# Patient Record
Sex: MALE | Race: WHITE | NOT HISPANIC OR LATINO | Employment: OTHER | URBAN - METROPOLITAN AREA
[De-identification: names, ages, dates, MRNs, and addresses within clinical notes are randomized per-mention and may not be internally consistent; named-entity substitution may affect disease eponyms.]

---

## 2024-03-07 ENCOUNTER — APPOINTMENT (OUTPATIENT)
Dept: CARDIOLOGY | Facility: MEDICAL CENTER | Age: 64
DRG: 232 | End: 2024-03-07
Attending: INTERNAL MEDICINE
Payer: COMMERCIAL

## 2024-03-07 ENCOUNTER — HOSPITAL ENCOUNTER (INPATIENT)
Facility: MEDICAL CENTER | Age: 64
LOS: 9 days | DRG: 232 | End: 2024-03-16
Attending: EMERGENCY MEDICINE | Admitting: INTERNAL MEDICINE
Payer: COMMERCIAL

## 2024-03-07 ENCOUNTER — APPOINTMENT (OUTPATIENT)
Dept: RADIOLOGY | Facility: MEDICAL CENTER | Age: 64
DRG: 232 | End: 2024-03-07
Attending: EMERGENCY MEDICINE
Payer: COMMERCIAL

## 2024-03-07 DIAGNOSIS — R79.89 ELEVATED TROPONIN: ICD-10-CM

## 2024-03-07 DIAGNOSIS — Z95.1 S/P CABG X 4: ICD-10-CM

## 2024-03-07 DIAGNOSIS — Z95.1 S/P CABG X 3: ICD-10-CM

## 2024-03-07 DIAGNOSIS — R07.9 ACUTE CHEST PAIN: ICD-10-CM

## 2024-03-07 PROBLEM — I24.9 ACS (ACUTE CORONARY SYNDROME) (HCC): Status: ACTIVE | Noted: 2024-03-07

## 2024-03-07 PROBLEM — I10 PRIMARY HYPERTENSION: Chronic | Status: ACTIVE | Noted: 2024-03-07

## 2024-03-07 PROBLEM — Z90.81 H/O SPLENECTOMY: Chronic | Status: ACTIVE | Noted: 2024-03-07

## 2024-03-07 PROBLEM — E78.5 HYPERLIPIDEMIA: Chronic | Status: ACTIVE | Noted: 2024-03-07

## 2024-03-07 PROBLEM — E03.9 HYPOTHYROID: Chronic | Status: ACTIVE | Noted: 2024-03-07

## 2024-03-07 PROBLEM — E11.9 TYPE 2 DIABETES MELLITUS, WITHOUT LONG-TERM CURRENT USE OF INSULIN (HCC): Status: ACTIVE | Noted: 2024-03-07

## 2024-03-07 PROBLEM — Z85.71 HISTORY OF HODGKIN'S LYMPHOMA: Chronic | Status: ACTIVE | Noted: 2024-03-07

## 2024-03-07 LAB
ALBUMIN SERPL BCP-MCNC: 4.2 G/DL (ref 3.2–4.9)
ALBUMIN/GLOB SERPL: 1.6 G/DL
ALP SERPL-CCNC: 80 U/L (ref 30–99)
ALT SERPL-CCNC: 34 U/L (ref 2–50)
ANION GAP SERPL CALC-SCNC: 17 MMOL/L (ref 7–16)
APTT PPP: 65.6 SEC (ref 24.7–36)
AST SERPL-CCNC: 40 U/L (ref 12–45)
BASOPHILS # BLD AUTO: 1 % (ref 0–1.8)
BASOPHILS # BLD: 0.1 K/UL (ref 0–0.12)
BILIRUB SERPL-MCNC: 0.4 MG/DL (ref 0.1–1.5)
BUN SERPL-MCNC: 9 MG/DL (ref 8–22)
CALCIUM ALBUM COR SERPL-MCNC: 8.6 MG/DL (ref 8.5–10.5)
CALCIUM SERPL-MCNC: 8.8 MG/DL (ref 8.5–10.5)
CHLORIDE SERPL-SCNC: 101 MMOL/L (ref 96–112)
CO2 SERPL-SCNC: 18 MMOL/L (ref 20–33)
CREAT SERPL-MCNC: 0.69 MG/DL (ref 0.5–1.4)
D DIMER PPP IA.FEU-MCNC: <0.27 UG/ML (FEU) (ref 0–0.5)
EKG IMPRESSION: NORMAL
EOSINOPHIL # BLD AUTO: 0.05 K/UL (ref 0–0.51)
EOSINOPHIL NFR BLD: 0.5 % (ref 0–6.9)
ERYTHROCYTE [DISTWIDTH] IN BLOOD BY AUTOMATED COUNT: 50.5 FL (ref 35.9–50)
EST. AVERAGE GLUCOSE BLD GHB EST-MCNC: 128 MG/DL
GFR SERPLBLD CREATININE-BSD FMLA CKD-EPI: 104 ML/MIN/1.73 M 2
GLOBULIN SER CALC-MCNC: 2.7 G/DL (ref 1.9–3.5)
GLUCOSE BLD STRIP.AUTO-MCNC: 101 MG/DL (ref 65–99)
GLUCOSE BLD STRIP.AUTO-MCNC: 145 MG/DL (ref 65–99)
GLUCOSE SERPL-MCNC: 118 MG/DL (ref 65–99)
HBA1C MFR BLD: 6.1 % (ref 4–5.6)
HCT VFR BLD AUTO: 46.5 % (ref 42–52)
HGB BLD-MCNC: 15.8 G/DL (ref 14–18)
IMM GRANULOCYTES # BLD AUTO: 0.03 K/UL (ref 0–0.11)
IMM GRANULOCYTES NFR BLD AUTO: 0.3 % (ref 0–0.9)
INR PPP: 0.97 (ref 0.87–1.13)
LV EJECT FRACT  99904: 70
LV EJECT FRACT MOD 2C 99903: 75.15
LV EJECT FRACT MOD 4C 99902: 69.46
LV EJECT FRACT MOD BP 99901: 73.04
LYMPHOCYTES # BLD AUTO: 3.09 K/UL (ref 1–4.8)
LYMPHOCYTES NFR BLD: 29.9 % (ref 22–41)
MCH RBC QN AUTO: 34.5 PG (ref 27–33)
MCHC RBC AUTO-ENTMCNC: 34 G/DL (ref 32.3–36.5)
MCV RBC AUTO: 101.5 FL (ref 81.4–97.8)
MONOCYTES # BLD AUTO: 0.49 K/UL (ref 0–0.85)
MONOCYTES NFR BLD AUTO: 4.7 % (ref 0–13.4)
NEUTROPHILS # BLD AUTO: 6.56 K/UL (ref 1.82–7.42)
NEUTROPHILS NFR BLD: 63.6 % (ref 44–72)
NRBC # BLD AUTO: 0 K/UL
NRBC BLD-RTO: 0 /100 WBC (ref 0–0.2)
NT-PROBNP SERPL IA-MCNC: 185 PG/ML (ref 0–125)
PLATELET # BLD AUTO: 311 K/UL (ref 164–446)
PMV BLD AUTO: 10.2 FL (ref 9–12.9)
POTASSIUM SERPL-SCNC: 4.1 MMOL/L (ref 3.6–5.5)
PROT SERPL-MCNC: 6.9 G/DL (ref 6–8.2)
PROTHROMBIN TIME: 13 SEC (ref 12–14.6)
RBC # BLD AUTO: 4.58 M/UL (ref 4.7–6.1)
SODIUM SERPL-SCNC: 136 MMOL/L (ref 135–145)
TROPONIN T SERPL-MCNC: 329 NG/L (ref 6–19)
TROPONIN T SERPL-MCNC: 577 NG/L (ref 6–19)
TROPONIN T SERPL-MCNC: 86 NG/L (ref 6–19)
TSH SERPL DL<=0.005 MIU/L-ACNC: 0.29 UIU/ML (ref 0.38–5.33)
UFH PPP CHRO-ACNC: 0.16 IU/ML
UFH PPP CHRO-ACNC: 0.33 IU/ML
WBC # BLD AUTO: 10.3 K/UL (ref 4.8–10.8)

## 2024-03-07 PROCEDURE — 99223 1ST HOSP IP/OBS HIGH 75: CPT | Performed by: INTERNAL MEDICINE

## 2024-03-07 PROCEDURE — 85610 PROTHROMBIN TIME: CPT

## 2024-03-07 PROCEDURE — 36415 COLL VENOUS BLD VENIPUNCTURE: CPT

## 2024-03-07 PROCEDURE — 93306 TTE W/DOPPLER COMPLETE: CPT | Mod: 26 | Performed by: INTERNAL MEDICINE

## 2024-03-07 PROCEDURE — 84484 ASSAY OF TROPONIN QUANT: CPT

## 2024-03-07 PROCEDURE — 85025 COMPLETE CBC W/AUTO DIFF WBC: CPT

## 2024-03-07 PROCEDURE — 82962 GLUCOSE BLOOD TEST: CPT

## 2024-03-07 PROCEDURE — 85520 HEPARIN ASSAY: CPT

## 2024-03-07 PROCEDURE — A9270 NON-COVERED ITEM OR SERVICE: HCPCS | Performed by: INTERNAL MEDICINE

## 2024-03-07 PROCEDURE — 80053 COMPREHEN METABOLIC PANEL: CPT

## 2024-03-07 PROCEDURE — 99285 EMERGENCY DEPT VISIT HI MDM: CPT

## 2024-03-07 PROCEDURE — 700117 HCHG RX CONTRAST REV CODE 255: Performed by: INTERNAL MEDICINE

## 2024-03-07 PROCEDURE — 85379 FIBRIN DEGRADATION QUANT: CPT

## 2024-03-07 PROCEDURE — 770020 HCHG ROOM/CARE - TELE (206)

## 2024-03-07 PROCEDURE — 96365 THER/PROPH/DIAG IV INF INIT: CPT

## 2024-03-07 PROCEDURE — 83036 HEMOGLOBIN GLYCOSYLATED A1C: CPT

## 2024-03-07 PROCEDURE — 83880 ASSAY OF NATRIURETIC PEPTIDE: CPT

## 2024-03-07 PROCEDURE — 93306 TTE W/DOPPLER COMPLETE: CPT

## 2024-03-07 PROCEDURE — 96366 THER/PROPH/DIAG IV INF ADDON: CPT

## 2024-03-07 PROCEDURE — 93005 ELECTROCARDIOGRAM TRACING: CPT | Performed by: INTERNAL MEDICINE

## 2024-03-07 PROCEDURE — 85730 THROMBOPLASTIN TIME PARTIAL: CPT

## 2024-03-07 PROCEDURE — 93005 ELECTROCARDIOGRAM TRACING: CPT | Performed by: EMERGENCY MEDICINE

## 2024-03-07 PROCEDURE — 700102 HCHG RX REV CODE 250 W/ 637 OVERRIDE(OP): Performed by: INTERNAL MEDICINE

## 2024-03-07 PROCEDURE — 700111 HCHG RX REV CODE 636 W/ 250 OVERRIDE (IP): Performed by: EMERGENCY MEDICINE

## 2024-03-07 PROCEDURE — 71045 X-RAY EXAM CHEST 1 VIEW: CPT

## 2024-03-07 PROCEDURE — 84443 ASSAY THYROID STIM HORMONE: CPT

## 2024-03-07 RX ORDER — MULTIVITAMIN WITH IRON
250 TABLET ORAL EVERY MORNING
COMMUNITY

## 2024-03-07 RX ORDER — LEVOTHYROXINE SODIUM 0.12 MG/1
125 TABLET ORAL EVERY MORNING
COMMUNITY

## 2024-03-07 RX ORDER — PROMETHAZINE HYDROCHLORIDE 25 MG/1
12.5-25 SUPPOSITORY RECTAL EVERY 4 HOURS PRN
Status: DISCONTINUED | OUTPATIENT
Start: 2024-03-07 | End: 2024-03-11

## 2024-03-07 RX ORDER — AMOXICILLIN 250 MG
2 CAPSULE ORAL 2 TIMES DAILY
Status: DISCONTINUED | OUTPATIENT
Start: 2024-03-07 | End: 2024-03-11

## 2024-03-07 RX ORDER — ONDANSETRON 2 MG/ML
4 INJECTION INTRAMUSCULAR; INTRAVENOUS EVERY 4 HOURS PRN
Status: DISCONTINUED | OUTPATIENT
Start: 2024-03-07 | End: 2024-03-11

## 2024-03-07 RX ORDER — PROMETHAZINE HYDROCHLORIDE 25 MG/1
12.5-25 TABLET ORAL EVERY 4 HOURS PRN
Status: DISCONTINUED | OUTPATIENT
Start: 2024-03-07 | End: 2024-03-11

## 2024-03-07 RX ORDER — ONDANSETRON 4 MG/1
4 TABLET, ORALLY DISINTEGRATING ORAL EVERY 4 HOURS PRN
Status: DISCONTINUED | OUTPATIENT
Start: 2024-03-07 | End: 2024-03-11

## 2024-03-07 RX ORDER — ALLOPURINOL 300 MG/1
300 TABLET ORAL DAILY
COMMUNITY

## 2024-03-07 RX ORDER — ATORVASTATIN CALCIUM 80 MG/1
80 TABLET, FILM COATED ORAL EVERY EVENING
Status: DISCONTINUED | OUTPATIENT
Start: 2024-03-07 | End: 2024-03-11

## 2024-03-07 RX ORDER — HEPARIN SODIUM 5000 [USP'U]/100ML
0-30 INJECTION, SOLUTION INTRAVENOUS CONTINUOUS
Status: DISCONTINUED | OUTPATIENT
Start: 2024-03-07 | End: 2024-03-11

## 2024-03-07 RX ORDER — ACETAMINOPHEN 325 MG/1
650 TABLET ORAL EVERY 6 HOURS PRN
Status: DISCONTINUED | OUTPATIENT
Start: 2024-03-07 | End: 2024-03-11

## 2024-03-07 RX ORDER — DEXTROSE MONOHYDRATE 25 G/50ML
25 INJECTION, SOLUTION INTRAVENOUS
Status: DISCONTINUED | OUTPATIENT
Start: 2024-03-07 | End: 2024-03-09

## 2024-03-07 RX ORDER — HEPARIN SODIUM 1000 [USP'U]/ML
2000 INJECTION, SOLUTION INTRAVENOUS; SUBCUTANEOUS PRN
Status: DISCONTINUED | OUTPATIENT
Start: 2024-03-07 | End: 2024-03-11

## 2024-03-07 RX ORDER — NITROGLYCERIN 0.4 MG/1
0.4 TABLET SUBLINGUAL
Status: DISCONTINUED | OUTPATIENT
Start: 2024-03-07 | End: 2024-03-11

## 2024-03-07 RX ORDER — LEVOTHYROXINE SODIUM 0.12 MG/1
125 TABLET ORAL EVERY MORNING
Status: DISCONTINUED | OUTPATIENT
Start: 2024-03-08 | End: 2024-03-16 | Stop reason: HOSPADM

## 2024-03-07 RX ORDER — TAMSULOSIN HYDROCHLORIDE 0.4 MG/1
0.4 CAPSULE ORAL DAILY
Status: DISCONTINUED | OUTPATIENT
Start: 2024-03-07 | End: 2024-03-11

## 2024-03-07 RX ORDER — LISINOPRIL 10 MG/1
10 TABLET ORAL EVERY MORNING
Status: ON HOLD | COMMUNITY
End: 2024-03-16

## 2024-03-07 RX ORDER — ATORVASTATIN CALCIUM 10 MG/1
10 TABLET, FILM COATED ORAL DAILY
Status: ON HOLD | COMMUNITY
End: 2024-03-16

## 2024-03-07 RX ORDER — MULTIVITAMIN
1 TABLET ORAL DAILY
COMMUNITY

## 2024-03-07 RX ORDER — ASPIRIN 81 MG/1
81 TABLET, CHEWABLE ORAL DAILY
Status: DISCONTINUED | OUTPATIENT
Start: 2024-03-07 | End: 2024-03-11

## 2024-03-07 RX ORDER — LACTULOSE 10 G/15ML
10 SOLUTION ORAL
COMMUNITY

## 2024-03-07 RX ORDER — TAMSULOSIN HYDROCHLORIDE 0.4 MG/1
0.4 CAPSULE ORAL DAILY
COMMUNITY

## 2024-03-07 RX ORDER — LISINOPRIL 20 MG/1
20 TABLET ORAL EVERY MORNING
Status: DISCONTINUED | OUTPATIENT
Start: 2024-03-08 | End: 2024-03-09

## 2024-03-07 RX ORDER — PROCHLORPERAZINE EDISYLATE 5 MG/ML
5-10 INJECTION INTRAMUSCULAR; INTRAVENOUS EVERY 4 HOURS PRN
Status: DISCONTINUED | OUTPATIENT
Start: 2024-03-07 | End: 2024-03-11

## 2024-03-07 RX ORDER — POLYETHYLENE GLYCOL 3350 17 G/17G
1 POWDER, FOR SOLUTION ORAL
Status: DISCONTINUED | OUTPATIENT
Start: 2024-03-07 | End: 2024-03-11

## 2024-03-07 RX ADMIN — ATORVASTATIN CALCIUM 80 MG: 80 TABLET, FILM COATED ORAL at 17:17

## 2024-03-07 RX ADMIN — HUMAN ALBUMIN MICROSPHERES AND PERFLUTREN 3 ML: 10; .22 INJECTION, SOLUTION INTRAVENOUS at 14:24

## 2024-03-07 RX ADMIN — HEPARIN SODIUM 12 UNITS/KG/HR: 5000 INJECTION, SOLUTION INTRAVENOUS at 11:24

## 2024-03-07 RX ADMIN — TAMSULOSIN HYDROCHLORIDE 0.4 MG: 0.4 CAPSULE ORAL at 15:43

## 2024-03-07 RX ADMIN — HEPARIN SODIUM 2000 UNITS: 1000 INJECTION, SOLUTION INTRAVENOUS; SUBCUTANEOUS at 19:39

## 2024-03-07 RX ADMIN — ASPIRIN 81 MG 81 MG: 81 TABLET ORAL at 17:38

## 2024-03-07 ASSESSMENT — PAIN DESCRIPTION - PAIN TYPE
TYPE: ACUTE PAIN
TYPE: ACUTE PAIN

## 2024-03-07 ASSESSMENT — COGNITIVE AND FUNCTIONAL STATUS - GENERAL
SUGGESTED CMS G CODE MODIFIER DAILY ACTIVITY: CH
DAILY ACTIVITIY SCORE: 24
MOBILITY SCORE: 24
SUGGESTED CMS G CODE MODIFIER MOBILITY: CH

## 2024-03-07 ASSESSMENT — LIFESTYLE VARIABLES
EVER FELT BAD OR GUILTY ABOUT YOUR DRINKING: NO
ALCOHOL_USE: YES
EVER HAD A DRINK FIRST THING IN THE MORNING TO STEADY YOUR NERVES TO GET RID OF A HANGOVER: NO
TOTAL SCORE: 0
HAVE PEOPLE ANNOYED YOU BY CRITICIZING YOUR DRINKING: NO
DOES PATIENT WANT TO STOP DRINKING: NO
AVERAGE NUMBER OF DAYS PER WEEK YOU HAVE A DRINK CONTAINING ALCOHOL: 5
HAVE YOU EVER FELT YOU SHOULD CUT DOWN ON YOUR DRINKING: NO
ON A TYPICAL DAY WHEN YOU DRINK ALCOHOL HOW MANY DRINKS DO YOU HAVE: 2
TOTAL SCORE: 0
CONSUMPTION TOTAL: NEGATIVE
TOTAL SCORE: 0
HOW MANY TIMES IN THE PAST YEAR HAVE YOU HAD 5 OR MORE DRINKS IN A DAY: 0

## 2024-03-07 ASSESSMENT — ENCOUNTER SYMPTOMS
SHORTNESS OF BREATH: 0
DIZZINESS: 0
VOMITING: 0
FEVER: 0
CONSTIPATION: 0
DIARRHEA: 0
CHILLS: 0
HEADACHES: 0
COUGH: 0
ABDOMINAL PAIN: 0
BACK PAIN: 0
NAUSEA: 0
PALPITATIONS: 0

## 2024-03-07 ASSESSMENT — PATIENT HEALTH QUESTIONNAIRE - PHQ9
1. LITTLE INTEREST OR PLEASURE IN DOING THINGS: NOT AT ALL
SUM OF ALL RESPONSES TO PHQ9 QUESTIONS 1 AND 2: 0
2. FEELING DOWN, DEPRESSED, IRRITABLE, OR HOPELESS: NOT AT ALL

## 2024-03-07 ASSESSMENT — FIBROSIS 4 INDEX: FIB4 SCORE: 1.39

## 2024-03-07 NOTE — ED NOTES
.Bedside report received from off going RN/tech: Johana FROST, assumed care of patient.  POC discussed with patient. Call light within reach, all needs addressed at this time.       Fall risk interventions in place: Move the patient closer to the nurse's station, Patient's personal possessions are with in their safe reach, Place socks on patient, Place fall risk sign on patient's door, Give patient urinal if applicable, and Bed Alarm in use (all applicable per [place] [facility] risk assessment)   Continuous monitoring: Cardiac Leads, Pulse Ox, or Blood Pressure  IVF/IV medications: Infusion per MAR (List Med(s)) heparin  Oxygen:   Bedside sitter: Not Applicable   Isolation: Not Applicable

## 2024-03-07 NOTE — ED NOTES
.Pt. Resting, no changes, 3 p's addressed, call light at bedside, poc updated  Waiting on room assignment

## 2024-03-07 NOTE — ED NOTES
Pharmacy Medication Reconciliation      ~Medication reconciliation updated and complete per patient at bedside with medication list. Reviewed list with patient and returned at bedside   ~Allergies have been verified   ~No oral ABX within the last 30 days  ~Patient home pharmacy :  [facility]       ~Anticoagulants (rivaroxaban, apixaban, edoxaban, dabigatran, warfarin, enoxaparin) taken in the last 14 days? No

## 2024-03-07 NOTE — ED PROVIDER NOTES
"ED Provider Note    CHIEF COMPLAINT  Chief Complaint   Patient presents with    Chest Pain     Pt reports pain to bilateral arms woke his yesterday in the middle of the night that resolved. Today the pain returned and he reported \"pressure\" in the L side of his chest with slight lightheadedness. Denies SOB, N/V.       EXTERNAL RECORDS REVIEWED  Outpatient notes from [facility].  Patient with chest x-ray without widened mediastinum.  Patient's troponin significantly elevated to 276, normal is less than 60    HPI/ROS    OUTSIDE HISTORIAN(S):  I discussed the case directly with transferring physician who reported that x-ray was normal with no mediastinal widening, and the patient had significantly elevated troponin with a normal EKG.    Julian Christine is a 63 y.o. male who presents with chest pain.  Patient reports that he developed chest pain last night, it went away and then he had began this morning.  Patient reports some mild associated shortness of breath at that time.  Chest pain radiated into his neck and his bilateral arms.  He denies any associated radiation to his back.  Patient was seen at [facility] for this complaint, his EKG was negative for any ST elevation or concerning findings otherwise but his troponin was significantly elevated approximately 4 times greater than normal.  Patient received aspirin and was started on a heparin drip.  Patient states his pain is since resolved.  Patient denies any associated tearing quality pain, he denies any associated new weakness or numbness.  Patient denies any pleuritic nature to his pain.  He denies any history of blood clots.    PAST MEDICAL HISTORY       SURGICAL HISTORY  patient denies any surgical history    FAMILY HISTORY  No family history on file.    SOCIAL HISTORY  Social History     Tobacco Use    Smoking status: Not on file    Smokeless tobacco: Not on file   Substance and Sexual Activity    Alcohol use: Not on file    Drug use: Not on " file    Sexual activity: Not on file       CURRENT MEDICATIONS  Home Medications    **Home medications have not yet been reviewed for this encounter**         ALLERGIES  No Known Allergies    PHYSICAL EXAM  VITAL SIGNS: BP (!) 142/81   Pulse 77   Temp 36.1 °C (97 °F) (Temporal)   Resp 20   SpO2 97%    Physical Exam  Constitutional:       Appearance: Normal appearance.   HENT:      Head: Normocephalic.      Right Ear: Tympanic membrane normal.      Left Ear: Tympanic membrane normal.      Nose: Nose normal.      Mouth/Throat:      Mouth: Mucous membranes are moist.   Eyes:      Extraocular Movements: Extraocular movements intact.      Pupils: Pupils are equal, round, and reactive to light.   Cardiovascular:      Rate and Rhythm: Normal rate and regular rhythm.   Pulmonary:      Effort: Pulmonary effort is normal. No respiratory distress.      Breath sounds: Normal breath sounds. No stridor. No wheezing or rales.   Chest:      Chest wall: No tenderness.   Abdominal:      General: Abdomen is flat. There is no distension.      Palpations: Abdomen is soft. There is no mass.      Tenderness: There is no abdominal tenderness.   Musculoskeletal:      Cervical back: Normal range of motion.   Skin:     General: Skin is warm.      Capillary Refill: Capillary refill takes less than 2 seconds.   Neurological:      General: No focal deficit present.      Mental Status: He is alert and oriented to person, place, and time.   Psychiatric:         Mood and Affect: Mood normal.           DIAGNOSTIC STUDIES / PROCEDURES  EKG  I have independently interpreted this EKG   no ST depression, normal axis, normal intervals.    LABS  Results for orders placed or performed during the hospital encounter of 03/07/24   CBC WITH DIFFERENTIAL   Result Value Ref Range    WBC 10.3 4.8 - 10.8 K/uL    RBC 4.58 (L) 4.70 - 6.10 M/uL    Hemoglobin 15.8 14.0 - 18.0 g/dL    Hematocrit 46.5 42.0 - 52.0 %    .5 (H) 81.4 - 97.8 fL    MCH 34.5 (H)  27.0 - 33.0 pg    MCHC 34.0 32.3 - 36.5 g/dL    RDW 50.5 (H) 35.9 - 50.0 fL    Platelet Count 311 164 - 446 K/uL    MPV 10.2 9.0 - 12.9 fL    Neutrophils-Polys 63.60 44.00 - 72.00 %    Lymphocytes 29.90 22.00 - 41.00 %    Monocytes 4.70 0.00 - 13.40 %    Eosinophils 0.50 0.00 - 6.90 %    Basophils 1.00 0.00 - 1.80 %    Immature Granulocytes 0.30 0.00 - 0.90 %    Nucleated RBC 0.00 0.00 - 0.20 /100 WBC    Neutrophils (Absolute) 6.56 1.82 - 7.42 K/uL    Lymphs (Absolute) 3.09 1.00 - 4.80 K/uL    Monos (Absolute) 0.49 0.00 - 0.85 K/uL    Eos (Absolute) 0.05 0.00 - 0.51 K/uL    Baso (Absolute) 0.10 0.00 - 0.12 K/uL    Immature Granulocytes (abs) 0.03 0.00 - 0.11 K/uL    NRBC (Absolute) 0.00 K/uL   CMP   Result Value Ref Range    Sodium 136 135 - 145 mmol/L    Potassium 4.1 3.6 - 5.5 mmol/L    Chloride 101 96 - 112 mmol/L    Co2 18 (L) 20 - 33 mmol/L    Anion Gap 17.0 (H) 7.0 - 16.0    Glucose 118 (H) 65 - 99 mg/dL    Bun 9 8 - 22 mg/dL    Creatinine 0.69 0.50 - 1.40 mg/dL    Calcium 8.8 8.5 - 10.5 mg/dL    Correct Calcium 8.6 8.5 - 10.5 mg/dL    AST(SGOT) 40 12 - 45 U/L    ALT(SGPT) 34 2 - 50 U/L    Alkaline Phosphatase 80 30 - 99 U/L    Total Bilirubin 0.4 0.1 - 1.5 mg/dL    Albumin 4.2 3.2 - 4.9 g/dL    Total Protein 6.9 6.0 - 8.2 g/dL    Globulin 2.7 1.9 - 3.5 g/dL    A-G Ratio 1.6 g/dL   TROPONIN   Result Value Ref Range    Troponin T 86 (H) 6 - 19 ng/L   aPTT   Result Value Ref Range    APTT 65.6 (H) 24.7 - 36.0 sec   Prothrombin Time   Result Value Ref Range    PT 13.0 12.0 - 14.6 sec    INR 0.97 0.87 - 1.13   Heparin Xa (Unfractionated)   Result Value Ref Range    Heparin Xa (UFH) 0.33 IU/mL   ESTIMATED GFR   Result Value Ref Range    GFR (CKD-EPI) 104 >60 mL/min/1.73 m 2   EKG   Result Value Ref Range    Report       [facility] Emergency Dept.    Test Date:  2024-03-07  Pt Name:    BEBO DELACRUZ              Department: ER  MRN:        [id number]                      Room:       BL 18  Gender:      Male                         Technician: [id number]  :        1960                   Requested By:SHALINI MOELLER  Order #:    [id number]                    Reading MD:    Measurements  Intervals                                Axis  Rate:       77                           P:          28  CO:         162                          QRS:        20  QRSD:       85                           T:          41  QT:         377  QTc:        427    Interpretive Statements  Sinus rhythm  Low voltage, precordial leads  Compared to ECG 2012 03:36:40  Low QRS voltage now present  Early repolarization no longer present           RADIOLOGY  I have independently interpreted the diagnostic imaging associated with this visit and am waiting the final reading from the radiologist.   My preliminary interpretation is as follows: Unremarkable chest x-ray  Radiologist interpretation:   DX-CHEST-PORTABLE (1 VIEW)   Final Result      No acute cardiopulmonary abnormality.            COURSE & MEDICAL DECISION MAKING      INITIAL ASSESSMENT, COURSE AND PLAN  Care Narrative: Patient here with known NSTEMI sent from outpatient facility.  His chest pain has resolved.  I am unable to see the x-ray from the outpatient facility and therefore we will repeat this.  Will also check troponin again to see how elevated it is on our assay and to ensure it is not improving.  Patient is already received aspirin.  Will continue heparin.  Patient troponin is moderately elevated.  Patient remains chest pain-free.  EKG here is reassuring.  Chest x-ray very reassuring here.  Basic labs reassuring.  I remain with a low suspicion of dissection or pulmonary embolus because patient's symptoms.  Patient be admitted for further restratification, likely cardiac catheterization.  He is already received aspirin.        DISPOSITION AND DISCUSSIONS  I have discussed management of the patient with the following physicians and MALA's: Hospitalist      Escalation of  care considered, and ultimately not performed: Very low clinical suspicion of dissection or pulm embolus here, therefore CT deferred    FINAL DIAGNOSIS  1. Acute chest pain    2. Elevated troponin

## 2024-03-07 NOTE — ASSESSMENT & PLAN NOTE
S/p cardiac catheterization which revealed multivessel disease.  CTS evaluated and scheduled for CABG on 3/11/2024.    Telemetry monitoring  Continue on heparin drip, monitor for bleeding   Continue on aspirin, Lipitor, lisinopril, metoprolol  Repeat BMP in a.m. to monitor electrolytes and  renal function   Repeat CBC in a.m. to monitor white count and hemoglobin while on iv heparin drip  High risk of deterioration into arrhythmias , need close telemetry monitoring for ACS

## 2024-03-07 NOTE — PROGRESS NOTES
4 Eyes Skin Assessment Completed by Tanika RN and SANTOSH Flores.    Head WDL  Ears Redness and Blanching  Nose WDL  Mouth WDL  Neck WDL  Breast/Chest WDL  Shoulder Blades WDL  Spine WDL  (R) Arm/Elbow/Hand WDL  (L) Arm/Elbow/Hand WDL  Abdomen Scar  Groin WDL  Scrotum/Coccyx/Buttocks WDL  (R) Leg WDL  (L) Leg WDL  (R) Heel/Foot/Toe WDL  (L) Heel/Foot/Toe WDL          Devices In Places ECG, Tele Box, Blood Pressure Cuff, and Pulse Ox      Interventions In Place Pillows    Possible Skin Injury No    Pictures Uploaded Into Epic N/A  Wound Consult Placed N/A  RN Wound Prevention Protocol Ordered No

## 2024-03-07 NOTE — ASSESSMENT & PLAN NOTE
Patient reports having controlled blood pressure at home.  We will continue lisinopril, increasing to 20 mg.

## 2024-03-07 NOTE — H&P
"Hospital Medicine History & Physical Note    Date of Service  3/7/2024    Primary Care Physician  No primary care provider on file.    Consultants  none    Code Status  Full Code    Chief Complaint  Chief Complaint   Patient presents with    Chest Pain     Pt reports pain to bilateral arms woke his yesterday in the middle of the night that resolved. Today the pain returned and he reported \"pressure\" in the L side of his chest with slight lightheadedness. Denies SOB, N/V.       History of Presenting Illness  Julian Christine is a 63 y.o. male who presented 3/7/2024 with chest pressure, transferred from [facility]. Woke up 530AM with chest pressure, lasted 20 minutes, happened day before but less time. Had left arm discomfort, left parasternal pressure, wife stated pt almost passed out.  He has a history ofStarted 15 yo, quit 40 year old. 1-2 packs per day.  /86, does not check BP at home.    I spoke with EDP about patient's case.  Concern for ACS.  Troponin 86.  ECG showed no STEMI.    Outside [facility] records:  Troponin I - 276.    I discussed the plan of care with patient, family, bedside RN, charge RN, , and pharmacy.    Review of Systems  Review of Systems   Constitutional:  Negative for chills, fever and malaise/fatigue.   Respiratory:  Negative for cough and shortness of breath.    Cardiovascular:  Positive for chest pain (pressure). Negative for palpitations.   Gastrointestinal:  Negative for abdominal pain, constipation, diarrhea, nausea and vomiting.   Musculoskeletal:  Negative for back pain and joint pain.   Neurological:  Negative for dizziness and headaches.   All other systems reviewed and are negative.      Past Medical History  Hypertension, T2DM, hyperlipidemia, hypothyroidism, status post radiation for Hodgkin's lymphoma in 1985    Surgical History   has no past surgical history on file.     Family History  family history is not on file.   Family history " reviewed with patient. There is no family history that is pertinent to the chief complaint.     Social History   reports that he has never smoked. He has never used smokeless tobacco. He reports current alcohol use. He reports current drug use. Drug: Inhaled.    Allergies  No Known Allergies    Medications  None       Physical Exam  Temp:  [36.1 °C (97 °F)] 36.1 °C (97 °F)  Pulse:  [77-90] 77  Resp:  [18-35] 35  BP: (142-145)/(81-86) 143/86  SpO2:  [96 %-97 %] 97 %  Blood Pressure: (!) 145/81   Temperature: 36.1 °C (97 °F)   Pulse: 79   Respiration: 20   Pulse Oximetry: 96 %       Physical Exam  Vitals and nursing note reviewed.   Constitutional:       General: He is not in acute distress.     Appearance: He is not diaphoretic.   HENT:      Mouth/Throat:      Mouth: Mucous membranes are dry.      Pharynx: No oropharyngeal exudate.   Cardiovascular:      Comments: Very distant heart sounds, difficult to auscultate  Pulmonary:      Effort: Pulmonary effort is normal. No respiratory distress.      Breath sounds: Normal breath sounds. No wheezing or rales.   Abdominal:      General: Bowel sounds are normal. There is no distension.      Tenderness: There is no abdominal tenderness.   Musculoskeletal:         General: No swelling or tenderness. Normal range of motion.   Skin:     General: Skin is warm.      Capillary Refill: Capillary refill takes less than 2 seconds.      Coloration: Skin is not jaundiced or pale.   Neurological:      General: No focal deficit present.      Mental Status: He is alert and oriented to person, place, and time. Mental status is at baseline.      Motor: No weakness.   Psychiatric:         Mood and Affect: Mood normal.         Behavior: Behavior normal.         Thought Content: Thought content normal.         Judgment: Judgment normal.         Laboratory:  Recent Labs     03/07/24  1012   WBC 10.3   RBC 4.58*   HEMOGLOBIN 15.8   HEMATOCRIT 46.5   .5*   MCH 34.5*   MCHC 34.0   RDW 50.5*  "  PLATELETCT 311   MPV 10.2     Recent Labs     03/07/24  1012   SODIUM 136   POTASSIUM 4.1   CHLORIDE 101   CO2 18*   GLUCOSE 118*   BUN 9   CREATININE 0.69   CALCIUM 8.8     Recent Labs     03/07/24  1012   ALTSGPT 34   ASTSGOT 40   ALKPHOSPHAT 80   TBILIRUBIN 0.4   GLUCOSE 118*     Recent Labs     03/07/24  1012   APTT 65.6*   INR 0.97     No results for input(s): \"NTPROBNP\" in the last 72 hours.      Recent Labs     03/07/24  1012   TROPONINT 86*       Imaging:  DX-CHEST-PORTABLE (1 VIEW)   Final Result      No acute cardiopulmonary abnormality.      EC-ECHOCARDIOGRAM COMPLETE W/O CONT    (Results Pending)       EKG:  My impression is: Heart rate 77, QTc 427 ms, sinus rhythm, no ST depression or elevation    Assessment/Plan:  Justification for Admission Status  I anticipate this patient will require at least two midnights for appropriate medical management, necessitating inpatient admission because patient has acute coronary syndrome.  He has a high heart score at 7.  He has high risk factors.  Symptoms appear to be typical with ongoing elevated troponins.  He will likely need a heart catheterization instead of the stress test.    Patient will need a telemetry bed on medicine service .  The need is secondary to ACS.    * ACS (acute coronary syndrome) (HCC)- (present on admission)  Assessment & Plan  Pt with typical left parasternal chest pressure, starting this morning as he woke up, lasted about 20 minutes, with associated left arm discomfort, the presyncope.  Patient stated this was his second event, had an earlier event yesterday with less symptoms.  Heart score 7  Risk factors include hypertension, hyperlipidemia, T2DM, prior tobacco use, prior lymphoma with chest radiation  Patient was transferred from [facility] for NTEMI  - After further reviewing case, I consulted cardiology awaiting recommendations  - Repeating troponins.  First troponin in ER is 86.  Outside troponin I was 276.  - Admit to " telemetry, inpatient  - Continue heparin drip  - Patient was given aspirin 365 mg by [facility], will continue in the morning.  Increasing patient's home atorvastatin from 10 mg to 80 mg.  - Checking A1c, TSH and lipid panel.    H/O splenectomy- (present on admission)  Assessment & Plan  Hx of    Hypothyroid- (present on admission)  Assessment & Plan  Continue home Synthroid    Type 2 diabetes mellitus, without long-term current use of insulin (HCC)- (present on admission)  Assessment & Plan  Not on insulin at home  Reported A1c 6.3  Will start insulin sliding scale, Accu-Cheks and hypoglycemia protocol  Checking A1c    Hyperlipidemia- (present on admission)  Assessment & Plan  Patient on 10 mg atorvastatin at home.  Increasing to 80 mg.  Checking lipid panel    History of Hodgkin's lymphoma- (present on admission)  Assessment & Plan  Radiation treatment 1985, no chemo    Primary hypertension- (present on admission)  Assessment & Plan  Patient reports having controlled blood pressure at home.  We will continue lisinopril, increasing to 20 mg.        VTE prophylaxis: therapeutic anticoagulation with hep gtt      Total time spent on admission 76 minutes.    This included my review with ER physician, review of ED events, patient's history, outside records, recent records, face to face interview, physical examination; my review of lab results (CBC, chemistry panel), imaging review (CXR) and ECG. Also includes counseling patient on admission, treatment plan, and documentation of encounter.  In addition, speaking with specialist cardiology

## 2024-03-08 ENCOUNTER — APPOINTMENT (OUTPATIENT)
Dept: CARDIOLOGY | Facility: MEDICAL CENTER | Age: 64
DRG: 232 | End: 2024-03-08
Attending: INTERNAL MEDICINE
Payer: COMMERCIAL

## 2024-03-08 LAB
ACT BLD: 233 SEC (ref 74–137)
ANION GAP SERPL CALC-SCNC: 13 MMOL/L (ref 7–16)
BUN SERPL-MCNC: 12 MG/DL (ref 8–22)
CALCIUM SERPL-MCNC: 8.2 MG/DL (ref 8.5–10.5)
CHLORIDE SERPL-SCNC: 105 MMOL/L (ref 96–112)
CHOLEST SERPL-MCNC: 107 MG/DL (ref 100–199)
CO2 SERPL-SCNC: 20 MMOL/L (ref 20–33)
CREAT SERPL-MCNC: 0.68 MG/DL (ref 0.5–1.4)
EKG IMPRESSION: NORMAL
ERYTHROCYTE [DISTWIDTH] IN BLOOD BY AUTOMATED COUNT: 49.1 FL (ref 35.9–50)
GFR SERPLBLD CREATININE-BSD FMLA CKD-EPI: 104 ML/MIN/1.73 M 2
GLUCOSE BLD STRIP.AUTO-MCNC: 109 MG/DL (ref 65–99)
GLUCOSE BLD STRIP.AUTO-MCNC: 109 MG/DL (ref 65–99)
GLUCOSE BLD STRIP.AUTO-MCNC: 116 MG/DL (ref 65–99)
GLUCOSE BLD STRIP.AUTO-MCNC: 125 MG/DL (ref 65–99)
GLUCOSE BLD STRIP.AUTO-MCNC: 98 MG/DL (ref 65–99)
GLUCOSE SERPL-MCNC: 116 MG/DL (ref 65–99)
HCT VFR BLD AUTO: 39.7 % (ref 42–52)
HDLC SERPL-MCNC: 57 MG/DL
HGB BLD-MCNC: 14.2 G/DL (ref 14–18)
LDLC SERPL CALC-MCNC: 36 MG/DL
MAGNESIUM SERPL-MCNC: 1.8 MG/DL (ref 1.5–2.5)
MCH RBC QN AUTO: 35.2 PG (ref 27–33)
MCHC RBC AUTO-ENTMCNC: 35.8 G/DL (ref 32.3–36.5)
MCV RBC AUTO: 98.5 FL (ref 81.4–97.8)
PLATELET # BLD AUTO: 266 K/UL (ref 164–446)
PMV BLD AUTO: 10.4 FL (ref 9–12.9)
POTASSIUM SERPL-SCNC: 3.9 MMOL/L (ref 3.6–5.5)
RBC # BLD AUTO: 4.03 M/UL (ref 4.7–6.1)
SODIUM SERPL-SCNC: 138 MMOL/L (ref 135–145)
TRIGL SERPL-MCNC: 72 MG/DL (ref 0–149)
TROPONIN T SERPL-MCNC: 689 NG/L (ref 6–19)
TROPONIN T SERPL-MCNC: 744 NG/L (ref 6–19)
UFH PPP CHRO-ACNC: 0.37 IU/ML
UFH PPP CHRO-ACNC: 0.47 IU/ML
UFH PPP CHRO-ACNC: 0.52 IU/ML
WBC # BLD AUTO: 11.6 K/UL (ref 4.8–10.8)

## 2024-03-08 PROCEDURE — 02703ZZ DILATION OF CORONARY ARTERY, ONE ARTERY, PERCUTANEOUS APPROACH: ICD-10-PCS | Performed by: INTERNAL MEDICINE

## 2024-03-08 PROCEDURE — A9270 NON-COVERED ITEM OR SERVICE: HCPCS | Performed by: INTERNAL MEDICINE

## 2024-03-08 PROCEDURE — 4A023N7 MEASUREMENT OF CARDIAC SAMPLING AND PRESSURE, LEFT HEART, PERCUTANEOUS APPROACH: ICD-10-PCS | Performed by: INTERNAL MEDICINE

## 2024-03-08 PROCEDURE — 36415 COLL VENOUS BLD VENIPUNCTURE: CPT

## 2024-03-08 PROCEDURE — 99232 SBSQ HOSP IP/OBS MODERATE 35: CPT | Mod: 25,FS | Performed by: INTERNAL MEDICINE

## 2024-03-08 PROCEDURE — 82962 GLUCOSE BLOOD TEST: CPT

## 2024-03-08 PROCEDURE — 700102 HCHG RX REV CODE 250 W/ 637 OVERRIDE(OP): Performed by: NURSE PRACTITIONER

## 2024-03-08 PROCEDURE — 83735 ASSAY OF MAGNESIUM: CPT

## 2024-03-08 PROCEDURE — 85027 COMPLETE CBC AUTOMATED: CPT

## 2024-03-08 PROCEDURE — 80061 LIPID PANEL: CPT

## 2024-03-08 PROCEDURE — B2111ZZ FLUOROSCOPY OF MULTIPLE CORONARY ARTERIES USING LOW OSMOLAR CONTRAST: ICD-10-PCS | Performed by: INTERNAL MEDICINE

## 2024-03-08 PROCEDURE — 84484 ASSAY OF TROPONIN QUANT: CPT

## 2024-03-08 PROCEDURE — 700102 HCHG RX REV CODE 250 W/ 637 OVERRIDE(OP): Performed by: INTERNAL MEDICINE

## 2024-03-08 PROCEDURE — 700111 HCHG RX REV CODE 636 W/ 250 OVERRIDE (IP): Performed by: EMERGENCY MEDICINE

## 2024-03-08 PROCEDURE — 80048 BASIC METABOLIC PNL TOTAL CA: CPT

## 2024-03-08 PROCEDURE — 93010 ELECTROCARDIOGRAM REPORT: CPT | Performed by: INTERNAL MEDICINE

## 2024-03-08 PROCEDURE — 700111 HCHG RX REV CODE 636 W/ 250 OVERRIDE (IP)

## 2024-03-08 PROCEDURE — 700101 HCHG RX REV CODE 250

## 2024-03-08 PROCEDURE — B240ZZ3 ULTRASONOGRAPHY OF SINGLE CORONARY ARTERY, INTRAVASCULAR: ICD-10-PCS | Performed by: INTERNAL MEDICINE

## 2024-03-08 PROCEDURE — 99233 SBSQ HOSP IP/OBS HIGH 50: CPT | Performed by: STUDENT IN AN ORGANIZED HEALTH CARE EDUCATION/TRAINING PROGRAM

## 2024-03-08 PROCEDURE — 99152 MOD SED SAME PHYS/QHP 5/>YRS: CPT | Performed by: INTERNAL MEDICINE

## 2024-03-08 PROCEDURE — 93458 L HRT ARTERY/VENTRICLE ANGIO: CPT | Mod: 26,59 | Performed by: INTERNAL MEDICINE

## 2024-03-08 PROCEDURE — 99153 MOD SED SAME PHYS/QHP EA: CPT

## 2024-03-08 PROCEDURE — 770020 HCHG ROOM/CARE - TELE (206)

## 2024-03-08 PROCEDURE — 92978 ENDOLUMINL IVUS OCT C 1ST: CPT | Mod: 26,RC | Performed by: INTERNAL MEDICINE

## 2024-03-08 PROCEDURE — 700117 HCHG RX CONTRAST REV CODE 255: Performed by: INTERNAL MEDICINE

## 2024-03-08 PROCEDURE — 85520 HEPARIN ASSAY: CPT | Mod: 91

## 2024-03-08 PROCEDURE — A9270 NON-COVERED ITEM OR SERVICE: HCPCS | Performed by: NURSE PRACTITIONER

## 2024-03-08 PROCEDURE — 92920 PRQ TRLUML C ANGIOP 1ART&/BR: CPT | Mod: RC

## 2024-03-08 PROCEDURE — 85347 COAGULATION TIME ACTIVATED: CPT

## 2024-03-08 PROCEDURE — 92920 PRQ TRLUML C ANGIOP 1ART&/BR: CPT | Mod: RC | Performed by: INTERNAL MEDICINE

## 2024-03-08 RX ORDER — HEPARIN SODIUM 200 [USP'U]/100ML
INJECTION, SOLUTION INTRAVENOUS
Status: COMPLETED
Start: 2024-03-08 | End: 2024-03-08

## 2024-03-08 RX ORDER — HEPARIN SODIUM 1000 [USP'U]/ML
INJECTION, SOLUTION INTRAVENOUS; SUBCUTANEOUS
Status: COMPLETED
Start: 2024-03-08 | End: 2024-03-08

## 2024-03-08 RX ORDER — VERAPAMIL HYDROCHLORIDE 2.5 MG/ML
INJECTION, SOLUTION INTRAVENOUS
Status: COMPLETED
Start: 2024-03-08 | End: 2024-03-08

## 2024-03-08 RX ORDER — MIDAZOLAM HYDROCHLORIDE 1 MG/ML
INJECTION INTRAMUSCULAR; INTRAVENOUS
Status: COMPLETED
Start: 2024-03-08 | End: 2024-03-08

## 2024-03-08 RX ORDER — LIDOCAINE HYDROCHLORIDE 20 MG/ML
INJECTION, SOLUTION INFILTRATION; PERINEURAL
Status: COMPLETED
Start: 2024-03-08 | End: 2024-03-08

## 2024-03-08 RX ADMIN — METOPROLOL TARTRATE 25 MG: 25 TABLET, FILM COATED ORAL at 11:38

## 2024-03-08 RX ADMIN — HEPARIN SODIUM: 1000 INJECTION, SOLUTION INTRAVENOUS; SUBCUTANEOUS at 15:38

## 2024-03-08 RX ADMIN — NITROGLYCERIN 10 ML: 20 INJECTION INTRAVENOUS at 15:37

## 2024-03-08 RX ADMIN — VERAPAMIL HYDROCHLORIDE 2.5 MG: 2.5 INJECTION, SOLUTION INTRAVENOUS at 15:37

## 2024-03-08 RX ADMIN — MIDAZOLAM HYDROCHLORIDE 0.5 MG: 2 INJECTION, SOLUTION INTRAMUSCULAR; INTRAVENOUS at 16:24

## 2024-03-08 RX ADMIN — ATORVASTATIN CALCIUM 80 MG: 80 TABLET, FILM COATED ORAL at 17:09

## 2024-03-08 RX ADMIN — MIDAZOLAM HYDROCHLORIDE 2 MG: 2 INJECTION, SOLUTION INTRAMUSCULAR; INTRAVENOUS at 16:13

## 2024-03-08 RX ADMIN — TAMSULOSIN HYDROCHLORIDE 0.4 MG: 0.4 CAPSULE ORAL at 05:23

## 2024-03-08 RX ADMIN — LEVOTHYROXINE SODIUM 125 MCG: 0.12 TABLET ORAL at 05:23

## 2024-03-08 RX ADMIN — METOPROLOL TARTRATE 25 MG: 25 TABLET, FILM COATED ORAL at 17:09

## 2024-03-08 RX ADMIN — LIDOCAINE HYDROCHLORIDE: 20 INJECTION, SOLUTION INFILTRATION; PERINEURAL at 15:37

## 2024-03-08 RX ADMIN — FENTANYL CITRATE 100 MCG: 50 INJECTION, SOLUTION INTRAMUSCULAR; INTRAVENOUS at 16:14

## 2024-03-08 RX ADMIN — LISINOPRIL 20 MG: 20 TABLET ORAL at 05:23

## 2024-03-08 RX ADMIN — HEPARIN SODIUM 14 UNITS/KG/HR: 5000 INJECTION, SOLUTION INTRAVENOUS at 11:40

## 2024-03-08 RX ADMIN — ASPIRIN 81 MG 81 MG: 81 TABLET ORAL at 05:23

## 2024-03-08 RX ADMIN — IOHEXOL 55 ML: 350 INJECTION, SOLUTION INTRAVENOUS at 16:25

## 2024-03-08 RX ADMIN — HEPARIN SODIUM 2000 UNITS: 200 INJECTION, SOLUTION INTRAVENOUS at 15:38

## 2024-03-08 ASSESSMENT — ENCOUNTER SYMPTOMS
CHILLS: 0
FEVER: 0
ABDOMINAL DISTENTION: 0
ABDOMINAL PAIN: 0
NUMBNESS: 0
AGITATION: 0
NERVOUS/ANXIOUS: 0
DIAPHORESIS: 0
COLOR CHANGE: 0
BLOOD IN STOOL: 0
CONFUSION: 0
DIZZINESS: 0
COUGH: 0
CHEST TIGHTNESS: 0
SHORTNESS OF BREATH: 0
PALPITATIONS: 0
TROUBLE SWALLOWING: 0

## 2024-03-08 ASSESSMENT — PAIN DESCRIPTION - PAIN TYPE: TYPE: ACUTE PAIN

## 2024-03-08 ASSESSMENT — FIBROSIS 4 INDEX: FIB4 SCORE: 1.62

## 2024-03-08 NOTE — CARE PLAN
The patient is Watcher - Medium risk of patient condition declining or worsening    Shift Goals  Clinical Goals: Hepain gtt, monitor for chest pain  Patient Goals: Eat, mobility    Progress made toward(s) clinical / shift goals:    Problem: Knowledge Deficit - Standard  Goal: Patient and family/care givers will demonstrate understanding of plan of care, disease process/condition, diagnostic tests and medications  Outcome: Progressing  Note: POC discussed with patient and spouse at bedside. All questions answered at this time.     Problem: Pain - Standard  Goal: Alleviation of pain or a reduction in pain to the patient’s comfort goal  Outcome: Progressing  Note: Patient denies any pain.       Patient is not progressing towards the following goals:

## 2024-03-08 NOTE — CONSULTS
"Reason for Consult:  Asked by Dr Adrian Campbell M.D. to see this patient with acute coronary syndrome  Patient's PCP: Pcp Pt States None    CC:   Chief Complaint   Patient presents with    Chest Pain     Pt reports pain to bilateral arms woke his yesterday in the middle of the night that resolved. Today the pain returned and he reported \"pressure\" in the L side of his chest with slight lightheadedness. Denies SOB, N/V.       HPI: This is a 63-year-old gentleman with history of diabetes dyslipidemia follows regularly with primary care Caitie who presents with chest pains which she had yesterday is planning to go to the lab for his labs prior to upcoming PMD appointment with them and chest pains the emergency room Caitie found to have positive troponin but stable EKG he was given aspirin and heparin his chest pains have resolved.  No recent stress no recent illness he did not notice any other associated symptoms perhaps mild dyspnea, no early coronary disease he was a remote smoker    Medications / Drug list prior to admission:  No current facility-administered medications on file prior to encounter.     Current Outpatient Medications on File Prior to Encounter   Medication Sig Dispense Refill    Alpha-Lipoic Acid 300 MG Tab Take 300 mg by mouth every day. IN THE AFTERNOON      CYANOCOBALAMIN PO Take 1 Tablet by mouth every morning.      Magnesium 250 MG Tab Take 250 mg by mouth every morning.      Multiple Vitamin (ONE-DAILY MULTIVITAMINS) Tab Take 1 Tablet by mouth every day. IN THE AFTERNOON      metFORMIN (GLUCOPHAGE) 500 MG Tab Take 500 mg by mouth every morning.      lisinopril (PRINIVIL) 10 MG Tab Take 10 mg by mouth every morning.      allopurinol (ZYLOPRIM) 300 MG Tab Take 300 mg by mouth every day. IN THE AFTERNOON      atorvastatin (LIPITOR) 10 MG Tab Take 10 mg by mouth every day. IN THE AFTERNOON      tamsulosin (FLOMAX) 0.4 MG capsule Take 0.4 mg by mouth every day. IN THE AFTERNOON      " levothyroxine (SYNTHROID) 125 MCG Tab Take 125 mcg by mouth every morning.      lactulose (CONSTULOSE) 10 GM/15ML Solution Take 10 g by mouth at bedtime.         Current list of administered Medications:    Current Facility-Administered Medications:     heparin infusion 25,000 units in 500 mL 0.45% NACL, 0-30 Units/kg/hr, Intravenous, Continuous, Ady Ramirez M.D., Last Rate: 18.5 mL/hr at 03/07/24 1535, 12 Units/kg/hr at 03/07/24 1535    heparin injection 2,000 Units, 2,000 Units, Intravenous, PRN, Ady Ramirez M.D.    nitroglycerin (Nitrostat) tablet 0.4 mg, 0.4 mg, Sublingual, Q5 MIN PRN, Adrian Campbell M.D.    tamsulosin (Flomax) capsule 0.4 mg, 0.4 mg, Oral, DAILY, Adrian Campbell M.D., 0.4 mg at 03/07/24 1543    atorvastatin (Lipitor) tablet 80 mg, 80 mg, Oral, Q EVENING, Adrian Campbell M.D.    [START ON 3/8/2024] levothyroxine (Synthroid) tablet 125 mcg, 125 mcg, Oral, QAM, Adrian Campbell M.D.    [START ON 3/8/2024] lisinopril (Prinivil) tablet 20 mg, 20 mg, Oral, QAM, Adrian Campbell M.D.    acetaminophen (Tylenol) tablet 650 mg, 650 mg, Oral, Q6HRS PRN, Adrian Campbell M.D.    senna-docusate (Pericolace Or Senokot S) 8.6-50 MG per tablet 2 Tablet, 2 Tablet, Oral, BID **AND** polyethylene glycol/lytes (Miralax) Packet 1 Packet, 1 Packet, Oral, QDAY PRN, Adrian Campbell M.D.    ondansetron (Zofran) syringe/vial injection 4 mg, 4 mg, Intravenous, Q4HRS PRN, Adrian Campbell M.D.    ondansetron (Zofran ODT) dispertab 4 mg, 4 mg, Oral, Q4HRS PRN, Adrian Campbell M.D.    promethazine (Phenergan) tablet 12.5-25 mg, 12.5-25 mg, Oral, Q4HRS PRN, Adrian Campbell M.D.    promethazine (Phenergan) suppository 12.5-25 mg, 12.5-25 mg, Rectal, Q4HRS PRN, Adrian Campbell M.D.    prochlorperazine (Compazine) injection 5-10 mg, 5-10 mg, Intravenous, Q4HRS PRN, Adrian Campbell M.D.    insulin regular (HumuLIN R,NovoLIN R) injection, 1-6 Units, Subcutaneous, Q6HRS  "**AND** POC blood glucose manual result, , , Q6H **AND** NOTIFY MD and PharmD, , , Once **AND** Administer 20 grams of glucose (approximately 8 ounces of fruit juice) every 15 minutes PRN FSBG less than 70 mg/dL, , , PRN **AND** dextrose 50% (D50W) injection 25 g, 25 g, Intravenous, Q15 MIN PRN, Adrian Campbell M.D.  Past medical history  Diabetes and dyslipidemia  History of Hodgkin's lymphoma status postradiation in the 80s  Hypothyroidism  Hypertension    History reviewed. No pertinent surgical history.    His maternal grand father had coronary disease  Patient family history was personally reviewed, no pertinent family history to current presentation    Social History     Tobacco Use    Smoking status: Never    Smokeless tobacco: Never   Vaping Use    Vaping Use: Never used   Substance Use Topics    Alcohol use: Yes     Comment: 2-3 scotch drinks nightly    Drug use: Yes     Types: Inhaled     Comment: marijuana       ALLERGIES:  No Known Allergies    Review of systems:  A complete review of symptoms was reviewed with patient. This is reviewed in H&P and PMH. ALL OTHERS reviewed and negative    Physical exam:  Patient Vitals for the past 24 hrs:   BP Temp Temp src Pulse Resp SpO2 Height Weight   03/07/24 1527 (!) 147/80 36.8 °C (98.2 °F) Temporal 92 17 97 % -- 77.4 kg (170 lb 10.2 oz)   03/07/24 1302 (!) 152/84 -- -- 74 (!) 77 96 % -- --   03/07/24 1202 (!) 143/86 -- -- 77 (!) 35 97 % -- --   03/07/24 1130 -- -- -- 79 20 96 % -- --   03/07/24 1100 (!) 145/81 -- -- 90 18 97 % -- --   03/07/24 1021 -- -- -- -- -- -- 1.778 m (5' 10\") 77.1 kg (170 lb)   03/07/24 1016 -- 36.1 °C (97 °F) Temporal -- -- -- -- --   03/07/24 1007 (!) 142/81 -- -- 77 20 97 % -- --     General: No acute distress.   EYES: no jaundice  HEENT: OP clear   Neck:  No JVD.   CVS:  [ RRR.   Resp: Normal respiratory effort,   Abdomen: ND,  Skin: Grossly nothing acute no obvious rashes  Neurological: Alert, Moves all extremities  Extremities:   " [  no edema. No cyanosis.       Data:  Laboratory studies personally reviewed by me:  Recent Results (from the past 24 hour(s))   CBC WITH DIFFERENTIAL    Collection Time: 03/07/24 10:12 AM   Result Value Ref Range    WBC 10.3 4.8 - 10.8 K/uL    RBC 4.58 (L) 4.70 - 6.10 M/uL    Hemoglobin 15.8 14.0 - 18.0 g/dL    Hematocrit 46.5 42.0 - 52.0 %    .5 (H) 81.4 - 97.8 fL    MCH 34.5 (H) 27.0 - 33.0 pg    MCHC 34.0 32.3 - 36.5 g/dL    RDW 50.5 (H) 35.9 - 50.0 fL    Platelet Count 311 164 - 446 K/uL    MPV 10.2 9.0 - 12.9 fL    Neutrophils-Polys 63.60 44.00 - 72.00 %    Lymphocytes 29.90 22.00 - 41.00 %    Monocytes 4.70 0.00 - 13.40 %    Eosinophils 0.50 0.00 - 6.90 %    Basophils 1.00 0.00 - 1.80 %    Immature Granulocytes 0.30 0.00 - 0.90 %    Nucleated RBC 0.00 0.00 - 0.20 /100 WBC    Neutrophils (Absolute) 6.56 1.82 - 7.42 K/uL    Lymphs (Absolute) 3.09 1.00 - 4.80 K/uL    Monos (Absolute) 0.49 0.00 - 0.85 K/uL    Eos (Absolute) 0.05 0.00 - 0.51 K/uL    Baso (Absolute) 0.10 0.00 - 0.12 K/uL    Immature Granulocytes (abs) 0.03 0.00 - 0.11 K/uL    NRBC (Absolute) 0.00 K/uL   CMP    Collection Time: 03/07/24 10:12 AM   Result Value Ref Range    Sodium 136 135 - 145 mmol/L    Potassium 4.1 3.6 - 5.5 mmol/L    Chloride 101 96 - 112 mmol/L    Co2 18 (L) 20 - 33 mmol/L    Anion Gap 17.0 (H) 7.0 - 16.0    Glucose 118 (H) 65 - 99 mg/dL    Bun 9 8 - 22 mg/dL    Creatinine 0.69 0.50 - 1.40 mg/dL    Calcium 8.8 8.5 - 10.5 mg/dL    Correct Calcium 8.6 8.5 - 10.5 mg/dL    AST(SGOT) 40 12 - 45 U/L    ALT(SGPT) 34 2 - 50 U/L    Alkaline Phosphatase 80 30 - 99 U/L    Total Bilirubin 0.4 0.1 - 1.5 mg/dL    Albumin 4.2 3.2 - 4.9 g/dL    Total Protein 6.9 6.0 - 8.2 g/dL    Globulin 2.7 1.9 - 3.5 g/dL    A-G Ratio 1.6 g/dL   TROPONIN    Collection Time: 03/07/24 10:12 AM   Result Value Ref Range    Troponin T 86 (H) 6 - 19 ng/L   aPTT    Collection Time: 03/07/24 10:12 AM   Result Value Ref Range    APTT 65.6 (H) 24.7 - 36.0 sec    Prothrombin Time    Collection Time: 24 10:12 AM   Result Value Ref Range    PT 13.0 12.0 - 14.6 sec    INR 0.97 0.87 - 1.13   Heparin Xa (Unfractionated)    Collection Time: 24 10:12 AM   Result Value Ref Range    Heparin Xa (UFH) 0.33 IU/mL   ESTIMATED GFR    Collection Time: 24 10:12 AM   Result Value Ref Range    GFR (CKD-EPI) 104 >60 mL/min/1.73 m 2   D-Dimer    Collection Time: 24 10:12 AM   Result Value Ref Range    D-Dimer <0.27 0.00 - 0.50 ug/mL (FEU)   proBrain Natriuretic Peptide, NT    Collection Time: 24 10:12 AM   Result Value Ref Range    NT-proBNP 185 (H) 0 - 125 pg/mL   TSH    Collection Time: 24 10:12 AM   Result Value Ref Range    TSH 0.290 (L) 0.380 - 5.330 uIU/mL   EKG    Collection Time: 24 10:23 AM   Result Value Ref Range    Report       [facility]    Test Date:  2024  Pt Name:    BEBO DELACRUZ              Department: ER  MRN:        [id number]                      Room:        18  Gender:     Male                         Technician: [id number]  :        1960                   Requested By:SHALINI MOELLER  Order #:    [id number]                    Reading MD: James Garsia MD    Measurements  Intervals                                Axis  Rate:       77                           P:          28  AR:         162                          QRS:        20  QRSD:       85                           T:          41  QT:         377  QTc:        427    Interpretive Statements  EKG is nsr, no sascha,  no ST depression, normal axis, normal intervals.  Electronically Signed On 2024 11:52:18 PST by James Garsia MD     Troponin - STAT Once    Collection Time: 24  3:07 PM   Result Value Ref Range    Troponin T 329 (H) 6 - 19 ng/L   POCT glucose device results    Collection Time: 24  3:45 PM   Result Value Ref Range    POC Glucose, Blood 101 (H) 65 - 99 mg/dL       Imaging:  EC-ECHOCARDIOGRAM COMPLETE  W/ CONT         DX-CHEST-PORTABLE (1 VIEW)   Final Result      No acute cardiopulmonary abnormality.              EKG tracings personally reviewed by me          All pertinent features of laboratory and imaging reviewed including primary images where applicable      Principal Problem:    ACS (acute coronary syndrome) (HCC) (POA: Yes)  Active Problems:    Primary hypertension (Chronic) (POA: Yes)    History of Hodgkin's lymphoma (Chronic) (POA: Yes)    Hyperlipidemia (Chronic) (POA: Yes)    Type 2 diabetes mellitus, without long-term current use of insulin (HCC) (POA: Yes)    Hypothyroid (Chronic) (POA: Yes)    H/O splenectomy (Chronic) (POA: Yes)  Resolved Problems:    * No resolved hospital problems. *      Assessment / Plan:  ACS   Heparin  Statin  Dapt after angiogram  Check echo  Former smoker    We discussed the merits of coronary angiogram with possible coronary intervention he agrees to proceed we will do this tomorrow unless he has recurrent refractory chest pain    I personally discussed his case with  Dr Adrian Campbell M.D.        It is my pleasure to participate in the care of Mr. Christine.  Please do not hesitate to contact me with questions or concerns.    Tyler Sal MD PhD [facility]  Cardiologist [facility]    3/7/2024    Please note that this dictation was created using voice recognition software. There may be errors I did not discover before finalizing the note.

## 2024-03-08 NOTE — PROGRESS NOTES
Bedside report received. Patient A/Ox 4. VS -160's. No oxygen requirements at this time. Telemetry monitoring SR. No complaints of pain currently. POC discussed with patient. Pt verbalizes understanding. Call light and belongings within reach. Bed locked and lowest position and fall precautions in place.    Bedside report received from off going RN/tech: SANTOSH Mart assumed care of patient.     Fall Risk Score: LOW RISK  Fall risk interventions in place: Place yellow fall risk ID band on patient, Provide patient/family education based on risk assessment, Educate patient/family to call staff for assistance when getting out of bed, Place fall precaution signage outside patient door, and Place patient in room close to nursing station  Bed type: Regular (Rene Score less than 17 interventions in place)  Patient on cardiac monitor: Yes  IVF/IV medications: Infusion per MAR (List Med(s)) Heparin at 18 units/kg/hr  Oxygen: Room Air  Bedside sitter: Not Applicable   Isolation: Not applicable

## 2024-03-08 NOTE — PROGRESS NOTES
Hospital Medicine Daily Progress Note    Date of Service  3/8/2024    Chief Complaint  Julian Christine is a 63 y.o. male admitted 3/7/2024 with NSTEMI    Hospital Course  60 male with past medical history of hyperlipidemia, type 2 diabetes mellitus, hypertension referred from outside facility for NSTEMI.  Noted to have mildly elevated troponin.  Initiated on heparin drip for ACS.  Cardiology evaluated and is scheduled for cholangiogram.    Interval Problem Update    3/8/2024  Seen and examined at bedside in morning rounds.  Spouse at bedside  Remain asymptomatic  Vitals remained stable    Labs reviewed noted leukocytosis, white count 11.6, hemoglobin 14.2, chemistry noted sodium 138, renal function stable, troponin trending flat in high 600.  Chest x-ray reviewed noted with no acute cardiopulmonary normality  Echocardiogram noted with EF 70%, mild mitral regurgitation        Telemetry monitoring  Continue on heparin drip, monitor for bleeding   Continue on aspirin, Lipitor, lisinopril, metoprolol  Scheduled for angiogram today  Case discussed with cardiologist Dr. Sal  Repeat BMP in a.m. to monitor electrolytes and  renal function   Repeat CBC in a.m. to monitor white count and hemoglobin while on iv heparin drip  Scheduled for coronary angiogram   High risk of deterioration into arrhythmias , need close telemetry monitoring for ACS        I have discussed this patient's plan of care and discharge plan at IDT rounds today with Case Management, Nursing, Nursing leadership, and other members of the IDT team.    Consultants/Specialty  cardiology    Code Status  Full Code    Disposition    Anticipate discharge to: home with close outpatient follow-up    I have placed the appropriate orders for post-discharge needs.    Review of Systems  Review of Systems   Respiratory:  Negative for shortness of breath.    Cardiovascular:  Negative for chest pain.        Physical Exam  Temp:  [36.2 °C (97.2 °F)-37.4 °C (99.4 °F)] 37  °C (98.6 °F)  Pulse:  [65-92] 65  Resp:  [16-77] 18  BP: (117-152)/(71-91) 117/71  SpO2:  [90 %-97 %] 92 %    Physical Exam  Constitutional:       Appearance: Normal appearance.   Cardiovascular:      Rate and Rhythm: Normal rate and regular rhythm.      Pulses: Normal pulses.      Heart sounds: Normal heart sounds.   Pulmonary:      Effort: Pulmonary effort is normal. No respiratory distress.      Breath sounds: Normal breath sounds.   Musculoskeletal:      Right lower leg: No edema.      Left lower leg: No edema.   Skin:     General: Skin is warm.      Capillary Refill: Capillary refill takes less than 2 seconds.   Neurological:      General: No focal deficit present.      Mental Status: He is alert and oriented to person, place, and time.   Psychiatric:         Mood and Affect: Mood normal.         Fluids    Intake/Output Summary (Last 24 hours) at 3/8/2024 1212  Last data filed at 3/8/2024 0527  Gross per 24 hour   Intake 120 ml   Output 1101 ml   Net -981 ml       Laboratory  Recent Labs     03/07/24  1012 03/08/24  0356   WBC 10.3 11.6*   RBC 4.58* 4.03*   HEMOGLOBIN 15.8 14.2   HEMATOCRIT 46.5 39.7*   .5* 98.5*   MCH 34.5* 35.2*   MCHC 34.0 35.8   RDW 50.5* 49.1   PLATELETCT 311 266   MPV 10.2 10.4     Recent Labs     03/07/24  1012 03/08/24  0356   SODIUM 136 138   POTASSIUM 4.1 3.9   CHLORIDE 101 105   CO2 18* 20   GLUCOSE 118* 116*   BUN 9 12   CREATININE 0.69 0.68   CALCIUM 8.8 8.2*     Recent Labs     03/07/24  1012   APTT 65.6*   INR 0.97         Recent Labs     03/08/24  0356   TRIGLYCERIDE 72   HDL 57   LDL 36       Imaging  EC-ECHOCARDIOGRAM COMPLETE W/ CONT   Final Result      DX-CHEST-PORTABLE (1 VIEW)   Final Result      No acute cardiopulmonary abnormality.      CL-LEFT HEART CATHETERIZATION WITH POSSIBLE INTERVENTION    (Results Pending)        Assessment/Plan  * ACS (acute coronary syndrome) (HCC)- (present on admission)  Assessment & Plan  Pt with typical left parasternal chest  pressure, starting this morning as he woke up, lasted about 20 minutes, with associated left arm discomfort, the presyncope.  Patient stated this was his second event, had an earlier event yesterday with less symptoms.  Heart score 7  Risk factors include hypertension, hyperlipidemia, T2DM, prior tobacco use, prior lymphoma with chest radiation  Patient was transferred from [facility] NTEMI      Telemetry monitoring  Continue on heparin drip, monitor for bleeding   Continue on aspirin, Lipitor, lisinopril, metoprolol  Scheduled for angiogram today  Case discussed with cardiologist Dr. Sal  Repeat BMP in a.m. to monitor electrolytes and  renal function   Repeat CBC in a.m. to monitor white count and hemoglobin while on iv heparin drip  Scheduled for coronary angiogram   High risk of deterioration into arrhythmias , need close telemetry monitoring for ACS      H/O splenectomy- (present on admission)  Assessment & Plan  Hx of    Hypothyroid- (present on admission)  Assessment & Plan  Continue home Synthroid    Type 2 diabetes mellitus, without long-term current use of insulin (HCC)- (present on admission)  Assessment & Plan  Not on insulin at home  Reported A1c 6.3  Will start insulin sliding scale, Accu-Cheks and hypoglycemia protocol  Checking A1c    Hyperlipidemia- (present on admission)  Assessment & Plan  Patient on 10 mg atorvastatin at home.  Increasing to 80 mg.  Checking lipid panel    History of Hodgkin's lymphoma- (present on admission)  Assessment & Plan  Radiation treatment 1985, no chemo    Primary hypertension- (present on admission)  Assessment & Plan  Patient reports having controlled blood pressure at home.  We will continue lisinopril, increasing to 20 mg.         VTE prophylaxis: IV heparin drip     I have performed a physical exam and reviewed and updated ROS and Plan today (3/8/2024). In review of yesterday's note (3/7/2024), there are no changes except as documented above.

## 2024-03-08 NOTE — CARE PLAN
Problem: Knowledge Deficit - Standard  Goal: Patient and family/care givers will demonstrate understanding of plan of care, disease process/condition, diagnostic tests and medications  Outcome: Progressing     Problem: Pain - Standard  Goal: Alleviation of pain or a reduction in pain to the patient’s comfort goal  Outcome: Progressing   The patient is Stable - Low risk of patient condition declining or worsening    Shift Goals  Clinical Goals: monitor for chest pain  Patient Goals: to have heart cath    Progress made toward(s) clinical / shift goals:  Plan of care discussed with patient.     Patient is not progressing towards the following goals:

## 2024-03-08 NOTE — PROGRESS NOTES
Cardiology Follow Up Progress Note    Date of Service  3/8/2024    Attending Physician  Kehinde Whitaker M.D.    Chief Complaint   Chest pain     HPI  Julian Christine is a 63 y.o. male admitted 3/7/2024 with past medical history of diabetes, tobacco abuse 40 years, hypertension and hyperlipidemia . Transferred from [facility] with chest pressure.      Interim Events  No event overnight  Plan for LHC today   Vs stable   Creatinine 0.68    Review of Systems  Review of Systems   Constitutional:  Negative for chills, diaphoresis and fever.   HENT:  Negative for nosebleeds and trouble swallowing.    Respiratory:  Negative for cough, chest tightness and shortness of breath.    Cardiovascular:  Negative for chest pain, palpitations and leg swelling.   Gastrointestinal:  Negative for abdominal distention, abdominal pain and blood in stool.   Genitourinary:  Negative for hematuria.   Skin:  Negative for color change.   Neurological:  Negative for dizziness, syncope and numbness.   Psychiatric/Behavioral:  Negative for agitation and confusion. The patient is not nervous/anxious.        Vital signs in last 24 hours  Temp:  [36.2 °C (97.2 °F)-37.4 °C (99.4 °F)] 36.2 °C (97.2 °F)  Pulse:  [69-92] 72  Resp:  [16-77] 18  BP: (118-152)/(71-91) 118/71  SpO2:  [90 %-97 %] 90 %    Physical Exam  Physical Exam  Vitals and nursing note reviewed.   Constitutional:       Appearance: Normal appearance.   HENT:      Head: Normocephalic and atraumatic.   Eyes:      Pupils: Pupils are equal, round, and reactive to light.   Cardiovascular:      Rate and Rhythm: Normal rate and regular rhythm.      Heart sounds: Normal heart sounds. No murmur heard.  Pulmonary:      Effort: Pulmonary effort is normal.      Breath sounds: Normal breath sounds.   Abdominal:      General: Abdomen is flat.   Musculoskeletal:      Cervical back: Normal range of motion.      Right lower leg: No edema.      Left lower leg: No edema.   Skin:     General:  Skin is warm and dry.   Neurological:      General: No focal deficit present.      Mental Status: He is alert and oriented to person, place, and time.   Psychiatric:         Mood and Affect: Mood normal.         Behavior: Behavior normal.         Thought Content: Thought content normal.         Judgment: Judgment normal.         Lab Review  Lab Results   Component Value Date/Time    WBC 11.6 (H) 03/08/2024 03:56 AM    RBC 4.03 (L) 03/08/2024 03:56 AM    HEMOGLOBIN 14.2 03/08/2024 03:56 AM    HEMATOCRIT 39.7 (L) 03/08/2024 03:56 AM    MCV 98.5 (H) 03/08/2024 03:56 AM    MCH 35.2 (H) 03/08/2024 03:56 AM    MCHC 35.8 03/08/2024 03:56 AM    MPV 10.4 03/08/2024 03:56 AM      Lab Results   Component Value Date/Time    SODIUM 138 03/08/2024 03:56 AM    POTASSIUM 3.9 03/08/2024 03:56 AM    CHLORIDE 105 03/08/2024 03:56 AM    CO2 20 03/08/2024 03:56 AM    GLUCOSE 116 (H) 03/08/2024 03:56 AM    BUN 12 03/08/2024 03:56 AM    CREATININE 0.68 03/08/2024 03:56 AM      Lab Results   Component Value Date/Time    ASTSGOT 40 03/07/2024 10:12 AM    ALTSGPT 34 03/07/2024 10:12 AM     Lab Results   Component Value Date/Time    CHOLSTRLTOT 107 03/08/2024 03:56 AM    LDL 36 03/08/2024 03:56 AM    HDL 57 03/08/2024 03:56 AM    TRIGLYCERIDE 72 03/08/2024 03:56 AM    TROPONINT 689 (H) 03/08/2024 03:56 AM       Recent Labs     03/07/24  1012   NTPROBNP 185*       Cardiac Imaging and Procedures Review  Telemetry showed NSR     Echocardiogram:    3/7/2024  The left ventricle is normal in size and thickness.  The left ventricular ejection fraction is visually estimated to be 70%.  Reduced right ventricular systolic function.  Mild mitral regurgitation.  Normal inferior vena cava size and inspiratory collapse.    Cardiac Catheterization:  pending     Assessment/Plan  No new Assessment & Plan notes have been filed under this hospital service since the last note was generated.  Service: Cardiology    ACS:  - plan for LHC today, NPO since midnight    - continue asa, atorvastatin 80mg qd, add metoprolol 25mg BID   - continue heparin gtt   - ECHO showed ef 70%    2. Hypertension:  -  stable   - continue     3. Tobacco abuse:  - smoking cessation     The risks, benefits, and alternatives to coronary angiography with IV sedation were discussed in great detail. Specific risks mentioned include bleeding, infection, kidney damage, allergic reaction, cardiac perforation with possible tamponade requiring pericardiocentesis or possibly open heart surgery. In addition, we discussed that 10% of patients will experience small to moderate bruising at the site of the arterial puncture. Lastly, the risks of heart attack, stroke and death were discussed; the risk of major complications such as heart attack or stroke caused by the angiogram is approximately 1%; the risk of death is approximately 1 in 1000. The patient verbalized understanding of the potential complications and wishes to proceed with this procedure.    Thank you for allowing me to participate in the care of this patient.  I will continue to follow this patient    Please contact me with any questions.    ALON Pelayo.

## 2024-03-09 ENCOUNTER — APPOINTMENT (OUTPATIENT)
Dept: RADIOLOGY | Facility: MEDICAL CENTER | Age: 64
DRG: 232 | End: 2024-03-09
Attending: NURSE PRACTITIONER
Payer: COMMERCIAL

## 2024-03-09 LAB
ANION GAP SERPL CALC-SCNC: 14 MMOL/L (ref 7–16)
BASOPHILS # BLD AUTO: 1 % (ref 0–1.8)
BASOPHILS # BLD: 0.13 K/UL (ref 0–0.12)
BUN SERPL-MCNC: 11 MG/DL (ref 8–22)
CALCIUM SERPL-MCNC: 8.6 MG/DL (ref 8.5–10.5)
CHLORIDE SERPL-SCNC: 103 MMOL/L (ref 96–112)
CO2 SERPL-SCNC: 18 MMOL/L (ref 20–33)
CREAT SERPL-MCNC: 0.72 MG/DL (ref 0.5–1.4)
EOSINOPHIL # BLD AUTO: 0.58 K/UL (ref 0–0.51)
EOSINOPHIL NFR BLD: 4.6 % (ref 0–6.9)
ERYTHROCYTE [DISTWIDTH] IN BLOOD BY AUTOMATED COUNT: 50.1 FL (ref 35.9–50)
GFR SERPLBLD CREATININE-BSD FMLA CKD-EPI: 102 ML/MIN/1.73 M 2
GLUCOSE BLD STRIP.AUTO-MCNC: 106 MG/DL (ref 65–99)
GLUCOSE BLD STRIP.AUTO-MCNC: 112 MG/DL (ref 65–99)
GLUCOSE BLD STRIP.AUTO-MCNC: 151 MG/DL (ref 65–99)
GLUCOSE BLD STRIP.AUTO-MCNC: 156 MG/DL (ref 65–99)
GLUCOSE SERPL-MCNC: 111 MG/DL (ref 65–99)
HCT VFR BLD AUTO: 43 % (ref 42–52)
HGB BLD-MCNC: 14.9 G/DL (ref 14–18)
IMM GRANULOCYTES # BLD AUTO: 0.06 K/UL (ref 0–0.11)
IMM GRANULOCYTES NFR BLD AUTO: 0.5 % (ref 0–0.9)
LYMPHOCYTES # BLD AUTO: 3.74 K/UL (ref 1–4.8)
LYMPHOCYTES NFR BLD: 29.7 % (ref 22–41)
MCH RBC QN AUTO: 34.5 PG (ref 27–33)
MCHC RBC AUTO-ENTMCNC: 34.7 G/DL (ref 32.3–36.5)
MCV RBC AUTO: 99.5 FL (ref 81.4–97.8)
MONOCYTES # BLD AUTO: 1.54 K/UL (ref 0–0.85)
MONOCYTES NFR BLD AUTO: 12.2 % (ref 0–13.4)
NEUTROPHILS # BLD AUTO: 6.54 K/UL (ref 1.82–7.42)
NEUTROPHILS NFR BLD: 52 % (ref 44–72)
NRBC # BLD AUTO: 0 K/UL
NRBC BLD-RTO: 0 /100 WBC (ref 0–0.2)
PLATELET # BLD AUTO: 248 K/UL (ref 164–446)
PMV BLD AUTO: 10.8 FL (ref 9–12.9)
POTASSIUM SERPL-SCNC: 4 MMOL/L (ref 3.6–5.5)
RBC # BLD AUTO: 4.32 M/UL (ref 4.7–6.1)
SODIUM SERPL-SCNC: 135 MMOL/L (ref 135–145)
UFH PPP CHRO-ACNC: 0.25 IU/ML
UFH PPP CHRO-ACNC: 0.46 IU/ML
UFH PPP CHRO-ACNC: 0.53 IU/ML
WBC # BLD AUTO: 12.6 K/UL (ref 4.8–10.8)

## 2024-03-09 PROCEDURE — 36415 COLL VENOUS BLD VENIPUNCTURE: CPT

## 2024-03-09 PROCEDURE — 80048 BASIC METABOLIC PNL TOTAL CA: CPT

## 2024-03-09 PROCEDURE — A9270 NON-COVERED ITEM OR SERVICE: HCPCS | Performed by: INTERNAL MEDICINE

## 2024-03-09 PROCEDURE — 93880 EXTRACRANIAL BILAT STUDY: CPT

## 2024-03-09 PROCEDURE — 93970 EXTREMITY STUDY: CPT

## 2024-03-09 PROCEDURE — 99232 SBSQ HOSP IP/OBS MODERATE 35: CPT | Mod: FS | Performed by: INTERNAL MEDICINE

## 2024-03-09 PROCEDURE — 700111 HCHG RX REV CODE 636 W/ 250 OVERRIDE (IP): Performed by: EMERGENCY MEDICINE

## 2024-03-09 PROCEDURE — 93880 EXTRACRANIAL BILAT STUDY: CPT | Mod: 26 | Performed by: INTERNAL MEDICINE

## 2024-03-09 PROCEDURE — 82962 GLUCOSE BLOOD TEST: CPT | Mod: 91

## 2024-03-09 PROCEDURE — 93970 EXTREMITY STUDY: CPT | Mod: 26 | Performed by: INTERNAL MEDICINE

## 2024-03-09 PROCEDURE — 99232 SBSQ HOSP IP/OBS MODERATE 35: CPT | Performed by: STUDENT IN AN ORGANIZED HEALTH CARE EDUCATION/TRAINING PROGRAM

## 2024-03-09 PROCEDURE — 770020 HCHG ROOM/CARE - TELE (206)

## 2024-03-09 PROCEDURE — 700102 HCHG RX REV CODE 250 W/ 637 OVERRIDE(OP): Performed by: NURSE PRACTITIONER

## 2024-03-09 PROCEDURE — 85025 COMPLETE CBC W/AUTO DIFF WBC: CPT

## 2024-03-09 PROCEDURE — 99223 1ST HOSP IP/OBS HIGH 75: CPT | Mod: 57 | Performed by: THORACIC SURGERY (CARDIOTHORACIC VASCULAR SURGERY)

## 2024-03-09 PROCEDURE — A9270 NON-COVERED ITEM OR SERVICE: HCPCS | Performed by: NURSE PRACTITIONER

## 2024-03-09 PROCEDURE — 700102 HCHG RX REV CODE 250 W/ 637 OVERRIDE(OP): Performed by: INTERNAL MEDICINE

## 2024-03-09 PROCEDURE — 85520 HEPARIN ASSAY: CPT | Mod: 91

## 2024-03-09 RX ORDER — AMLODIPINE BESYLATE 10 MG/1
5 TABLET ORAL
Status: DISCONTINUED | OUTPATIENT
Start: 2024-03-09 | End: 2024-03-11

## 2024-03-09 RX ADMIN — ASPIRIN 81 MG 81 MG: 81 TABLET ORAL at 05:05

## 2024-03-09 RX ADMIN — HEPARIN SODIUM 2000 UNITS: 1000 INJECTION, SOLUTION INTRAVENOUS; SUBCUTANEOUS at 07:01

## 2024-03-09 RX ADMIN — METOPROLOL TARTRATE 25 MG: 25 TABLET, FILM COATED ORAL at 05:05

## 2024-03-09 RX ADMIN — HEPARIN SODIUM 16 UNITS/KG/HR: 5000 INJECTION, SOLUTION INTRAVENOUS at 12:56

## 2024-03-09 RX ADMIN — DOCUSATE SODIUM 50 MG AND SENNOSIDES 8.6 MG 2 TABLET: 8.6; 5 TABLET, FILM COATED ORAL at 05:04

## 2024-03-09 RX ADMIN — METOPROLOL TARTRATE 25 MG: 25 TABLET, FILM COATED ORAL at 16:48

## 2024-03-09 RX ADMIN — LEVOTHYROXINE SODIUM 125 MCG: 0.12 TABLET ORAL at 05:04

## 2024-03-09 RX ADMIN — ATORVASTATIN CALCIUM 80 MG: 80 TABLET, FILM COATED ORAL at 16:48

## 2024-03-09 RX ADMIN — INSULIN HUMAN 1 UNITS: 100 INJECTION, SOLUTION PARENTERAL at 11:54

## 2024-03-09 RX ADMIN — AMLODIPINE BESYLATE 5 MG: 10 TABLET ORAL at 11:33

## 2024-03-09 RX ADMIN — LISINOPRIL 20 MG: 20 TABLET ORAL at 05:04

## 2024-03-09 RX ADMIN — TAMSULOSIN HYDROCHLORIDE 0.4 MG: 0.4 CAPSULE ORAL at 05:04

## 2024-03-09 ASSESSMENT — ENCOUNTER SYMPTOMS
GASTROINTESTINAL NEGATIVE: 1
NEUROLOGICAL NEGATIVE: 1
TROUBLE SWALLOWING: 0
SHORTNESS OF BREATH: 0
DIZZINESS: 0
ABDOMINAL PAIN: 0
COLOR CHANGE: 0
AGITATION: 0
ABDOMINAL DISTENTION: 0
NERVOUS/ANXIOUS: 0
CONFUSION: 0
CHEST TIGHTNESS: 0
PALPITATIONS: 0
DIAPHORESIS: 0
FEVER: 0
COUGH: 0
BLOOD IN STOOL: 0
CHILLS: 0
PSYCHIATRIC NEGATIVE: 1
EYES NEGATIVE: 1
CONSTITUTIONAL NEGATIVE: 1
NUMBNESS: 0
RESPIRATORY NEGATIVE: 1

## 2024-03-09 ASSESSMENT — FIBROSIS 4 INDEX
FIB4 SCORE: 1.62
FIB4 SCORE: 1.62

## 2024-03-09 ASSESSMENT — PAIN DESCRIPTION - PAIN TYPE: TYPE: ACUTE PAIN

## 2024-03-09 NOTE — PROGRESS NOTES
Hospital Medicine Daily Progress Note    Date of Service  3/9/2024    Chief Complaint  Julian Christine is a 63 y.o. male admitted 3/7/2024 with NSTEMI    Hospital Course  60 male with past medical history of hyperlipidemia, type 2 diabetes mellitus, hypertension referred from outside facility for NSTEMI.  Noted to have mildly elevated troponin.  Initiated on heparin drip for ACS.  Echocardiogram with ejection fraction 70%, mild mitral regurgitation.  S/p cardiac catheterization which revealed multivessel disease.  CTS evaluated and scheduled for CABG on 3/11/2024.      Interval Problem Update      3/9/2024  Patient seen and examined in morning round.  Spouse present at bedside.   His vitals remained stable, afebrile  Labs reviewed noted with leukocytosis which is stress-induced, chemistry unremarkable sodium 135 renal function is stable  S/p coronary angiogram noted with multivessel disease.  Discussed  with cardiology Dr. Sal.  Scheduled for CABG on Monday      Telemetry monitoring  Continue on heparin drip, monitor for bleeding   Continue on aspirin, Lipitor, lisinopril, metoprolol  Repeat BMP in a.m. to monitor electrolytes and  renal function   Repeat CBC in a.m. to monitor white count and hemoglobin while on iv heparin drip  Scheduled for CABG on 3/11/2024  High risk of deterioration into arrhythmias , need close telemetry monitoring for ACS        I have discussed this patient's plan of care and discharge plan at IDT rounds today with Case Management, Nursing, Nursing leadership, and other members of the IDT team.    Consultants/Specialty  cardiology    Code Status  Full Code    Disposition  The patient is not medically cleared for discharge to home or a post-acute facility.  Anticipate discharge to: home with close outpatient follow-up    I have placed the appropriate orders for post-discharge needs.    Review of Systems  Review of Systems   Respiratory:  Negative for shortness of breath.     Cardiovascular:  Negative for chest pain.        Physical Exam  Temp:  [36.1 °C (97 °F)-37 °C (98.6 °F)] 36.8 °C (98.2 °F)  Pulse:  [62-78] 75  Resp:  [17-18] 18  BP: ()/(54-82) 116/72  SpO2:  [90 %-98 %] 98 %    Physical Exam  Constitutional:       Appearance: Normal appearance.   Cardiovascular:      Rate and Rhythm: Normal rate and regular rhythm.      Pulses: Normal pulses.      Heart sounds: Normal heart sounds.   Pulmonary:      Effort: Pulmonary effort is normal. No respiratory distress.      Breath sounds: Normal breath sounds.   Musculoskeletal:      Right lower leg: No edema.      Left lower leg: No edema.   Skin:     General: Skin is warm.      Capillary Refill: Capillary refill takes less than 2 seconds.   Neurological:      General: No focal deficit present.      Mental Status: He is alert and oriented to person, place, and time.   Psychiatric:         Mood and Affect: Mood normal.         Fluids    Intake/Output Summary (Last 24 hours) at 3/9/2024 1438  Last data filed at 3/8/2024 1930  Gross per 24 hour   Intake 240 ml   Output --   Net 240 ml       Laboratory  Recent Labs     03/07/24  1012 03/08/24  0356 03/09/24  0546   WBC 10.3 11.6* 12.6*   RBC 4.58* 4.03* 4.32*   HEMOGLOBIN 15.8 14.2 14.9   HEMATOCRIT 46.5 39.7* 43.0   .5* 98.5* 99.5*   MCH 34.5* 35.2* 34.5*   MCHC 34.0 35.8 34.7   RDW 50.5* 49.1 50.1*   PLATELETCT 311 266 248   MPV 10.2 10.4 10.8     Recent Labs     03/07/24  1012 03/08/24  0356 03/09/24  0546   SODIUM 136 138 135   POTASSIUM 4.1 3.9 4.0   CHLORIDE 101 105 103   CO2 18* 20 18*   GLUCOSE 118* 116* 111*   BUN 9 12 11   CREATININE 0.69 0.68 0.72   CALCIUM 8.8 8.2* 8.6     Recent Labs     03/07/24  1012   APTT 65.6*   INR 0.97         Recent Labs     03/08/24  0356   TRIGLYCERIDE 72   HDL 57   LDL 36       Imaging  US-VEIN MAPPING LOWER EXTREMITY BILAT   Final Result      US-CAROTID DOPPLER BILAT   Final Result      EC-ECHOCARDIOGRAM COMPLETE W/ CONT   Final Result       DX-CHEST-PORTABLE (1 VIEW)   Final Result      No acute cardiopulmonary abnormality.      CL-LEFT HEART CATHETERIZATION WITH POSSIBLE INTERVENTION    (Results Pending)        Assessment/Plan  * ACS (acute coronary syndrome) (HCC)- (present on admission)  Assessment & Plan  S/p cardiac catheterization which revealed multivessel disease.  CTS evaluated and scheduled for CABG on 3/11/2024.    Telemetry monitoring  Continue on heparin drip, monitor for bleeding   Continue on aspirin, Lipitor, lisinopril, metoprolol  Repeat BMP in a.m. to monitor electrolytes and  renal function   Repeat CBC in a.m. to monitor white count and hemoglobin while on iv heparin drip  High risk of deterioration into arrhythmias , need close telemetry monitoring for ACS      H/O splenectomy- (present on admission)  Assessment & Plan  Hx of    Hypothyroid- (present on admission)  Assessment & Plan  Continue home Synthroid    Type 2 diabetes mellitus, without long-term current use of insulin (HCC)- (present on admission)  Assessment & Plan  Not on insulin at home  Will start insulin sliding scale, Accu-Cheks and hypoglycemia protocol      Hyperlipidemia- (present on admission)  Assessment & Plan  Patient on 10 mg atorvastatin at home.  Increasing to 80 mg.  Checking lipid panel    History of Hodgkin's lymphoma- (present on admission)  Assessment & Plan  Radiation treatment 1985, no chemo    Primary hypertension- (present on admission)  Assessment & Plan  Patient reports having controlled blood pressure at home.  We will continue lisinopril, increasing to 20 mg.         VTE prophylaxis: IV heparin drip     I have performed a physical exam and reviewed and updated ROS and Plan today (3/9/2024). In review of yesterday's note (3/8/2024), there are no changes except as documented above.

## 2024-03-09 NOTE — CARE PLAN
The patient is Stable - Low risk of patient condition declining or worsening    Shift Goals  Clinical Goals: stable hemodynamics, heparin drip and monitor XA  Patient Goals: rest    Progress made toward(s) clinical / shift goals:        Patient is not progressing towards the following goals:      Problem: Knowledge Deficit - Standard  Goal: Patient and family/care givers will demonstrate understanding of plan of care, disease process/condition, diagnostic tests and medications  Description: Target End Date:  1-3 days or as soon as patient condition allows    Document in Patient Education    1.  Patient and family/caregiver oriented to unit, equipment, visitation policy and means for communicating concern  2.  Complete/review Learning Assessment  3.  Assess knowledge level of disease process/condition, treatment plan, diagnostic tests and medications  4.  Explain disease process/condition, treatment plan, diagnostic tests and medications  Outcome: Not Progressing     Problem: Pain - Standard  Goal: Alleviation of pain or a reduction in pain to the patient’s comfort goal  Description: Target End Date:  Prior to discharge or change in level of care    Document on Vitals flowsheet    1.  Document pain using the appropriate pain scale per order or unit policy  2.  Educate and implement non-pharmacologic comfort measures (i.e. relaxation, distraction, massage, cold/heat therapy, etc.)  3.  Pain management medications as ordered  4.  Reassess pain after pain med administration per policy  5.  If opiods administered assess patient's response to pain medication is appropriate per POSS sedation scale  6.  Follow pain management plan developed in collaboration with patient and interdisciplinary team (including palliative care or pain specialists if applicable)  Outcome: Not Progressing

## 2024-03-09 NOTE — PROCEDURES
Cardiac Catheterization Procedure Note    DATE: 3/8/2024    : Fabrice Liriano MD    PROCEDURES PERFORMED:  Left heart catheterization  Coronary angiography  Intravascular ultrasound of the right coronary artery  Percutaneous transluminal coronary angioplasty of the proximal right coronary  Moderate conscious sedation    INDICATIONS:  The patient is a 63-year-old gentleman referred for cardiac catheterization to evaluate worsening anginal symptoms with an elevated troponin concerning for a true type I myocardial infarction.    CONSENT:  The complete alternatives, risks, and benefits of the procedure were explained to the patient. Signed informed consent was obtained and placed in the chart prior to the procedure.  A timeout was performed prior to beginning the procedure.    MEDICATIONS:  Lidocaine  Fentanyl  Midazolam  Nitroglycerin  Verapamil  Heparin    MODERATE CONSCIOUS SEDATION:  I personally supervised the administration of moderate conscious sedation by the nursing staff for 32 minutes.  Sedation start time: 3:52 PM  Sedation end time: 4:24 PM    CONTRAST: Omnipaque 55 cc    ACCESS: 6-Micronesian Glidesheath in the right radial artery.    ESTIMATED BLOOD LOSS: 10 cc    COMPLICATIONS: None    PROCEDURE IN DETAIL:  The patient was brought to the cardiac catheterization laboratory in the fasting state.  The skin over the right wrist was prepped and draped in the usual sterile fashion. Lidocaine infiltration was used to anesthetize the tissue over the right radial artery.  Using the micropuncture technique, a 6-Micronesian Glidesheath was inserted in the right radial artery.  A 5-Micronesian Darian diagnostic catheter was then advanced over a standard J-wire into the left ventricular cavity where it was gently aspirated, flushed, and then withdrawn across the aortic valve with sequential pressures measured.  This catheter was then used to engage the ostium of the left main coronary artery and cineangiograms were obtained  in multiple projections for complete evaluation of the left coronary system.  This catheter was then used to engage the ostium of the right coronary artery and and cineangiograms were obtained in multiple projections for complete evaluation of the right coronary system.  Following completion of coronary angiography, we proceeded with intravascular ultrasound for stent planning.    The Darian catheter was exchanged over a J-wire for a 6-Ethiopian JR-4 guide catheter.  This catheter was used to re-engage the ostium of the right coronary artery.  A Run-through wire was then advanced across the lesion in the proximal RCA and was placed in the distal aspect of the vessel.  An [place] IVUS catheter was then advanced over the guidewire, but could not pass through the heavily calcified proximal lesion.  The true vessel diameter proximal to the lesion appeared to be approximately 4.0 mm with mild to moderate concentric mixed soft and calcific plaque.  As the lesion appeared to be partially thrombotic by angiography, we then attempted intervention.    Both a 3.0 x 20 mm compliant balloon and a 2.0 x 12 mm compliant balloon could not cross the lesion.  We then attempted to pass a 1.5 x 12 mm  Pusher balloon.  This balloon was able to be wedged into the lesion where it was inflated to nominal pressure in order to disrupt any existing thrombus.  As the patient was a diabetic with a chronic total occlusion of the LAD, severe disease proximal to a large diagonal branch, and extensive and severe calcific disease in the RCA, no further intervention was attempted and the decision was made to have the patient evaluated for coronary artery bypass grafting.  At the completion of the case, all wires, catheters, and sheaths were removed. A TR band was placed using the patent hemostasis technique.    HEMODYNAMICS:   Aortic pressure: 100/48 mmHg  Pre A-wave pressure: 12 mmHg  No significant aortic gradient on pullback    CORONARY  ANGIOGRAPHY:  The left main coronary artery has luminal disease and bifurcates into the left anterior descending and left circumflex coronary arteries.  The left anterior descending coronary artery is a large, calcified, transapical vessel with proximal 30% disease, mid 70% disease, and then a mid chronic total occlusion just after the takeoff of a large second diagonal branch..  It supplies a small first diagonal branch and a large second diagonal branch with diffuse mild disease.  The distal vessel refills primarily by left to left collaterals from the second diagonal branch and faint collaterals from the right coronary artery distribution.  The left circumflex coronary artery is a small, nondominant vessel with ostial 30% disease, proximal luminal irregularities, and mid 70% disease at the bifurcation of the second obtuse marginal branch.  It supplies three small obtuse branches with diffuse mild disease.  The right coronary artery is a large, heavily calcified, dominant vessel with severe calcific proximal 95% that appears to have some degree of associated thrombus, a long segment of calcific mid 50% disease, and distal luminal irregularities.  Distally, it bifurcates into a moderate posterior descending coronary and a large posterolateral branch with luminal irregularities.    INTRAVASCULAR ULTRASOUND:  See IVUS findings in procedure details above.    IMPRESSION:  Severe obstructive three-vessel coronary artery disease involving the proximal right coronary artery, the small mid left circumflex coronary artery, the large second diagonal branch, and the chronically occluded mid left anterior descending coronary.  Normal left heart filling pressures.    RECOMMENDATIONS:  Return to floor with continued care per primary service.  TR band release per protocol.  Restart heparin drip in 2 hours at previous rate if no access site bleeding complications.  Evaluation by Cardiothoracic Surgery for coronary artery bypass  grafting with consideration for complex percutaneous coronary intervention if the patient is not a surgical candidate.    NOTIFICATION:  The patient's family was notified of the results of his cardiac catheterization result.

## 2024-03-09 NOTE — PROGRESS NOTES
Cardiology Follow Up Progress Note    Date of Service  3/9/2024    Attending Physician  Kehinde Whitaker M.D.    Chief Complaint   Chest pain     HPI  Julian Christine is a 63 y.o. male admitted 3/7/2024 with past medical history of diabetes, tobacco abuse 40 years, hypertension, history of hodgkin's lymphoma, and hyperlipidemia .     Transferred from [facility], operated by [facility] with chest pressure.  Cath showed MVCAD    Interim Events  No event overnight  CTS plan for CABG on Monday   Vs stable   Right radial is soft and good peripheral pulse     Review of Systems  Review of Systems   Constitutional:  Negative for chills, diaphoresis and fever.   HENT:  Negative for nosebleeds and trouble swallowing.    Respiratory:  Negative for cough, chest tightness and shortness of breath.    Cardiovascular:  Negative for chest pain, palpitations and leg swelling.   Gastrointestinal:  Negative for abdominal distention, abdominal pain and blood in stool.   Genitourinary:  Negative for hematuria.   Skin:  Negative for color change.   Neurological:  Negative for dizziness, syncope and numbness.   Psychiatric/Behavioral:  Negative for agitation and confusion. The patient is not nervous/anxious.        Vital signs in last 24 hours  Temp:  [36.1 °C (97 °F)-37 °C (98.6 °F)] 37 °C (98.6 °F)  Pulse:  [62-78] 65  Resp:  [17-18] 18  BP: ()/(54-82) 121/71  SpO2:  [90 %-95 %] 93 %    Physical Exam  Physical Exam  Vitals and nursing note reviewed.   Constitutional:       Appearance: Normal appearance.   HENT:      Head: Normocephalic and atraumatic.   Eyes:      Pupils: Pupils are equal, round, and reactive to light.   Cardiovascular:      Rate and Rhythm: Normal rate and regular rhythm.      Heart sounds: Normal heart sounds. No murmur heard.  Pulmonary:      Effort: Pulmonary effort is normal.      Breath sounds: Normal breath sounds.   Abdominal:      General: Abdomen is flat.   Musculoskeletal:      Cervical back: Normal range of motion.       Right lower leg: No edema.      Left lower leg: No edema.   Skin:     General: Skin is warm and dry.   Neurological:      General: No focal deficit present.      Mental Status: He is alert and oriented to person, place, and time.   Psychiatric:         Mood and Affect: Mood normal.         Behavior: Behavior normal.         Thought Content: Thought content normal.         Judgment: Judgment normal.         Lab Review  Lab Results   Component Value Date/Time    WBC 12.6 (H) 03/09/2024 05:46 AM    RBC 4.32 (L) 03/09/2024 05:46 AM    HEMOGLOBIN 14.9 03/09/2024 05:46 AM    HEMATOCRIT 43.0 03/09/2024 05:46 AM    MCV 99.5 (H) 03/09/2024 05:46 AM    MCH 34.5 (H) 03/09/2024 05:46 AM    MCHC 34.7 03/09/2024 05:46 AM    MPV 10.8 03/09/2024 05:46 AM      Lab Results   Component Value Date/Time    SODIUM 135 03/09/2024 05:46 AM    POTASSIUM 4.0 03/09/2024 05:46 AM    CHLORIDE 103 03/09/2024 05:46 AM    CO2 18 (L) 03/09/2024 05:46 AM    GLUCOSE 111 (H) 03/09/2024 05:46 AM    BUN 11 03/09/2024 05:46 AM    CREATININE 0.72 03/09/2024 05:46 AM      Lab Results   Component Value Date/Time    ASTSGOT 40 03/07/2024 10:12 AM    ALTSGPT 34 03/07/2024 10:12 AM     Lab Results   Component Value Date/Time    CHOLSTRLTOT 107 03/08/2024 03:56 AM    LDL 36 03/08/2024 03:56 AM    HDL 57 03/08/2024 03:56 AM    TRIGLYCERIDE 72 03/08/2024 03:56 AM    TROPONINT 689 (H) 03/08/2024 03:56 AM       Recent Labs     03/07/24  1012   NTPROBNP 185*       Cardiac Imaging and Procedures Review  Telemetry showed NSR     Echocardiogram:    3/7/2024  The left ventricle is normal in size and thickness.  The left ventricular ejection fraction is visually estimated to be 70%.  Reduced right ventricular systolic function.  Mild mitral regurgitation.  Normal inferior vena cava size and inspiratory collapse.    Cardiac Catheterization:    3/8/2024  CORONARY ANGIOGRAPHY:  The left main coronary artery has luminal disease and bifurcates into the left anterior  descending and left circumflex coronary arteries.  The left anterior descending coronary artery is a large, calcified, transapical vessel with proximal 30% disease, mid 70% disease, and then a mid chronic total occlusion just after the takeoff of a large second diagonal branch..  It supplies a small first diagonal branch and a large second diagonal branch with diffuse mild disease.  The distal vessel refills primarily by left to left collaterals from the second diagonal branch and faint collaterals from the right coronary artery distribution.  The left circumflex coronary artery is a small, nondominant vessel with ostial 30% disease, proximal luminal irregularities, and mid 70% disease at the bifurcation of the second obtuse marginal branch.  It supplies three small obtuse branches with diffuse mild disease.  The right coronary artery is a large, heavily calcified, dominant vessel with severe calcific proximal 95% that appears to have some degree of associated thrombus, a long segment of calcific mid 50% disease, and distal luminal irregularities.  Distally, it bifurcates into a moderate posterior descending coronary and a large posterolateral branch with luminal irregularities.     INTRAVASCULAR ULTRASOUND:  See IVUS findings in procedure details above.     IMPRESSION:  Severe obstructive three-vessel coronary artery disease involving the proximal right coronary artery, the small mid left circumflex coronary artery, the large second diagonal branch, and the chronically occluded mid left anterior descending coronary.  Normal left heart filling pressures.    Assessment/Plan  No new Assessment & Plan notes have been filed under this hospital service since the last note was generated.  Service: Cardiology    ACS:  - cath showed MVCAD, plan for CABG on Monday per CTS   - continue asa, atorvastatin 80mg qd, and metoprolol 25mg BID   - hold ace due to surgery on Monday, started on amlodipine for blood pressure   -  continue heparin gtt   - ECHO showed ef 70%    2. Hypertension:  -  stable   - as noted above     3. Tobacco abuse:  - smoking cessation     4. Diabetes     I personally spent a total of 15 minutes which includes face-to-face time and non-face-to-face time spent on preparing to see the patient, reviewing hospital notes and tests, obtaining history from the patient, performing a medically appropriate exam, counseling and educating the patient, ordering medications/tests/procedures/referrals as clinically indicated, and documenting information in the electronic medical record.      Thank you for allowing me to participate in the care of this patient.  I will continue to follow this patient    Please contact me with any questions.    ALON Pelayo.

## 2024-03-09 NOTE — PROGRESS NOTES
Bedside report received from off going RN/tech: Melinda, assumed care of patient.     Fall Risk Score: LOW RISK  Fall risk interventions in place: Place yellow fall risk ID band on patient, Provide patient/family education based on risk assessment, Educate patient/family to call staff for assistance when getting out of bed, Place fall precaution signage outside patient door, and Place patient in room close to nursing station  Bed type: Regular (Rene Score less than 17 interventions in place)  Patient on cardiac monitor: Yes  IVF/IV medications: Infusion per MAR (List Med(s)) Heparin 14units/hr  Oxygen: Room Air  Bedside sitter: Not Applicable   Isolation: Not applicable

## 2024-03-09 NOTE — CONSULTS
REFERRING PHYSICIAN: Torrey Sal MD.     CONSULTING PHYSICIAN: Salas Cahpman DO     CHIEF COMPLAINT: Chest pain    HISTORY OF PRESENT ILLNESS: The patient is a 63 y.o. male with past medical history of diabetes, 40 years of tobacco use, hypertension, and dyslipidemia who presents with chest pain. This started one day prior to admission. He denies shortness of breath, orthopnea, PND, dizziness, and syncope. No aggravating factors identified. Chest pain was relieved after aspirin and heparin. He presented to the emergency department in [place] where he was found to have elevated troponin and was transferred to [facility]. Angiogram revealed multivessel coronary artery disease.       PAST MEDICAL HISTORY:   Active Ambulatory Problems     Diagnosis Date Noted    No Active Ambulatory Problems     Resolved Ambulatory Problems     Diagnosis Date Noted    No Resolved Ambulatory Problems     Past Medical History:   Diagnosis Date    Diabetes (HCC)     Hypertension        PAST SURGICAL HISTORY:   Past Surgical History:   Procedure Laterality Date    SPLENECTOMY          ALLERGIES: No Known Allergies     CURRENT MEDICATIONS:   Current Facility-Administered Medications:     metoprolol tartrate (Lopressor) tablet 25 mg, 25 mg, Oral, BID, Krari Uriostegui A.P.R.N., 25 mg at 03/09/24 0505    heparin infusion 25,000 units in 500 mL 0.45% NACL, 0-30 Units/kg/hr, Intravenous, Continuous, Ady Ramirez M.D., Last Rate: 24.7 mL/hr at 03/09/24 0658, 16 Units/kg/hr at 03/09/24 0658    heparin injection 2,000 Units, 2,000 Units, Intravenous, PRN, Ady Ramirez M.D., 2,000 Units at 03/09/24 0701    nitroglycerin (Nitrostat) tablet 0.4 mg, 0.4 mg, Sublingual, Q5 MIN PRN, Adrian Campbell M.D.    tamsulosin (Flomax) capsule 0.4 mg, 0.4 mg, Oral, DAILY, Adrian Campbell M.D., 0.4 mg at 03/09/24 0504    atorvastatin (Lipitor) tablet 80 mg, 80 mg, Oral, Q EVENING, Adrian Campbell M.D., 80 mg at  03/08/24 1709    levothyroxine (Synthroid) tablet 125 mcg, 125 mcg, Oral, QAMELISSA, Adrian Campbell M.D., 125 mcg at 03/09/24 0504    lisinopril (Prinivil) tablet 20 mg, 20 mg, Oral, QAM, Adrian Campbell M.D., 20 mg at 03/09/24 0504    acetaminophen (Tylenol) tablet 650 mg, 650 mg, Oral, Q6HRS PRN, Adrian Campbell M.D.    senna-docusate (Pericolace Or Senokot S) 8.6-50 MG per tablet 2 Tablet, 2 Tablet, Oral, BID, 2 Tablet at 03/09/24 0504 **AND** polyethylene glycol/lytes (Miralax) Packet 1 Packet, 1 Packet, Oral, QDAY PRN, Adrian Campbell M.D.    ondansetron (Zofran) syringe/vial injection 4 mg, 4 mg, Intravenous, Q4HRS PRN, Adrian Campbell M.D.    ondansetron (Zofran ODT) dispertab 4 mg, 4 mg, Oral, Q4HRS PRN, Adrian Campbell M.D.    promethazine (Phenergan) tablet 12.5-25 mg, 12.5-25 mg, Oral, Q4HRS PRN, Adrian Campbell M.D.    promethazine (Phenergan) suppository 12.5-25 mg, 12.5-25 mg, Rectal, Q4HRS PRN, Adrian Campbell M.D.    prochlorperazine (Compazine) injection 5-10 mg, 5-10 mg, Intravenous, Q4HRS PRN, Adrian Campbell M.D.    insulin regular (HumuLIN R,NovoLIN R) injection, 1-6 Units, Subcutaneous, Q6HRS **AND** POC blood glucose manual result, , , Q6H **AND** NOTIFY MD and PharmD, , , Once **AND** Administer 20 grams of glucose (approximately 8 ounces of fruit juice) every 15 minutes PRN FSBG less than 70 mg/dL, , , PRN **AND** dextrose 50% (D50W) injection 25 g, 25 g, Intravenous, Q15 MIN PRN, Adrian Campbell M.D.    aspirin (Asa) chewable tab 81 mg, 81 mg, Oral, DAILY, Tyler Sal M.D., 81 mg at 03/09/24 0505    FAMILY HISTORY: History reviewed. No pertinent family history.     SOCIAL HISTORY:   Social History     Socioeconomic History    Marital status:      Spouse name: Not on file    Number of children: Not on file    Years of education: Not on file    Highest education level: Not on file   Occupational History    Not on file   Tobacco Use     "Smoking status: Never    Smokeless tobacco: Never   Vaping Use    Vaping Use: Never used   Substance and Sexual Activity    Alcohol use: Yes     Comment: 2-3 scotch drinks nightly    Drug use: Yes     Types: Inhaled     Comment: marijuana    Sexual activity: Not on file   Other Topics Concern    Not on file   Social History Narrative    Not on file     Social Determinants of Health     Financial Resource Strain: Not on file   Food Insecurity: Not on file   Transportation Needs: Not on file   Physical Activity: Not on file   Stress: Not on file   Social Connections: Not on file   Intimate Partner Violence: Not on file   Housing Stability: Not on file       REVIEW OF SYSTEMS:  Review of Systems   Constitutional: Negative.    HENT: Negative.     Eyes: Negative.    Respiratory: Negative.     Cardiovascular:  Positive for chest pain.   Gastrointestinal: Negative.    Musculoskeletal:  Positive for joint pain.   Skin: Negative.    Neurological: Negative.    Endo/Heme/Allergies: Negative.    Psychiatric/Behavioral: Negative.     All other systems reviewed and are negative.        PHYSICAL EXAMINATION:    /71   Pulse 65   Temp 37 °C (98.6 °F) (Temporal)   Resp 18   Ht 1.778 m (5' 10\")   Wt 76.6 kg (168 lb 14 oz)   SpO2 93%   BMI 24.23 kg/m²    Physical Exam  Vitals and nursing note reviewed. Exam conducted with a chaperone present.   Constitutional:       General: He is not in acute distress.  HENT:      Head: Normocephalic and atraumatic.      Nose: Nose normal.   Eyes:      Conjunctiva/sclera: Conjunctivae normal.      Pupils: Pupils are equal, round, and reactive to light.   Neck:      Vascular: No JVD.      Trachea: No tracheal deviation.   Cardiovascular:      Rate and Rhythm: Normal rate and regular rhythm.   Pulmonary:      Effort: Pulmonary effort is normal. No respiratory distress.      Breath sounds: Normal breath sounds. No stridor.   Abdominal:      General: Bowel sounds are normal. There is no " distension.      Palpations: Abdomen is soft.      Tenderness: There is no abdominal tenderness.   Musculoskeletal:         General: No tenderness. Normal range of motion.      Cervical back: Normal range of motion and neck supple.   Skin:     General: Skin is warm and dry.   Neurological:      Mental Status: He is alert and oriented to person, place, and time.      Coordination: Coordination normal.      Gait: Gait is intact.   Psychiatric:         Mood and Affect: Mood and affect normal.         Cognition and Memory: Memory normal.             LABS REVIEWED:  Lab Results   Component Value Date/Time    SODIUM 135 03/09/2024 05:46 AM    POTASSIUM 4.0 03/09/2024 05:46 AM    CHLORIDE 103 03/09/2024 05:46 AM    CO2 18 (L) 03/09/2024 05:46 AM    GLUCOSE 111 (H) 03/09/2024 05:46 AM    BUN 11 03/09/2024 05:46 AM    CREATININE 0.72 03/09/2024 05:46 AM      Lab Results   Component Value Date/Time    PROTHROMBTM 13.0 03/07/2024 10:12 AM    INR 0.97 03/07/2024 10:12 AM      Lab Results   Component Value Date/Time    WBC 12.6 (H) 03/09/2024 05:46 AM    RBC 4.32 (L) 03/09/2024 05:46 AM    HEMOGLOBIN 14.9 03/09/2024 05:46 AM    HEMATOCRIT 43.0 03/09/2024 05:46 AM    MCV 99.5 (H) 03/09/2024 05:46 AM    MCH 34.5 (H) 03/09/2024 05:46 AM    MCHC 34.7 03/09/2024 05:46 AM    MPV 10.8 03/09/2024 05:46 AM    NEUTSPOLYS 52.00 03/09/2024 05:46 AM    LYMPHOCYTES 29.70 03/09/2024 05:46 AM    MONOCYTES 12.20 03/09/2024 05:46 AM    EOSINOPHILS 4.60 03/09/2024 05:46 AM    BASOPHILS 1.00 03/09/2024 05:46 AM        IMAGING REVIEWED AND INTERPRETED:    ECHOCARDIOGRAM   3/7/24 [facility]  CONCLUSIONS  No prior study is available for comparison.   The left ventricle is normal in size and thickness.  The left ventricular ejection fraction is visually estimated to be 70%.  Reduced right ventricular systolic function.  Mild mitral regurgitation.  Normal inferior vena cava size and inspiratory collapse.    CARDIAC CATHETERIZATION   3/8/24 [facility]   CORONARY  ANGIOGRAPHY:  The left main coronary artery has luminal disease and bifurcates into the left anterior descending and left circumflex coronary arteries.  The left anterior descending coronary artery is a large, calcified, transapical vessel with proximal 30% disease, mid 70% disease, and then a mid chronic total occlusion just after the takeoff of a large second diagonal branch..  It supplies a small first diagonal branch and a large second diagonal branch with diffuse mild disease.  The distal vessel refills primarily by left to left collaterals from the second diagonal branch and faint collaterals from the right coronary artery distribution.  The left circumflex coronary artery is a small, nondominant vessel with ostial 30% disease, proximal luminal irregularities, and mid 70% disease at the bifurcation of the second obtuse marginal branch.  It supplies three small obtuse branches with diffuse mild disease.  The right coronary artery is a large, heavily calcified, dominant vessel with severe calcific proximal 95% that appears to have some degree of associated thrombus, a long segment of calcific mid 50% disease, and distal luminal irregularities.  Distally, it bifurcates into a moderate posterior descending coronary and a large posterolateral branch with luminal irregularities.    IMPRESSION:  Non-STEMI, multivessel coronary artery disease, type 2 diabetes mellitus, hypertension, dyslipidemia, history of splenectomy, history of Hodgkin's lymphoma      PLAN:    I recommend coronary bypass grafting x 3-4 with Endo vein harvest  The procedure, its risks, benefits, potential complications and alternative treatments were discussed with the patient in detail including the risks should he decide not to undergo my recommended treatment. All of his questions were answered to his satisfaction and he is willing to proceed with the operation. The risks include death, stroke, infection: to include a rare bacterial infection  related to the use of the heart/lung machine, wan-operative myocardial infarction, dysrhythmias, diaphragmatic paralysis, chest wall paresthesia, tracheostomy, kidney or other organ failure, possible return to the operating room for bleeding, bleeding requiring transfusion with its attendant risks including AIDS or hepatitis, dehiscence of surgical incisions, respiratory complications including the need for prolonged ventilator support, Protamine or other drug reaction, peripheral neuropathy, loss of limb, and miscount of surgical items. The operative mortality risk is approximately 1%. The STS mortality risk score is 0.5% and the morbidity and mortality risk score is 3.7%. The scores were discussed with patient.     Thank you for this very challenging consultation and participation in the patient’s care.  I will keep you apprised of all future developments.      The operation is scheduled for this coming Monday    Sincerely,     Salas Chapman DO      I,  Salas Chapman DO performed a substantial portion of the service face-to-face with the same patient on the same date of service. I have reviewed and agree with the care plan and complexity of problems documented by me, Salas Chapman DO and/or MING Wiggins. I was personally involved in the medical decision making, including the information below:  reviewing and interpreting the films, conducted elements of the history and physical exam, and provided the plan of care. All medical decision making was made by me.

## 2024-03-09 NOTE — PROGRESS NOTES
Assumed care on patient post bedside report. Alert and oriented x 4. In no apparent distress. Vss. Denies pain. Heparin infusing without difficulty and rate verified. Day RN attempted to deflate TR band but some bleeding noted and 6cc of air was put back in and will monitor site. Pulses palpable and no hematoma present.    Tele on and heart rhythm and rate checked on monitor.    Plan of care - monitor hemodynamics, lab values, heparin drip and maintain safety. He verbalized understanding.    Safety precautions in place are side rails up x 2, bed to lowest level, alarms on. Appropriate to use call light for needs and assistance. Will round hourly and as needed.

## 2024-03-09 NOTE — PROGRESS NOTES
Bedside report received from off going RN/tech: NOC RN, assumed care of patient.   Pt a&o 4 RA denies pain at this time.   POC discussed, no questions at this time.     Fall Risk Score: LOW RISK  Fall risk interventions in place: Provide patient/family education based on risk assessment, Educate patient/family to call staff for assistance when getting out of bed, Place fall precaution signage outside patient door, and Place patient in room close to nursing station  Bed type: Regular (Rene Score less than 17 interventions in place)  Patient on cardiac monitor: Yes  IVF/IV medications: Heparin IV  Oxygen: Room Air  Bedside sitter: Not Applicable   Isolation: Not applicable

## 2024-03-10 ENCOUNTER — APPOINTMENT (OUTPATIENT)
Dept: RADIOLOGY | Facility: MEDICAL CENTER | Age: 64
DRG: 232 | End: 2024-03-10
Attending: NURSE PRACTITIONER
Payer: COMMERCIAL

## 2024-03-10 LAB
ABO + RH BLD: NORMAL
ABO GROUP BLD: NORMAL
ALBUMIN SERPL BCP-MCNC: 3.8 G/DL (ref 3.2–4.9)
ALBUMIN/GLOB SERPL: 1.4 G/DL
ALP SERPL-CCNC: 68 U/L (ref 30–99)
ALT SERPL-CCNC: 29 U/L (ref 2–50)
ANION GAP SERPL CALC-SCNC: 12 MMOL/L (ref 7–16)
ANION GAP SERPL CALC-SCNC: 14 MMOL/L (ref 7–16)
APPEARANCE UR: CLEAR
APTT PPP: 83.1 SEC (ref 24.7–36)
AST SERPL-CCNC: 29 U/L (ref 12–45)
BASOPHILS # BLD AUTO: 1.2 % (ref 0–1.8)
BASOPHILS # BLD AUTO: 3.5 % (ref 0–1.8)
BASOPHILS # BLD: 0.13 K/UL (ref 0–0.12)
BASOPHILS # BLD: 0.4 K/UL (ref 0–0.12)
BILIRUB SERPL-MCNC: 0.6 MG/DL (ref 0.1–1.5)
BILIRUB UR QL STRIP.AUTO: NEGATIVE
BLD GP AB SCN SERPL QL: NORMAL
BUN SERPL-MCNC: 11 MG/DL (ref 8–22)
BUN SERPL-MCNC: 11 MG/DL (ref 8–22)
CALCIUM ALBUM COR SERPL-MCNC: 9.2 MG/DL (ref 8.5–10.5)
CALCIUM SERPL-MCNC: 8.9 MG/DL (ref 8.5–10.5)
CALCIUM SERPL-MCNC: 9 MG/DL (ref 8.5–10.5)
CHLORIDE SERPL-SCNC: 103 MMOL/L (ref 96–112)
CHLORIDE SERPL-SCNC: 104 MMOL/L (ref 96–112)
CO2 SERPL-SCNC: 21 MMOL/L (ref 20–33)
CO2 SERPL-SCNC: 21 MMOL/L (ref 20–33)
COLOR UR: YELLOW
COMMENT NL1176: NORMAL
CREAT SERPL-MCNC: 0.77 MG/DL (ref 0.5–1.4)
CREAT SERPL-MCNC: 0.89 MG/DL (ref 0.5–1.4)
EKG IMPRESSION: NORMAL
EOSINOPHIL # BLD AUTO: 0.55 K/UL (ref 0–0.51)
EOSINOPHIL # BLD AUTO: 0.91 K/UL (ref 0–0.51)
EOSINOPHIL NFR BLD: 5.1 % (ref 0–6.9)
EOSINOPHIL NFR BLD: 8 % (ref 0–6.9)
ERYTHROCYTE [DISTWIDTH] IN BLOOD BY AUTOMATED COUNT: 48.8 FL (ref 35.9–50)
ERYTHROCYTE [DISTWIDTH] IN BLOOD BY AUTOMATED COUNT: 49.3 FL (ref 35.9–50)
EST. AVERAGE GLUCOSE BLD GHB EST-MCNC: 128 MG/DL
GFR SERPLBLD CREATININE-BSD FMLA CKD-EPI: 100 ML/MIN/1.73 M 2
GFR SERPLBLD CREATININE-BSD FMLA CKD-EPI: 96 ML/MIN/1.73 M 2
GLOBULIN SER CALC-MCNC: 2.8 G/DL (ref 1.9–3.5)
GLUCOSE BLD STRIP.AUTO-MCNC: 110 MG/DL (ref 65–99)
GLUCOSE BLD STRIP.AUTO-MCNC: 143 MG/DL (ref 65–99)
GLUCOSE BLD STRIP.AUTO-MCNC: 195 MG/DL (ref 65–99)
GLUCOSE BLD STRIP.AUTO-MCNC: 85 MG/DL (ref 65–99)
GLUCOSE SERPL-MCNC: 101 MG/DL (ref 65–99)
GLUCOSE SERPL-MCNC: 119 MG/DL (ref 65–99)
GLUCOSE UR STRIP.AUTO-MCNC: NEGATIVE MG/DL
HBA1C MFR BLD: 6.1 % (ref 4–5.6)
HCT VFR BLD AUTO: 40.9 % (ref 42–52)
HCT VFR BLD AUTO: 42.6 % (ref 42–52)
HGB BLD-MCNC: 14.5 G/DL (ref 14–18)
HGB BLD-MCNC: 14.8 G/DL (ref 14–18)
IMM GRANULOCYTES # BLD AUTO: 0.03 K/UL (ref 0–0.11)
IMM GRANULOCYTES NFR BLD AUTO: 0.3 % (ref 0–0.9)
INR PPP: 1.04 (ref 0.87–1.13)
KETONES UR STRIP.AUTO-MCNC: NEGATIVE MG/DL
LEUKOCYTE ESTERASE UR QL STRIP.AUTO: NEGATIVE
LYMPHOCYTES # BLD AUTO: 4.14 K/UL (ref 1–4.8)
LYMPHOCYTES # BLD AUTO: 4.49 K/UL (ref 1–4.8)
LYMPHOCYTES NFR BLD: 36.3 % (ref 22–41)
LYMPHOCYTES NFR BLD: 41.5 % (ref 22–41)
MANUAL DIFF BLD: NORMAL
MCH RBC QN AUTO: 34.7 PG (ref 27–33)
MCH RBC QN AUTO: 35.1 PG (ref 27–33)
MCHC RBC AUTO-ENTMCNC: 34.7 G/DL (ref 32.3–36.5)
MCHC RBC AUTO-ENTMCNC: 35.5 G/DL (ref 32.3–36.5)
MCV RBC AUTO: 99 FL (ref 81.4–97.8)
MCV RBC AUTO: 99.8 FL (ref 81.4–97.8)
MICRO URNS: NORMAL
MONOCYTES # BLD AUTO: 0.91 K/UL (ref 0–0.85)
MONOCYTES # BLD AUTO: 1.33 K/UL (ref 0–0.85)
MONOCYTES NFR BLD AUTO: 12.3 % (ref 0–13.4)
MONOCYTES NFR BLD AUTO: 8 % (ref 0–13.4)
MORPHOLOGY BLD-IMP: NORMAL
NEUTROPHILS # BLD AUTO: 4.3 K/UL (ref 1.82–7.42)
NEUTROPHILS # BLD AUTO: 5.04 K/UL (ref 1.82–7.42)
NEUTROPHILS NFR BLD: 39.6 % (ref 44–72)
NEUTROPHILS NFR BLD: 43.3 % (ref 44–72)
NEUTS BAND NFR BLD MANUAL: 0.9 % (ref 0–10)
NITRITE UR QL STRIP.AUTO: NEGATIVE
NRBC # BLD AUTO: 0 K/UL
NRBC # BLD AUTO: 0 K/UL
NRBC BLD-RTO: 0 /100 WBC (ref 0–0.2)
NRBC BLD-RTO: 0 /100 WBC (ref 0–0.2)
PH UR STRIP.AUTO: 5.5 [PH] (ref 5–8)
PLATELET # BLD AUTO: 247 K/UL (ref 164–446)
PLATELET # BLD AUTO: 250 K/UL (ref 164–446)
PLATELET BLD QL SMEAR: NORMAL
PMV BLD AUTO: 10.7 FL (ref 9–12.9)
PMV BLD AUTO: 11 FL (ref 9–12.9)
POTASSIUM SERPL-SCNC: 4 MMOL/L (ref 3.6–5.5)
POTASSIUM SERPL-SCNC: 4 MMOL/L (ref 3.6–5.5)
PROT SERPL-MCNC: 6.6 G/DL (ref 6–8.2)
PROT UR QL STRIP: NEGATIVE MG/DL
PROTHROMBIN TIME: 13.7 SEC (ref 12–14.6)
RBC # BLD AUTO: 4.13 M/UL (ref 4.7–6.1)
RBC # BLD AUTO: 4.27 M/UL (ref 4.7–6.1)
RBC BLD AUTO: PRESENT
RBC UR QL AUTO: NEGATIVE
RH BLD: NORMAL
SODIUM SERPL-SCNC: 136 MMOL/L (ref 135–145)
SODIUM SERPL-SCNC: 139 MMOL/L (ref 135–145)
SP GR UR STRIP.AUTO: 1.01
UFH PPP CHRO-ACNC: 0.24 IU/ML
UROBILINOGEN UR STRIP.AUTO-MCNC: 1 MG/DL
VARIANT LYMPHS BLD QL SMEAR: NORMAL
WBC # BLD AUTO: 10.8 K/UL (ref 4.8–10.8)
WBC # BLD AUTO: 11.4 K/UL (ref 4.8–10.8)

## 2024-03-10 PROCEDURE — 99232 SBSQ HOSP IP/OBS MODERATE 35: CPT | Mod: FS | Performed by: INTERNAL MEDICINE

## 2024-03-10 PROCEDURE — 85007 BL SMEAR W/DIFF WBC COUNT: CPT

## 2024-03-10 PROCEDURE — 86901 BLOOD TYPING SEROLOGIC RH(D): CPT

## 2024-03-10 PROCEDURE — 770022 HCHG ROOM/CARE - ICU (200)

## 2024-03-10 PROCEDURE — 93010 ELECTROCARDIOGRAM REPORT: CPT | Performed by: INTERNAL MEDICINE

## 2024-03-10 PROCEDURE — 93005 ELECTROCARDIOGRAM TRACING: CPT | Performed by: NURSE PRACTITIONER

## 2024-03-10 PROCEDURE — 700102 HCHG RX REV CODE 250 W/ 637 OVERRIDE(OP): Performed by: INTERNAL MEDICINE

## 2024-03-10 PROCEDURE — 700102 HCHG RX REV CODE 250 W/ 637 OVERRIDE(OP): Performed by: NURSE PRACTITIONER

## 2024-03-10 PROCEDURE — 85520 HEPARIN ASSAY: CPT

## 2024-03-10 PROCEDURE — 86850 RBC ANTIBODY SCREEN: CPT

## 2024-03-10 PROCEDURE — 99232 SBSQ HOSP IP/OBS MODERATE 35: CPT | Performed by: STUDENT IN AN ORGANIZED HEALTH CARE EDUCATION/TRAINING PROGRAM

## 2024-03-10 PROCEDURE — 80053 COMPREHEN METABOLIC PANEL: CPT

## 2024-03-10 PROCEDURE — A9270 NON-COVERED ITEM OR SERVICE: HCPCS | Performed by: NURSE PRACTITIONER

## 2024-03-10 PROCEDURE — 85730 THROMBOPLASTIN TIME PARTIAL: CPT

## 2024-03-10 PROCEDURE — 85610 PROTHROMBIN TIME: CPT

## 2024-03-10 PROCEDURE — 86900 BLOOD TYPING SEROLOGIC ABO: CPT

## 2024-03-10 PROCEDURE — 700111 HCHG RX REV CODE 636 W/ 250 OVERRIDE (IP): Performed by: EMERGENCY MEDICINE

## 2024-03-10 PROCEDURE — A9270 NON-COVERED ITEM OR SERVICE: HCPCS | Performed by: INTERNAL MEDICINE

## 2024-03-10 PROCEDURE — 85027 COMPLETE CBC AUTOMATED: CPT

## 2024-03-10 PROCEDURE — 71046 X-RAY EXAM CHEST 2 VIEWS: CPT

## 2024-03-10 PROCEDURE — 80048 BASIC METABOLIC PNL TOTAL CA: CPT

## 2024-03-10 PROCEDURE — 83036 HEMOGLOBIN GLYCOSYLATED A1C: CPT

## 2024-03-10 PROCEDURE — 82962 GLUCOSE BLOOD TEST: CPT | Mod: 91

## 2024-03-10 PROCEDURE — 81003 URINALYSIS AUTO W/O SCOPE: CPT

## 2024-03-10 PROCEDURE — 85025 COMPLETE CBC W/AUTO DIFF WBC: CPT

## 2024-03-10 RX ORDER — DEXMEDETOMIDINE HYDROCHLORIDE 4 UG/ML
0-1.5 INJECTION, SOLUTION INTRAVENOUS CONTINUOUS
Status: DISCONTINUED | OUTPATIENT
Start: 2024-03-11 | End: 2024-03-11

## 2024-03-10 RX ORDER — EPINEPHRINE HCL IN 0.9 % NACL 4MG/250ML
0-.5 PLASTIC BAG, INJECTION (ML) INTRAVENOUS CONTINUOUS
Status: DISCONTINUED | OUTPATIENT
Start: 2024-03-11 | End: 2024-03-11

## 2024-03-10 RX ORDER — NOREPINEPHRINE BITARTRATE 0.03 MG/ML
0-1 INJECTION, SOLUTION INTRAVENOUS CONTINUOUS
Status: DISCONTINUED | OUTPATIENT
Start: 2024-03-11 | End: 2024-03-11

## 2024-03-10 RX ORDER — ACETAMINOPHEN 500 MG
1000 TABLET ORAL ONCE
Status: COMPLETED | OUTPATIENT
Start: 2024-03-11 | End: 2024-03-11

## 2024-03-10 RX ADMIN — HEPARIN SODIUM 16 UNITS/KG/HR: 5000 INJECTION, SOLUTION INTRAVENOUS at 07:30

## 2024-03-10 RX ADMIN — AMLODIPINE BESYLATE 5 MG: 10 TABLET ORAL at 05:31

## 2024-03-10 RX ADMIN — TAMSULOSIN HYDROCHLORIDE 0.4 MG: 0.4 CAPSULE ORAL at 05:32

## 2024-03-10 RX ADMIN — LEVOTHYROXINE SODIUM 125 MCG: 0.12 TABLET ORAL at 05:31

## 2024-03-10 RX ADMIN — METOPROLOL TARTRATE 25 MG: 25 TABLET, FILM COATED ORAL at 16:12

## 2024-03-10 RX ADMIN — METOPROLOL TARTRATE 25 MG: 25 TABLET, FILM COATED ORAL at 05:31

## 2024-03-10 RX ADMIN — ASPIRIN 81 MG 81 MG: 81 TABLET ORAL at 05:31

## 2024-03-10 RX ADMIN — ATORVASTATIN CALCIUM 80 MG: 80 TABLET, FILM COATED ORAL at 16:12

## 2024-03-10 ASSESSMENT — FIBROSIS 4 INDEX: FIB4 SCORE: 1.74

## 2024-03-10 ASSESSMENT — ENCOUNTER SYMPTOMS
CHOKING: 0
APNEA: 0
COUGH: 0
CHEST TIGHTNESS: 0
WHEEZING: 0
STRIDOR: 0
SHORTNESS OF BREATH: 0

## 2024-03-10 ASSESSMENT — PAIN DESCRIPTION - PAIN TYPE: TYPE: ACUTE PAIN

## 2024-03-10 NOTE — CARE PLAN
The patient is Stable - Low risk of patient condition declining or worsening    Shift Goals  Clinical Goals: hep gtt  Patient Goals: rest    Progress made toward(s) clinical / shift goals:  Pt still on hep gtt. Cts consulted. Surgery scheduled for 3/11.  Problem: Knowledge Deficit - Standard  Goal: Patient and family/care givers will demonstrate understanding of plan of care, disease process/condition, diagnostic tests and medications  Outcome: Progressing     Problem: Pain - Standard  Goal: Alleviation of pain or a reduction in pain to the patient’s comfort goal  Outcome: Progressing       Patient is not progressing towards the following goals:

## 2024-03-10 NOTE — PROGRESS NOTES
Bedside report received from off going RN/tech: Jorge, assumed care of patient. POC updated. Pt denies pain or any other needs at this time.     Fall Risk Score: LOW RISK  Fall risk interventions in place: Place yellow fall risk ID band on patient, Provide patient/family education based on risk assessment, Educate patient/family to call staff for assistance when getting out of bed, Place fall precaution signage outside patient door, Place patient in room close to nursing station, and Utilize bed/chair fall alarm  Bed type: Regular (Rene Score less than 17 interventions in place)  Patient on cardiac monitor: Yes  IVF/IV medications: Heparin   Oxygen: Room Air  Bedside sitter: Not Applicable   Isolation: Not applicable

## 2024-03-10 NOTE — PROGRESS NOTES
Cardiology Follow Up Progress Note    Date of Service  3/10/2024    Attending Physician  Kehinde Whitaker M.D.    Chief Complaint   NSTEMI      HPI  Julian Christine is a 63 y.o. male admitted 3/7/2024 with chest pain found NSTEMI.    PMH: Type 2 diabetes, tobacco use, hypertension, dyslipidemia.      Interim Events  No overnight cardiac events  Telemetry-SR  SBP 120s  CABG is planned for tomorrow 3/11/2024.  On IV heparin drip.  Review of Systems  Review of Systems   Respiratory:  Negative for apnea, cough, choking, chest tightness, shortness of breath, wheezing and stridor.        Vital signs in last 24 hours  Temp:  [36.7 °C (98.1 °F)-37.2 °C (99 °F)] 36.7 °C (98.1 °F)  Pulse:  [61-73] 65  Resp:  [16-18] 16  BP: (102-121)/(60-70) 121/70  SpO2:  [93 %-95 %] 94 %    Physical Exam  Physical Exam  Cardiovascular:      Rate and Rhythm: Normal rate and regular rhythm.      Pulses: Normal pulses.   Pulmonary:      Effort: Pulmonary effort is normal.   Skin:     General: Skin is warm.   Neurological:      Mental Status: He is alert. Mental status is at baseline.   Psychiatric:         Mood and Affect: Mood normal.         Lab Review  Lab Results   Component Value Date/Time    WBC 10.8 03/10/2024 08:28 AM    RBC 4.27 (L) 03/10/2024 08:28 AM    HEMOGLOBIN 14.8 03/10/2024 08:28 AM    HEMATOCRIT 42.6 03/10/2024 08:28 AM    MCV 99.8 (H) 03/10/2024 08:28 AM    MCH 34.7 (H) 03/10/2024 08:28 AM    MCHC 34.7 03/10/2024 08:28 AM    MPV 11.0 03/10/2024 08:28 AM      Lab Results   Component Value Date/Time    SODIUM 136 03/10/2024 08:28 AM    POTASSIUM 4.0 03/10/2024 08:28 AM    CHLORIDE 103 03/10/2024 08:28 AM    CO2 21 03/10/2024 08:28 AM    GLUCOSE 119 (H) 03/10/2024 08:28 AM    BUN 11 03/10/2024 08:28 AM    CREATININE 0.77 03/10/2024 08:28 AM      Lab Results   Component Value Date/Time    ASTSGOT 29 03/10/2024 08:28 AM    ALTSGPT 29 03/10/2024 08:28 AM     Lab Results   Component Value Date/Time    CHOLSTRLTOT 107 03/08/2024 03:56  "AM    LDL 36 03/08/2024 03:56 AM    HDL 57 03/08/2024 03:56 AM    TRIGLYCERIDE 72 03/08/2024 03:56 AM    TROPONINT 689 (H) 03/08/2024 03:56 AM       No results for input(s): \"NTPROBNP\" in the last 72 hours.    Cardiac Imaging and Procedures Review  EKG:      Echocardiogram:    No prior study is available for comparison.   The left ventricle is normal in size and thickness.  The left ventricular ejection fraction is visually estimated to be 70%.  Reduced right ventricular systolic function.  Mild mitral regurgitation.  Normal inferior vena cava size and inspiratory collapse.    Cardiac Catheterization:      IMPRESSION:  Severe obstructive three-vessel coronary artery disease involving the proximal right coronary artery, the small mid left circumflex coronary artery, the large second diagonal branch, and the chronically occluded mid left anterior descending coronary.  Normal left heart filling pressures.     RECOMMENDATIONS:  Return to floor with continued care per primary service.  TR band release per protocol.  Restart heparin drip in 2 hours at previous rate if no access site bleeding complications.  Evaluation by Cardiothoracic Surgery for coronary artery bypass grafting with consideration for complex percutaneous coronary intervention if the patient is not a surgical candidate.       Imaging  Chest X-Ray:        No evidence of acute cardiopulmonary process.          Assessment/Plan    # NSTEMI  # MVCAD  # Type 2 diabetes  # Hypertension  # Dyslipidemia  # History of splenectomy  # History of Hodgkin's lymphoma  # Former smoker      -Continue telemetry monitoring.  -Patient is scheduled for CABG Monday, 3/11/2024.  -IV heparin drip  -N.p.o. at midnight  -Aspirin 81, atorvastatin 80 and Metroprolol 25 BID  -Amlodipine for BP management    I personally spent a total of 15 minutes which includes face-to-face time and non-face-to-face time spent on preparing to see the patient, reviewing hospital notes and tests, " obtaining history from the patient, performing a medically appropriate exam, counseling and educating the patient, ordering medications/tests/procedures/referrals as clinically indicated, and documenting information in the electronic medical record.       Thank you for allowing me to participate in the care of this patient.  Cardiology will sign off on this patient    Please contact me with any questions.    LUIZ Aguayo.   Cardiologist, [facility]  [phone]

## 2024-03-10 NOTE — CARE PLAN
The patient is Stable - Low risk of patient condition declining or worsening    Shift Goals  Clinical Goals: hep gtt  Patient Goals: rest    Progress made toward(s) clinical / shift goals:  Progressing      Problem: Knowledge Deficit - Standard  Goal: Patient and family/care givers will demonstrate understanding of plan of care, disease process/condition, diagnostic tests and medications  Outcome: Progressing  Note: Pt verbalizes understanding of plan of care     Problem: Pain - Standard  Goal: Alleviation of pain or a reduction in pain to the patient’s comfort goal  Outcome: Progressing  Note: Pt declines pain at this time

## 2024-03-10 NOTE — PROGRESS NOTES
Hospital Medicine Daily Progress Note    Date of Service  3/10/2024    Chief Complaint  Julian Christine is a 63 y.o. male admitted 3/7/2024 with NSTEMI    Hospital Course  60 male with past medical history of hyperlipidemia, type 2 diabetes mellitus, hypertension referred from outside facility for NSTEMI.  Noted to have mildly elevated troponin.  Initiated on heparin drip for ACS.  Echocardiogram with ejection fraction 70%, mild mitral regurgitation.  S/p cardiac catheterization which revealed multivessel disease.  CTS evaluated and scheduled for CABG on 3/11/2024.      Interval Problem Update    3/10/2024  Patient seen and examined at bedside  Vitals remained stable  Remain asymptomatic  No event on telemetry  Labs reviewed normal white count, normal renal function, A1c 6.1    Discussed  with cardiology Dr. Sal.  Scheduled for CABG on Monday.  Patient to be transferred to ICU today      Telemetry monitoring  Continue on heparin drip, monitor for bleeding   Continue on aspirin, Lipitor, lisinopril, metoprolol  Repeat BMP in a.m. to monitor electrolytes and  renal function   Repeat CBC in a.m. to monitor white count and hemoglobin while on iv heparin drip  Scheduled for CABG on 3/11/2024  High risk of deterioration into arrhythmias , need close telemetry monitoring for ACS        I have discussed this patient's plan of care and discharge plan at IDT rounds today with Case Management, Nursing, Nursing leadership, and other members of the IDT team.    Consultants/Specialty  cardiology    Code Status  Full Code    Disposition  The patient is not medically cleared for discharge to home or a post-acute facility.  Anticipate discharge to: home with close outpatient follow-up    I have placed the appropriate orders for post-discharge needs.    Review of Systems  Review of Systems   Respiratory:  Negative for shortness of breath.    Cardiovascular:  Negative for chest pain.        Physical Exam  Temp:  [36.7 °C (98.1  °F)-37.2 °C (99 °F)] 36.7 °C (98.1 °F)  Pulse:  [61-73] 65  Resp:  [16-18] 16  BP: (102-121)/(60-70) 121/70  SpO2:  [93 %-95 %] 94 %    Physical Exam  Constitutional:       Appearance: Normal appearance.   Cardiovascular:      Rate and Rhythm: Normal rate and regular rhythm.      Pulses: Normal pulses.      Heart sounds: Normal heart sounds.   Pulmonary:      Effort: Pulmonary effort is normal. No respiratory distress.      Breath sounds: Normal breath sounds.   Musculoskeletal:      Right lower leg: No edema.      Left lower leg: No edema.   Skin:     General: Skin is warm.      Capillary Refill: Capillary refill takes less than 2 seconds.   Neurological:      General: No focal deficit present.      Mental Status: He is alert and oriented to person, place, and time.   Psychiatric:         Mood and Affect: Mood normal.         Fluids  No intake or output data in the 24 hours ending 03/10/24 1443      Laboratory  Recent Labs     03/09/24  0546 03/10/24  0355 03/10/24  0828   WBC 12.6* 11.4* 10.8   RBC 4.32* 4.13* 4.27*   HEMOGLOBIN 14.9 14.5 14.8   HEMATOCRIT 43.0 40.9* 42.6   MCV 99.5* 99.0* 99.8*   MCH 34.5* 35.1* 34.7*   MCHC 34.7 35.5 34.7   RDW 50.1* 48.8 49.3   PLATELETCT 248 250 247   MPV 10.8 10.7 11.0     Recent Labs     03/09/24  0546 03/10/24  0355 03/10/24  0828   SODIUM 135 139 136   POTASSIUM 4.0 4.0 4.0   CHLORIDE 103 104 103   CO2 18* 21 21   GLUCOSE 111* 101* 119*   BUN 11 11 11   CREATININE 0.72 0.89 0.77   CALCIUM 8.6 8.9 9.0     Recent Labs     03/10/24  0828   APTT 83.1*   INR 1.04         Recent Labs     03/08/24  0356   TRIGLYCERIDE 72   HDL 57   LDL 36       Imaging  DX-CHEST-2 VIEWS   Final Result      No evidence of acute cardiopulmonary process.      US-VEIN MAPPING LOWER EXTREMITY BILAT   Final Result      US-CAROTID DOPPLER BILAT   Final Result      EC-ECHOCARDIOGRAM COMPLETE W/ CONT   Final Result      DX-CHEST-PORTABLE (1 VIEW)   Final Result      No acute cardiopulmonary abnormality.       CL-LEFT HEART CATHETERIZATION WITH POSSIBLE INTERVENTION    (Results Pending)        Assessment/Plan  * ACS (acute coronary syndrome) (HCC)- (present on admission)  Assessment & Plan  S/p cardiac catheterization which revealed multivessel disease.  CTS evaluated and scheduled for CABG on 3/11/2024.    Telemetry monitoring  Continue on heparin drip, monitor for bleeding   Continue on aspirin, Lipitor, lisinopril, metoprolol  Repeat BMP in a.m. to monitor electrolytes and  renal function   Repeat CBC in a.m. to monitor white count and hemoglobin while on iv heparin drip  High risk of deterioration into arrhythmias , need close telemetry monitoring for ACS      H/O splenectomy- (present on admission)  Assessment & Plan  Hx of    Hypothyroid- (present on admission)  Assessment & Plan  Continue home Synthroid    Type 2 diabetes mellitus, without long-term current use of insulin (HCC)- (present on admission)  Assessment & Plan  Not on insulin at home  Will start insulin sliding scale, Accu-Cheks and hypoglycemia protocol      Hyperlipidemia- (present on admission)  Assessment & Plan  Patient on 10 mg atorvastatin at home.  Increasing to 80 mg.  Checking lipid panel    History of Hodgkin's lymphoma- (present on admission)  Assessment & Plan  Radiation treatment 1985, no chemo    Primary hypertension- (present on admission)  Assessment & Plan  Patient reports having controlled blood pressure at home.  We will continue lisinopril, increasing to 20 mg.         VTE prophylaxis: IV heparin drip     I have performed a physical exam and reviewed and updated ROS and Plan today (3/10/2024). In review of yesterday's note (3/9/2024), there are no changes except as documented above.

## 2024-03-10 NOTE — PROGRESS NOTES
Bedside report received from off going RN/tech: Ursula, assumed care of patient.     Fall Risk Score: LOW RISK  Fall risk interventions in place: Place yellow fall risk ID band on patient, Provide patient/family education based on risk assessment, Educate patient/family to call staff for assistance when getting out of bed, and Place fall precaution signage outside patient door  Bed type: Regular (Rene Score less than 17 interventions in place)  Patient on cardiac monitor: Yes  IVF/IV medications: Infusion per MAR (List Med(s)) Heparin GTT  Oxygen: Room Air  Bedside sitter: Not Applicable   Isolation: Not applicable

## 2024-03-11 ENCOUNTER — ANESTHESIA EVENT (OUTPATIENT)
Dept: SURGERY | Facility: MEDICAL CENTER | Age: 64
DRG: 232 | End: 2024-03-11
Payer: COMMERCIAL

## 2024-03-11 ENCOUNTER — ANESTHESIA (OUTPATIENT)
Dept: SURGERY | Facility: MEDICAL CENTER | Age: 64
DRG: 232 | End: 2024-03-11
Payer: COMMERCIAL

## 2024-03-11 ENCOUNTER — APPOINTMENT (OUTPATIENT)
Dept: RADIOLOGY | Facility: MEDICAL CENTER | Age: 64
DRG: 232 | End: 2024-03-11
Attending: THORACIC SURGERY (CARDIOTHORACIC VASCULAR SURGERY)
Payer: COMMERCIAL

## 2024-03-11 ENCOUNTER — APPOINTMENT (OUTPATIENT)
Dept: CARDIOLOGY | Facility: MEDICAL CENTER | Age: 64
DRG: 232 | End: 2024-03-11
Attending: ANESTHESIOLOGY
Payer: COMMERCIAL

## 2024-03-11 PROBLEM — Z99.11 ENCOUNTER FOR WEANING FROM VENTILATOR (HCC): Status: ACTIVE | Noted: 2024-03-11

## 2024-03-11 PROBLEM — Z95.1 S/P CABG X 3: Status: ACTIVE | Noted: 2024-03-11

## 2024-03-11 LAB
ACT BLD: 147 SEC (ref 74–137)
ACT BLD: 152 SEC (ref 74–137)
ACT BLD: 520 SEC (ref 74–137)
ACT BLD: 526 SEC (ref 74–137)
ACT BLD: 612 SEC (ref 74–137)
ACT BLD: 672 SEC (ref 74–137)
ANION GAP SERPL CALC-SCNC: 11 MMOL/L (ref 7–16)
APTT PPP: 30.5 SEC (ref 24.7–36)
ARTERIAL PATENCY WRIST A: ABNORMAL
BASE EXCESS BLDA CALC-SCNC: -1 MMOL/L (ref -4–3)
BASE EXCESS BLDA CALC-SCNC: -4 MMOL/L (ref -4–3)
BASE EXCESS BLDA CALC-SCNC: -5 MMOL/L (ref -4–3)
BASE EXCESS BLDA CALC-SCNC: 0 MMOL/L (ref -4–3)
BASE EXCESS BLDA CALC-SCNC: 0 MMOL/L (ref -4–3)
BASE EXCESS BLDA CALC-SCNC: 1 MMOL/L (ref -4–3)
BASE EXCESS BLDA CALC-SCNC: 2 MMOL/L (ref -4–3)
BASE EXCESS BLDV CALC-SCNC: 0 MMOL/L (ref -4–3)
BASE EXCESS BLDV CALC-SCNC: 4 MMOL/L (ref -4–3)
BASOPHILS # BLD AUTO: 1.1 % (ref 0–1.8)
BASOPHILS # BLD: 0.12 K/UL (ref 0–0.12)
BODY TEMPERATURE: ABNORMAL DEGREES
BUN SERPL-MCNC: 13 MG/DL (ref 8–22)
CA-I BLD ISE-SCNC: 0.97 MMOL/L (ref 1.1–1.3)
CA-I BLD ISE-SCNC: 0.99 MMOL/L (ref 1.1–1.3)
CA-I BLD ISE-SCNC: 1.02 MMOL/L (ref 1.1–1.3)
CA-I BLD ISE-SCNC: 1.04 MMOL/L (ref 1.1–1.3)
CA-I BLD ISE-SCNC: 1.19 MMOL/L (ref 1.1–1.3)
CA-I BLD ISE-SCNC: 1.21 MMOL/L (ref 1.1–1.3)
CA-I BLD ISE-SCNC: 1.23 MMOL/L (ref 1.1–1.3)
CA-I BLD ISE-SCNC: 1.28 MMOL/L (ref 1.1–1.3)
CALCIUM SERPL-MCNC: 8.7 MG/DL (ref 8.5–10.5)
CHLORIDE SERPL-SCNC: 103 MMOL/L (ref 96–112)
CO2 BLDA-SCNC: 21 MMOL/L (ref 20–33)
CO2 BLDA-SCNC: 22 MMOL/L (ref 20–33)
CO2 BLDA-SCNC: 23 MMOL/L (ref 20–33)
CO2 BLDA-SCNC: 23 MMOL/L (ref 20–33)
CO2 BLDA-SCNC: 25 MMOL/L (ref 20–33)
CO2 BLDA-SCNC: 26 MMOL/L (ref 20–33)
CO2 BLDA-SCNC: 28 MMOL/L (ref 20–33)
CO2 BLDV-SCNC: 26 MMOL/L (ref 20–33)
CO2 BLDV-SCNC: 30 MMOL/L (ref 20–33)
CO2 SERPL-SCNC: 21 MMOL/L (ref 20–33)
CREAT SERPL-MCNC: 0.82 MG/DL (ref 0.5–1.4)
DELSYS IDSYS: ABNORMAL
EKG IMPRESSION: NORMAL
END TIDAL CARBON DIOXIDE IECO2: 21 MMHG
END TIDAL CARBON DIOXIDE IECO2: 23 MMHG
END TIDAL CARBON DIOXIDE IECO2: 25 MMHG
END TIDAL CARBON DIOXIDE IECO2: 27 MMHG
EOSINOPHIL # BLD AUTO: 0.46 K/UL (ref 0–0.51)
EOSINOPHIL NFR BLD: 4.2 % (ref 0–6.9)
ERYTHROCYTE [DISTWIDTH] IN BLOOD BY AUTOMATED COUNT: 49.2 FL (ref 35.9–50)
GFR SERPLBLD CREATININE-BSD FMLA CKD-EPI: 98 ML/MIN/1.73 M 2
GLUCOSE BLD STRIP.AUTO-MCNC: 100 MG/DL (ref 65–99)
GLUCOSE BLD STRIP.AUTO-MCNC: 103 MG/DL (ref 65–99)
GLUCOSE BLD STRIP.AUTO-MCNC: 115 MG/DL (ref 65–99)
GLUCOSE BLD STRIP.AUTO-MCNC: 127 MG/DL (ref 65–99)
GLUCOSE BLD STRIP.AUTO-MCNC: 130 MG/DL (ref 65–99)
GLUCOSE BLD STRIP.AUTO-MCNC: 132 MG/DL (ref 65–99)
GLUCOSE BLD STRIP.AUTO-MCNC: 142 MG/DL (ref 65–99)
GLUCOSE BLD STRIP.AUTO-MCNC: 149 MG/DL (ref 65–99)
GLUCOSE BLD STRIP.AUTO-MCNC: 152 MG/DL (ref 65–99)
GLUCOSE BLD STRIP.AUTO-MCNC: 160 MG/DL (ref 65–99)
GLUCOSE BLD STRIP.AUTO-MCNC: 162 MG/DL (ref 65–99)
GLUCOSE BLD STRIP.AUTO-MCNC: 184 MG/DL (ref 65–99)
GLUCOSE BLD STRIP.AUTO-MCNC: 201 MG/DL (ref 65–99)
GLUCOSE BLD STRIP.AUTO-MCNC: 216 MG/DL (ref 65–99)
GLUCOSE BLD STRIP.AUTO-MCNC: 95 MG/DL (ref 65–99)
GLUCOSE SERPL-MCNC: 172 MG/DL (ref 65–99)
HCO3 BLDA-SCNC: 20.1 MMOL/L (ref 17–25)
HCO3 BLDA-SCNC: 20.9 MMOL/L (ref 17–25)
HCO3 BLDA-SCNC: 21 MMOL/L (ref 17–25)
HCO3 BLDA-SCNC: 21.2 MMOL/L (ref 17–25)
HCO3 BLDA-SCNC: 21.7 MMOL/L (ref 17–25)
HCO3 BLDA-SCNC: 22.6 MMOL/L (ref 17–25)
HCO3 BLDA-SCNC: 23.9 MMOL/L (ref 17–25)
HCO3 BLDA-SCNC: 24.7 MMOL/L (ref 17–25)
HCO3 BLDA-SCNC: 26.9 MMOL/L (ref 17–25)
HCO3 BLDV-SCNC: 25.1 MMOL/L (ref 24–28)
HCO3 BLDV-SCNC: 29.2 MMOL/L (ref 24–28)
HCT VFR BLD AUTO: 38 % (ref 42–52)
HCT VFR BLD AUTO: 40 % (ref 42–52)
HCT VFR BLD CALC: 28 % (ref 42–52)
HCT VFR BLD CALC: 29 % (ref 42–52)
HCT VFR BLD CALC: 29 % (ref 42–52)
HCT VFR BLD CALC: 31 % (ref 42–52)
HCT VFR BLD CALC: 38 % (ref 42–52)
HCT VFR BLD CALC: 40 % (ref 42–52)
HCT VFR BLD CALC: 42 % (ref 42–52)
HGB BLD-MCNC: 10.5 G/DL (ref 14–18)
HGB BLD-MCNC: 12.9 G/DL (ref 14–18)
HGB BLD-MCNC: 13.6 G/DL (ref 14–18)
HGB BLD-MCNC: 13.6 G/DL (ref 14–18)
HGB BLD-MCNC: 13.7 G/DL (ref 14–18)
HGB BLD-MCNC: 14.3 G/DL (ref 14–18)
HGB BLD-MCNC: 9.5 G/DL (ref 14–18)
HGB BLD-MCNC: 9.9 G/DL (ref 14–18)
HGB BLD-MCNC: 9.9 G/DL (ref 14–18)
HOROWITZ INDEX BLDA+IHG-RTO: 134 MM[HG]
HOROWITZ INDEX BLDA+IHG-RTO: 189 MM[HG]
HOROWITZ INDEX BLDA+IHG-RTO: 270 MM[HG]
HOROWITZ INDEX BLDA+IHG-RTO: 277 MM[HG]
IMM GRANULOCYTES # BLD AUTO: 0.05 K/UL (ref 0–0.11)
IMM GRANULOCYTES NFR BLD AUTO: 0.5 % (ref 0–0.9)
INR PPP: 1.28 (ref 0.87–1.13)
LACTATE BLD-SCNC: 1.6 MMOL/L (ref 0.5–2)
LACTATE BLD-SCNC: 1.7 MMOL/L (ref 0.5–2)
LACTATE BLD-SCNC: 1.8 MMOL/L (ref 0.5–2)
LYMPHOCYTES # BLD AUTO: 4.62 K/UL (ref 1–4.8)
LYMPHOCYTES NFR BLD: 42.6 % (ref 22–41)
MAGNESIUM SERPL-MCNC: 3.2 MG/DL (ref 1.5–2.5)
MCH RBC QN AUTO: 34.4 PG (ref 27–33)
MCHC RBC AUTO-ENTMCNC: 34.3 G/DL (ref 32.3–36.5)
MCV RBC AUTO: 100.5 FL (ref 81.4–97.8)
MODE IMODE: ABNORMAL
MONOCYTES # BLD AUTO: 1.17 K/UL (ref 0–0.85)
MONOCYTES NFR BLD AUTO: 10.8 % (ref 0–13.4)
NEUTROPHILS # BLD AUTO: 4.43 K/UL (ref 1.82–7.42)
NEUTROPHILS NFR BLD: 40.8 % (ref 44–72)
NRBC # BLD AUTO: 0 K/UL
NRBC BLD-RTO: 0 /100 WBC (ref 0–0.2)
O2/TOTAL GAS SETTING VFR VENT: 100 %
O2/TOTAL GAS SETTING VFR VENT: 30 %
O2/TOTAL GAS SETTING VFR VENT: 50 %
O2/TOTAL GAS SETTING VFR VENT: 70 %
PCO2 BLDA: 25 MMHG (ref 26–37)
PCO2 BLDA: 25.8 MMHG (ref 26–37)
PCO2 BLDA: 29.2 MMHG (ref 26–37)
PCO2 BLDA: 30.5 MMHG (ref 26–37)
PCO2 BLDA: 34.4 MMHG (ref 26–37)
PCO2 BLDA: 37.4 MMHG (ref 26–37)
PCO2 BLDA: 37.7 MMHG (ref 26–37)
PCO2 BLDA: 43.5 MMHG (ref 26–37)
PCO2 BLDA: 45.5 MMHG (ref 26–37)
PCO2 BLDV: 42.2 MMHG (ref 41–51)
PCO2 BLDV: 44.2 MMHG (ref 41–51)
PCO2 TEMP ADJ BLDA: 25.2 MMHG (ref 26–37)
PCO2 TEMP ADJ BLDA: 25.3 MMHG (ref 26–37)
PCO2 TEMP ADJ BLDA: 29.2 MMHG (ref 26–37)
PCO2 TEMP ADJ BLDA: 29.8 MMHG (ref 26–37)
PCO2 TEMP ADJ BLDA: 30.2 MMHG (ref 26–37)
PCO2 TEMP ADJ BLDA: 36.4 MMHG (ref 26–37)
PCO2 TEMP ADJ BLDA: 36.9 MMHG (ref 26–37)
PCO2 TEMP ADJ BLDA: 42 MMHG (ref 26–37)
PCO2 TEMP ADJ BLDA: 42.2 MMHG (ref 26–37)
PCO2 TEMP ADJ BLDV: 39.1 MMHG (ref 41–51)
PCO2 TEMP ADJ BLDV: 40.4 MMHG (ref 41–51)
PEEP END EXPIRATORY PRESSURE IPEEP: 8 CMH20
PERCENT MINUTE VOLUME IPMV: 120
PERCENT MINUTE VOLUME IPMV: 150
PERCENT MINUTE VOLUME IPMV: 160
PERCENT MINUTE VOLUME IPMV: 90
PH BLDA: 7.29 [PH] (ref 7.4–7.5)
PH BLDA: 7.35 [PH] (ref 7.4–7.5)
PH BLDA: 7.4 [PH] (ref 7.4–7.5)
PH BLDA: 7.41 [PH] (ref 7.4–7.5)
PH BLDA: 7.46 [PH] (ref 7.4–7.5)
PH BLDA: 7.47 [PH] (ref 7.4–7.5)
PH BLDA: 7.48 [PH] (ref 7.4–7.5)
PH BLDA: 7.51 [PH] (ref 7.4–7.5)
PH BLDA: 7.52 [PH] (ref 7.4–7.5)
PH BLDV: 7.38 [PH] (ref 7.31–7.45)
PH BLDV: 7.43 [PH] (ref 7.31–7.45)
PH TEMP ADJ BLDA: 7.31 [PH] (ref 7.4–7.5)
PH TEMP ADJ BLDA: 7.36 [PH] (ref 7.4–7.5)
PH TEMP ADJ BLDA: 7.41 [PH] (ref 7.4–7.5)
PH TEMP ADJ BLDA: 7.42 [PH] (ref 7.4–7.5)
PH TEMP ADJ BLDA: 7.47 [PH] (ref 7.4–7.5)
PH TEMP ADJ BLDA: 7.49 [PH] (ref 7.4–7.5)
PH TEMP ADJ BLDA: 7.51 [PH] (ref 7.4–7.5)
PH TEMP ADJ BLDA: 7.51 [PH] (ref 7.4–7.5)
PH TEMP ADJ BLDA: 7.53 [PH] (ref 7.4–7.5)
PH TEMP ADJ BLDV: 7.4 [PH] (ref 7.31–7.45)
PH TEMP ADJ BLDV: 7.47 [PH] (ref 7.31–7.45)
PLATELET # BLD AUTO: 126 K/UL (ref 164–446)
PLATELET # BLD AUTO: 228 K/UL (ref 164–446)
PMV BLD AUTO: 11.3 FL (ref 9–12.9)
PO2 BLDA: 135 MMHG (ref 64–87)
PO2 BLDA: 189 MMHG (ref 64–87)
PO2 BLDA: 248 MMHG (ref 64–87)
PO2 BLDA: 313 MMHG (ref 64–87)
PO2 BLDA: 376 MMHG (ref 64–87)
PO2 BLDA: 398 MMHG (ref 64–87)
PO2 BLDA: 458 MMHG (ref 64–87)
PO2 BLDA: 83 MMHG (ref 64–87)
PO2 BLDA: 94 MMHG (ref 64–87)
PO2 BLDV: 49 MMHG (ref 25–40)
PO2 BLDV: 49 MMHG (ref 25–40)
PO2 TEMP ADJ BLDA: 135 MMHG (ref 64–87)
PO2 TEMP ADJ BLDA: 187 MMHG (ref 64–87)
PO2 TEMP ADJ BLDA: 247 MMHG (ref 64–87)
PO2 TEMP ADJ BLDA: 309 MMHG (ref 64–87)
PO2 TEMP ADJ BLDA: 372 MMHG (ref 64–87)
PO2 TEMP ADJ BLDA: 389 MMHG (ref 64–87)
PO2 TEMP ADJ BLDA: 440 MMHG (ref 64–87)
PO2 TEMP ADJ BLDA: 84 MMHG (ref 64–87)
PO2 TEMP ADJ BLDA: 92 MMHG (ref 64–87)
PO2 TEMP ADJ BLDV: 40 MMHG (ref 25–40)
PO2 TEMP ADJ BLDV: 46 MMHG (ref 25–40)
POTASSIUM BLD-SCNC: 3.6 MMOL/L (ref 3.6–5.5)
POTASSIUM BLD-SCNC: 3.7 MMOL/L (ref 3.6–5.5)
POTASSIUM BLD-SCNC: 3.9 MMOL/L (ref 3.6–5.5)
POTASSIUM BLD-SCNC: 4 MMOL/L (ref 3.6–5.5)
POTASSIUM BLD-SCNC: 4.5 MMOL/L (ref 3.6–5.5)
POTASSIUM BLD-SCNC: 4.5 MMOL/L (ref 3.6–5.5)
POTASSIUM BLD-SCNC: 4.8 MMOL/L (ref 3.6–5.5)
POTASSIUM BLD-SCNC: 5 MMOL/L (ref 3.6–5.5)
POTASSIUM SERPL-SCNC: 3.7 MMOL/L (ref 3.6–5.5)
POTASSIUM SERPL-SCNC: 4 MMOL/L (ref 3.6–5.5)
POTASSIUM SERPL-SCNC: 4.2 MMOL/L (ref 3.6–5.5)
PROTHROMBIN TIME: 16.2 SEC (ref 12–14.6)
RBC # BLD AUTO: 3.98 M/UL (ref 4.7–6.1)
SAO2 % BLDA: 100 % (ref 93–99)
SAO2 % BLDA: 97 % (ref 93–99)
SAO2 % BLDA: 98 % (ref 93–99)
SAO2 % BLDA: 99 % (ref 93–99)
SAO2 % BLDV: 84 %
SAO2 % BLDV: 85 %
SODIUM BLD-SCNC: 136 MMOL/L (ref 135–145)
SODIUM BLD-SCNC: 136 MMOL/L (ref 135–145)
SODIUM BLD-SCNC: 137 MMOL/L (ref 135–145)
SODIUM BLD-SCNC: 138 MMOL/L (ref 135–145)
SODIUM SERPL-SCNC: 135 MMOL/L (ref 135–145)
SPECIMEN DRAWN FROM PATIENT: ABNORMAL
WBC # BLD AUTO: 10.9 K/UL (ref 4.8–10.8)

## 2024-03-11 PROCEDURE — 71045 X-RAY EXAM CHEST 1 VIEW: CPT

## 2024-03-11 PROCEDURE — A9270 NON-COVERED ITEM OR SERVICE: HCPCS | Performed by: NURSE PRACTITIONER

## 2024-03-11 PROCEDURE — 83735 ASSAY OF MAGNESIUM: CPT

## 2024-03-11 PROCEDURE — 33508 ENDOSCOPIC VEIN HARVEST: CPT | Mod: AS,59 | Performed by: NURSE PRACTITIONER

## 2024-03-11 PROCEDURE — C1898 LEAD, PMKR, OTHER THAN TRANS: HCPCS | Performed by: THORACIC SURGERY (CARDIOTHORACIC VASCULAR SURGERY)

## 2024-03-11 PROCEDURE — 83605 ASSAY OF LACTIC ACID: CPT

## 2024-03-11 PROCEDURE — 700111 HCHG RX REV CODE 636 W/ 250 OVERRIDE (IP): Performed by: ANESTHESIOLOGY

## 2024-03-11 PROCEDURE — 82962 GLUCOSE BLOOD TEST: CPT | Mod: 91

## 2024-03-11 PROCEDURE — 700101 HCHG RX REV CODE 250: Performed by: NURSE PRACTITIONER

## 2024-03-11 PROCEDURE — 93005 ELECTROCARDIOGRAM TRACING: CPT | Performed by: NURSE PRACTITIONER

## 2024-03-11 PROCEDURE — 110371 HCHG SHELL REV 272: Performed by: THORACIC SURGERY (CARDIOTHORACIC VASCULAR SURGERY)

## 2024-03-11 PROCEDURE — 700105 HCHG RX REV CODE 258: Performed by: ANESTHESIOLOGY

## 2024-03-11 PROCEDURE — 02100Z9 BYPASS CORONARY ARTERY, ONE ARTERY FROM LEFT INTERNAL MAMMARY, OPEN APPROACH: ICD-10-PCS | Performed by: THORACIC SURGERY (CARDIOTHORACIC VASCULAR SURGERY)

## 2024-03-11 PROCEDURE — 85347 COAGULATION TIME ACTIVATED: CPT

## 2024-03-11 PROCEDURE — 93325 DOPPLER ECHO COLOR FLOW MAPG: CPT

## 2024-03-11 PROCEDURE — 700101 HCHG RX REV CODE 250: Performed by: THORACIC SURGERY (CARDIOTHORACIC VASCULAR SURGERY)

## 2024-03-11 PROCEDURE — 700102 HCHG RX REV CODE 250 W/ 637 OVERRIDE(OP): Performed by: NURSE PRACTITIONER

## 2024-03-11 PROCEDURE — 85610 PROTHROMBIN TIME: CPT

## 2024-03-11 PROCEDURE — 700101 HCHG RX REV CODE 250: Performed by: ANESTHESIOLOGY

## 2024-03-11 PROCEDURE — 700111 HCHG RX REV CODE 636 W/ 250 OVERRIDE (IP): Performed by: THORACIC SURGERY (CARDIOTHORACIC VASCULAR SURGERY)

## 2024-03-11 PROCEDURE — 85014 HEMATOCRIT: CPT | Mod: 91

## 2024-03-11 PROCEDURE — 700105 HCHG RX REV CODE 258: Performed by: NURSE PRACTITIONER

## 2024-03-11 PROCEDURE — 5A1221Z PERFORMANCE OF CARDIAC OUTPUT, CONTINUOUS: ICD-10-PCS | Performed by: THORACIC SURGERY (CARDIOTHORACIC VASCULAR SURGERY)

## 2024-03-11 PROCEDURE — 82803 BLOOD GASES ANY COMBINATION: CPT | Mod: 91

## 2024-03-11 PROCEDURE — 93010 ELECTROCARDIOGRAM REPORT: CPT | Performed by: INTERNAL MEDICINE

## 2024-03-11 PROCEDURE — 82330 ASSAY OF CALCIUM: CPT | Mod: 91

## 2024-03-11 PROCEDURE — 84295 ASSAY OF SERUM SODIUM: CPT | Mod: 91

## 2024-03-11 PROCEDURE — 33533 CABG ARTERIAL SINGLE: CPT | Mod: AS | Performed by: NURSE PRACTITIONER

## 2024-03-11 PROCEDURE — 33519 CABG ARTERY-VEIN THREE: CPT | Performed by: THORACIC SURGERY (CARDIOTHORACIC VASCULAR SURGERY)

## 2024-03-11 PROCEDURE — 700105 HCHG RX REV CODE 258

## 2024-03-11 PROCEDURE — 85025 COMPLETE CBC W/AUTO DIFF WBC: CPT

## 2024-03-11 PROCEDURE — 770022 HCHG ROOM/CARE - ICU (200)

## 2024-03-11 PROCEDURE — P9047 ALBUMIN (HUMAN), 25%, 50ML: HCPCS | Mod: JG

## 2024-03-11 PROCEDURE — B24BZZ4 ULTRASONOGRAPHY OF HEART WITH AORTA, TRANSESOPHAGEAL: ICD-10-PCS | Performed by: ANESTHESIOLOGY

## 2024-03-11 PROCEDURE — 700101 HCHG RX REV CODE 250

## 2024-03-11 PROCEDURE — 94150 VITAL CAPACITY TEST: CPT

## 2024-03-11 PROCEDURE — 85049 AUTOMATED PLATELET COUNT: CPT

## 2024-03-11 PROCEDURE — C1729 CATH, DRAINAGE: HCPCS | Performed by: THORACIC SURGERY (CARDIOTHORACIC VASCULAR SURGERY)

## 2024-03-11 PROCEDURE — 84132 ASSAY OF SERUM POTASSIUM: CPT

## 2024-03-11 PROCEDURE — 99291 CRITICAL CARE FIRST HOUR: CPT | Performed by: INTERNAL MEDICINE

## 2024-03-11 PROCEDURE — 160031 HCHG SURGERY MINUTES - 1ST 30 MINS LEVEL 5: Performed by: THORACIC SURGERY (CARDIOTHORACIC VASCULAR SURGERY)

## 2024-03-11 PROCEDURE — 700102 HCHG RX REV CODE 250 W/ 637 OVERRIDE(OP): Performed by: INTERNAL MEDICINE

## 2024-03-11 PROCEDURE — 021209W BYPASS CORONARY ARTERY, THREE ARTERIES FROM AORTA WITH AUTOLOGOUS VENOUS TISSUE, OPEN APPROACH: ICD-10-PCS | Performed by: THORACIC SURGERY (CARDIOTHORACIC VASCULAR SURGERY)

## 2024-03-11 PROCEDURE — 33508 ENDOSCOPIC VEIN HARVEST: CPT | Mod: 59 | Performed by: THORACIC SURGERY (CARDIOTHORACIC VASCULAR SURGERY)

## 2024-03-11 PROCEDURE — 503001 HCHG PERFUSION: Performed by: THORACIC SURGERY (CARDIOTHORACIC VASCULAR SURGERY)

## 2024-03-11 PROCEDURE — 94669 MECHANICAL CHEST WALL OSCILL: CPT

## 2024-03-11 PROCEDURE — 85018 HEMOGLOBIN: CPT

## 2024-03-11 PROCEDURE — 700111 HCHG RX REV CODE 636 W/ 250 OVERRIDE (IP)

## 2024-03-11 PROCEDURE — 85730 THROMBOPLASTIN TIME PARTIAL: CPT

## 2024-03-11 PROCEDURE — C1751 CATH, INF, PER/CENT/MIDLINE: HCPCS | Performed by: THORACIC SURGERY (CARDIOTHORACIC VASCULAR SURGERY)

## 2024-03-11 PROCEDURE — 94002 VENT MGMT INPAT INIT DAY: CPT

## 2024-03-11 PROCEDURE — 700105 HCHG RX REV CODE 258: Performed by: THORACIC SURGERY (CARDIOTHORACIC VASCULAR SURGERY)

## 2024-03-11 PROCEDURE — 160042 HCHG SURGERY MINUTES - EA ADDL 1 MIN LEVEL 5: Performed by: THORACIC SURGERY (CARDIOTHORACIC VASCULAR SURGERY)

## 2024-03-11 PROCEDURE — 94799 UNLISTED PULMONARY SVC/PX: CPT

## 2024-03-11 PROCEDURE — A9270 NON-COVERED ITEM OR SERVICE: HCPCS | Performed by: INTERNAL MEDICINE

## 2024-03-11 PROCEDURE — 33519 CABG ARTERY-VEIN THREE: CPT | Mod: AS | Performed by: NURSE PRACTITIONER

## 2024-03-11 PROCEDURE — 33533 CABG ARTERIAL SINGLE: CPT | Performed by: THORACIC SURGERY (CARDIOTHORACIC VASCULAR SURGERY)

## 2024-03-11 PROCEDURE — 700111 HCHG RX REV CODE 636 W/ 250 OVERRIDE (IP): Performed by: NURSE PRACTITIONER

## 2024-03-11 PROCEDURE — 06BQ0ZZ EXCISION OF LEFT SAPHENOUS VEIN, OPEN APPROACH: ICD-10-PCS | Performed by: THORACIC SURGERY (CARDIOTHORACIC VASCULAR SURGERY)

## 2024-03-11 PROCEDURE — 160009 HCHG ANES TIME/MIN: Performed by: THORACIC SURGERY (CARDIOTHORACIC VASCULAR SURGERY)

## 2024-03-11 PROCEDURE — 160048 HCHG OR STATISTICAL LEVEL 1-5: Performed by: THORACIC SURGERY (CARDIOTHORACIC VASCULAR SURGERY)

## 2024-03-11 PROCEDURE — 80048 BASIC METABOLIC PNL TOTAL CA: CPT

## 2024-03-11 DEVICE — MARKER GRAFT AC VEIN DISK SHAPED (10EA/BX): Type: IMPLANTABLE DEVICE | Site: HEART | Status: FUNCTIONAL

## 2024-03-11 RX ORDER — CLOPIDOGREL BISULFATE 75 MG/1
75 TABLET ORAL DAILY
Status: DISCONTINUED | OUTPATIENT
Start: 2024-03-12 | End: 2024-03-16 | Stop reason: HOSPADM

## 2024-03-11 RX ORDER — MIDAZOLAM HYDROCHLORIDE 1 MG/ML
INJECTION INTRAMUSCULAR; INTRAVENOUS PRN
Status: DISCONTINUED | OUTPATIENT
Start: 2024-03-11 | End: 2024-03-11

## 2024-03-11 RX ORDER — SODIUM CHLORIDE, SODIUM LACTATE, POTASSIUM CHLORIDE, CALCIUM CHLORIDE 600; 310; 30; 20 MG/100ML; MG/100ML; MG/100ML; MG/100ML
INJECTION, SOLUTION INTRAVENOUS
Status: DISCONTINUED | OUTPATIENT
Start: 2024-03-11 | End: 2024-03-11 | Stop reason: SURG

## 2024-03-11 RX ORDER — ENEMA 19; 7 G/133ML; G/133ML
1 ENEMA RECTAL
Status: DISCONTINUED | OUTPATIENT
Start: 2024-03-11 | End: 2024-03-16 | Stop reason: HOSPADM

## 2024-03-11 RX ORDER — POLYETHYLENE GLYCOL 3350 17 G/17G
1 POWDER, FOR SOLUTION ORAL 2 TIMES DAILY PRN
Status: DISCONTINUED | OUTPATIENT
Start: 2024-03-11 | End: 2024-03-16 | Stop reason: HOSPADM

## 2024-03-11 RX ORDER — ACETAMINOPHEN 325 MG/1
650 TABLET ORAL EVERY 4 HOURS PRN
Status: DISCONTINUED | OUTPATIENT
Start: 2024-03-11 | End: 2024-03-16 | Stop reason: HOSPADM

## 2024-03-11 RX ORDER — METHADONE HYDROCHLORIDE 10 MG/ML
INJECTION, SOLUTION INTRAMUSCULAR; INTRAVENOUS; SUBCUTANEOUS PRN
Status: DISCONTINUED | OUTPATIENT
Start: 2024-03-11 | End: 2024-03-11

## 2024-03-11 RX ORDER — PROTAMINE SULFATE 10 MG/ML
INJECTION, SOLUTION INTRAVENOUS PRN
Status: DISCONTINUED | OUTPATIENT
Start: 2024-03-11 | End: 2024-03-11 | Stop reason: SURG

## 2024-03-11 RX ORDER — DEXTROSE MONOHYDRATE 25 G/50ML
12.5-25 INJECTION, SOLUTION INTRAVENOUS PRN
Status: DISCONTINUED | OUTPATIENT
Start: 2024-03-11 | End: 2024-03-12

## 2024-03-11 RX ORDER — OMEPRAZOLE 20 MG/1
20 CAPSULE, DELAYED RELEASE ORAL DAILY
Status: DISCONTINUED | OUTPATIENT
Start: 2024-03-12 | End: 2024-03-16 | Stop reason: HOSPADM

## 2024-03-11 RX ORDER — ONDANSETRON 2 MG/ML
8 INJECTION INTRAMUSCULAR; INTRAVENOUS EVERY 6 HOURS PRN
Status: DISCONTINUED | OUTPATIENT
Start: 2024-03-11 | End: 2024-03-16 | Stop reason: HOSPADM

## 2024-03-11 RX ORDER — SODIUM CHLORIDE, SODIUM GLUCONATE, SODIUM ACETATE, POTASSIUM CHLORIDE AND MAGNESIUM CHLORIDE 526; 502; 368; 37; 30 MG/100ML; MG/100ML; MG/100ML; MG/100ML; MG/100ML
INJECTION, SOLUTION INTRAVENOUS PRN
Status: DISCONTINUED | OUTPATIENT
Start: 2024-03-11 | End: 2024-03-16

## 2024-03-11 RX ORDER — DIPHENHYDRAMINE HCL 25 MG
25 TABLET ORAL
Status: DISCONTINUED | OUTPATIENT
Start: 2024-03-11 | End: 2024-03-16 | Stop reason: HOSPADM

## 2024-03-11 RX ORDER — SODIUM CHLORIDE 9 MG/ML
INJECTION, SOLUTION INTRAVENOUS CONTINUOUS
Status: DISCONTINUED | OUTPATIENT
Start: 2024-03-11 | End: 2024-03-14

## 2024-03-11 RX ORDER — ENOXAPARIN SODIUM 100 MG/ML
40 INJECTION SUBCUTANEOUS DAILY
Status: DISCONTINUED | OUTPATIENT
Start: 2024-03-12 | End: 2024-03-16 | Stop reason: HOSPADM

## 2024-03-11 RX ORDER — ATORVASTATIN CALCIUM 40 MG/1
40 TABLET, FILM COATED ORAL
Status: DISCONTINUED | OUTPATIENT
Start: 2024-03-11 | End: 2024-03-16 | Stop reason: HOSPADM

## 2024-03-11 RX ORDER — DEXMEDETOMIDINE HYDROCHLORIDE 4 UG/ML
0-1.5 INJECTION, SOLUTION INTRAVENOUS CONTINUOUS
Status: DISCONTINUED | OUTPATIENT
Start: 2024-03-11 | End: 2024-03-13

## 2024-03-11 RX ORDER — PHENYLEPHRINE HCL IN 0.9% NACL 0.5 MG/5ML
SYRINGE (ML) INTRAVENOUS PRN
Status: DISCONTINUED | OUTPATIENT
Start: 2024-03-11 | End: 2024-03-11

## 2024-03-11 RX ORDER — LIDOCAINE HYDROCHLORIDE 20 MG/ML
INJECTION, SOLUTION EPIDURAL; INFILTRATION; INTRACAUDAL; PERINEURAL PRN
Status: DISCONTINUED | OUTPATIENT
Start: 2024-03-11 | End: 2024-03-11 | Stop reason: SURG

## 2024-03-11 RX ORDER — EPINEPHRINE HCL IN 0.9 % NACL 4MG/250ML
0-.5 PLASTIC BAG, INJECTION (ML) INTRAVENOUS CONTINUOUS
Status: DISCONTINUED | OUTPATIENT
Start: 2024-03-11 | End: 2024-03-14

## 2024-03-11 RX ORDER — MAGNESIUM HYDROXIDE 1200 MG/15ML
LIQUID ORAL
Status: DISCONTINUED | OUTPATIENT
Start: 2024-03-11 | End: 2024-03-11

## 2024-03-11 RX ORDER — DOCUSATE SODIUM 100 MG/1
100 CAPSULE, LIQUID FILLED ORAL 2 TIMES DAILY
Status: DISCONTINUED | OUTPATIENT
Start: 2024-03-11 | End: 2024-03-16 | Stop reason: HOSPADM

## 2024-03-11 RX ORDER — CEFAZOLIN SODIUM 1 G/3ML
INJECTION, POWDER, FOR SOLUTION INTRAMUSCULAR; INTRAVENOUS PRN
Status: DISCONTINUED | OUTPATIENT
Start: 2024-03-11 | End: 2024-03-11 | Stop reason: SURG

## 2024-03-11 RX ORDER — ACETAMINOPHEN 500 MG
1000 TABLET ORAL EVERY 6 HOURS PRN
Status: DISCONTINUED | OUTPATIENT
Start: 2024-03-21 | End: 2024-03-16 | Stop reason: HOSPADM

## 2024-03-11 RX ORDER — BISACODYL 10 MG
10 SUPPOSITORY, RECTAL RECTAL
Status: DISCONTINUED | OUTPATIENT
Start: 2024-03-11 | End: 2024-03-16 | Stop reason: HOSPADM

## 2024-03-11 RX ORDER — MORPHINE SULFATE 4 MG/ML
4 INJECTION INTRAVENOUS
Status: DISCONTINUED | OUTPATIENT
Start: 2024-03-11 | End: 2024-03-13

## 2024-03-11 RX ORDER — SODIUM CHLORIDE 9 MG/ML
INJECTION, SOLUTION INTRAVENOUS
Status: DISCONTINUED | OUTPATIENT
Start: 2024-03-11 | End: 2024-03-11 | Stop reason: SURG

## 2024-03-11 RX ORDER — HEPARIN SODIUM,PORCINE 1000/ML
VIAL (ML) INJECTION PRN
Status: DISCONTINUED | OUTPATIENT
Start: 2024-03-11 | End: 2024-03-11

## 2024-03-11 RX ORDER — POTASSIUM CHLORIDE 7.45 MG/ML
10 INJECTION INTRAVENOUS ONCE
Status: COMPLETED | OUTPATIENT
Start: 2024-03-11 | End: 2024-03-11

## 2024-03-11 RX ORDER — AMOXICILLIN 250 MG
1 CAPSULE ORAL
Status: DISCONTINUED | OUTPATIENT
Start: 2024-03-11 | End: 2024-03-16 | Stop reason: HOSPADM

## 2024-03-11 RX ORDER — NITROGLYCERIN 20 MG/100ML
0-100 INJECTION INTRAVENOUS CONTINUOUS
Status: DISCONTINUED | OUTPATIENT
Start: 2024-03-11 | End: 2024-03-14

## 2024-03-11 RX ORDER — OXYCODONE HYDROCHLORIDE 5 MG/1
5 TABLET ORAL
Status: DISCONTINUED | OUTPATIENT
Start: 2024-03-11 | End: 2024-03-16 | Stop reason: HOSPADM

## 2024-03-11 RX ORDER — SODIUM CHLORIDE, SODIUM GLUCONATE, SODIUM ACETATE, POTASSIUM CHLORIDE AND MAGNESIUM CHLORIDE 526; 502; 368; 37; 30 MG/100ML; MG/100ML; MG/100ML; MG/100ML; MG/100ML
INJECTION, SOLUTION INTRAVENOUS
Status: DISCONTINUED | OUTPATIENT
Start: 2024-03-11 | End: 2024-03-11 | Stop reason: SURG

## 2024-03-11 RX ORDER — AMOXICILLIN 250 MG
1 CAPSULE ORAL NIGHTLY
Status: DISCONTINUED | OUTPATIENT
Start: 2024-03-11 | End: 2024-03-16 | Stop reason: HOSPADM

## 2024-03-11 RX ORDER — TRAMADOL HYDROCHLORIDE 50 MG/1
50 TABLET ORAL EVERY 4 HOURS PRN
Status: DISCONTINUED | OUTPATIENT
Start: 2024-03-11 | End: 2024-03-16 | Stop reason: HOSPADM

## 2024-03-11 RX ORDER — ACETAMINOPHEN 500 MG
1000 TABLET ORAL EVERY 6 HOURS
Qty: 80 TABLET | Refills: 0 | Status: DISCONTINUED | OUTPATIENT
Start: 2024-03-11 | End: 2024-03-16 | Stop reason: HOSPADM

## 2024-03-11 RX ORDER — ALUMINA, MAGNESIA, AND SIMETHICONE 2400; 2400; 240 MG/30ML; MG/30ML; MG/30ML
30 SUSPENSION ORAL EVERY 4 HOURS PRN
Status: DISCONTINUED | OUTPATIENT
Start: 2024-03-11 | End: 2024-03-16 | Stop reason: HOSPADM

## 2024-03-11 RX ORDER — MIDAZOLAM HYDROCHLORIDE 1 MG/ML
2 INJECTION INTRAMUSCULAR; INTRAVENOUS
Status: DISCONTINUED | OUTPATIENT
Start: 2024-03-11 | End: 2024-03-13

## 2024-03-11 RX ORDER — ASPIRIN 81 MG/1
81 TABLET ORAL DAILY
Status: DISCONTINUED | OUTPATIENT
Start: 2024-03-12 | End: 2024-03-16 | Stop reason: HOSPADM

## 2024-03-11 RX ORDER — ROCURONIUM BROMIDE 10 MG/ML
INJECTION, SOLUTION INTRAVENOUS PRN
Status: DISCONTINUED | OUTPATIENT
Start: 2024-03-11 | End: 2024-03-11 | Stop reason: SURG

## 2024-03-11 RX ORDER — PROCHLORPERAZINE EDISYLATE 5 MG/ML
10 INJECTION INTRAMUSCULAR; INTRAVENOUS EVERY 6 HOURS PRN
Status: DISCONTINUED | OUTPATIENT
Start: 2024-03-11 | End: 2024-03-16 | Stop reason: HOSPADM

## 2024-03-11 RX ORDER — INSULIN LISPRO 100 [IU]/ML
0-14 INJECTION, SOLUTION INTRAVENOUS; SUBCUTANEOUS
Qty: 9 ML | Refills: 0 | Status: ACTIVE | OUTPATIENT
Start: 2024-03-11 | End: 2024-03-12

## 2024-03-11 RX ORDER — METHADONE HYDROCHLORIDE 10 MG/ML
20 INJECTION, SOLUTION INTRAMUSCULAR; INTRAVENOUS; SUBCUTANEOUS ONCE
Status: DISCONTINUED | OUTPATIENT
Start: 2024-03-11 | End: 2024-03-11

## 2024-03-11 RX ORDER — MAGNESIUM SULFATE 1 G/100ML
1 INJECTION INTRAVENOUS DAILY
Status: COMPLETED | OUTPATIENT
Start: 2024-03-11 | End: 2024-03-13

## 2024-03-11 RX ORDER — PAPAVERINE HYDROCHLORIDE 30 MG/ML
INJECTION INTRAMUSCULAR; INTRAVENOUS
Status: DISCONTINUED | OUTPATIENT
Start: 2024-03-11 | End: 2024-03-11

## 2024-03-11 RX ORDER — ACETAMINOPHEN 650 MG/1
650 SUPPOSITORY RECTAL EVERY 4 HOURS PRN
Status: DISCONTINUED | OUTPATIENT
Start: 2024-03-11 | End: 2024-03-16 | Stop reason: HOSPADM

## 2024-03-11 RX ORDER — OXYCODONE HYDROCHLORIDE 10 MG/1
10 TABLET ORAL
Status: DISCONTINUED | OUTPATIENT
Start: 2024-03-11 | End: 2024-03-16 | Stop reason: HOSPADM

## 2024-03-11 RX ORDER — NOREPINEPHRINE BITARTRATE 0.03 MG/ML
0-1 INJECTION, SOLUTION INTRAVENOUS CONTINUOUS
Status: DISCONTINUED | OUTPATIENT
Start: 2024-03-11 | End: 2024-03-14

## 2024-03-11 RX ADMIN — PROPOFOL 100 MG: 10 INJECTION, EMULSION INTRAVENOUS at 07:30

## 2024-03-11 RX ADMIN — SODIUM CHLORIDE 2 UNITS/HR: 9 INJECTION, SOLUTION INTRAVENOUS at 09:31

## 2024-03-11 RX ADMIN — PROPOFOL 50 MG: 10 INJECTION, EMULSION INTRAVENOUS at 11:44

## 2024-03-11 RX ADMIN — METHADONE HYDROCHLORIDE 10 MG: 10 INJECTION, SOLUTION INTRAMUSCULAR; INTRAVENOUS; SUBCUTANEOUS at 07:24

## 2024-03-11 RX ADMIN — ROCURONIUM BROMIDE 50 MG: 50 INJECTION, SOLUTION INTRAVENOUS at 09:15

## 2024-03-11 RX ADMIN — ACETAMINOPHEN 1000 MG: 500 TABLET, FILM COATED ORAL at 18:34

## 2024-03-11 RX ADMIN — PROTAMINE SULFATE 300 MG: 10 INJECTION, SOLUTION INTRAVENOUS at 11:21

## 2024-03-11 RX ADMIN — LIDOCAINE HYDROCHLORIDE 80 MG: 20 INJECTION, SOLUTION EPIDURAL; INFILTRATION; INTRACAUDAL at 07:30

## 2024-03-11 RX ADMIN — Medication 100 MCG: at 09:13

## 2024-03-11 RX ADMIN — SODIUM CHLORIDE, SODIUM GLUCONATE, SODIUM ACETATE, POTASSIUM CHLORIDE AND MAGNESIUM CHLORIDE: 526; 502; 368; 37; 30 INJECTION, SOLUTION INTRAVENOUS at 07:40

## 2024-03-11 RX ADMIN — ATORVASTATIN CALCIUM 40 MG: 40 TABLET, FILM COATED ORAL at 20:35

## 2024-03-11 RX ADMIN — SODIUM CHLORIDE, SODIUM GLUCONATE, SODIUM ACETATE, POTASSIUM CHLORIDE AND MAGNESIUM CHLORIDE: 526; 502; 368; 37; 30 INJECTION, SOLUTION INTRAVENOUS at 15:58

## 2024-03-11 RX ADMIN — LEVOTHYROXINE SODIUM 125 MCG: 0.12 TABLET ORAL at 05:25

## 2024-03-11 RX ADMIN — PROPOFOL 50 MG: 10 INJECTION, EMULSION INTRAVENOUS at 08:57

## 2024-03-11 RX ADMIN — PROPOFOL 50 MG: 10 INJECTION, EMULSION INTRAVENOUS at 09:08

## 2024-03-11 RX ADMIN — Medication 200 MCG: at 11:48

## 2024-03-11 RX ADMIN — VANCOMYCIN HYDROCHLORIDE 1500 MG: 5 INJECTION, POWDER, LYOPHILIZED, FOR SOLUTION INTRAVENOUS at 20:42

## 2024-03-11 RX ADMIN — Medication 50 MG: at 08:12

## 2024-03-11 RX ADMIN — NOREPINEPHRINE BITARTRATE 0.05 MCG/MIN: 1 INJECTION, SOLUTION, CONCENTRATE INTRAVENOUS at 07:31

## 2024-03-11 RX ADMIN — METOPROLOL TARTRATE 25 MG: 25 TABLET, FILM COATED ORAL at 05:25

## 2024-03-11 RX ADMIN — PROPOFOL 30 MG: 10 INJECTION, EMULSION INTRAVENOUS at 07:35

## 2024-03-11 RX ADMIN — PROPOFOL 50 MG: 10 INJECTION, EMULSION INTRAVENOUS at 07:48

## 2024-03-11 RX ADMIN — POTASSIUM CHLORIDE 10 MEQ: 7.46 INJECTION, SOLUTION INTRAVENOUS at 19:11

## 2024-03-11 RX ADMIN — Medication 50 MG: at 07:30

## 2024-03-11 RX ADMIN — SODIUM CHLORIDE: 9 INJECTION, SOLUTION INTRAVENOUS at 07:40

## 2024-03-11 RX ADMIN — SODIUM CHLORIDE, POTASSIUM CHLORIDE, SODIUM LACTATE AND CALCIUM CHLORIDE: 600; 310; 30; 20 INJECTION, SOLUTION INTRAVENOUS at 07:20

## 2024-03-11 RX ADMIN — CEFAZOLIN 2 G: 1 INJECTION, POWDER, FOR SOLUTION INTRAMUSCULAR; INTRAVENOUS at 08:02

## 2024-03-11 RX ADMIN — OXYCODONE 5 MG: 5 TABLET ORAL at 18:43

## 2024-03-11 RX ADMIN — ROCURONIUM BROMIDE 40 MG: 50 INJECTION, SOLUTION INTRAVENOUS at 08:07

## 2024-03-11 RX ADMIN — HEPARIN SODIUM 32000 UNITS: 1000 INJECTION, SOLUTION INTRAVENOUS; SUBCUTANEOUS at 09:06

## 2024-03-11 RX ADMIN — MAGNESIUM SULFATE IN DEXTROSE 1 G: 10 INJECTION, SOLUTION INTRAVENOUS at 12:49

## 2024-03-11 RX ADMIN — POTASSIUM CHLORIDE 10 MEQ: 7.46 INJECTION, SOLUTION INTRAVENOUS at 14:08

## 2024-03-11 RX ADMIN — DOCUSATE SODIUM 50 MG AND SENNOSIDES 8.6 MG 1 TABLET: 8.6; 5 TABLET, FILM COATED ORAL at 20:35

## 2024-03-11 RX ADMIN — AMLODIPINE BESYLATE 5 MG: 10 TABLET ORAL at 05:25

## 2024-03-11 RX ADMIN — AMINOCAPROIC ACID 7.64 G: 250 INJECTION, SOLUTION INTRAVENOUS at 09:06

## 2024-03-11 RX ADMIN — AMINOCAPROIC ACID 2 G/HR: 250 INJECTION, SOLUTION INTRAVENOUS at 09:36

## 2024-03-11 RX ADMIN — DOCUSATE SODIUM 100 MG: 100 CAPSULE, LIQUID FILLED ORAL at 18:34

## 2024-03-11 RX ADMIN — ROCURONIUM BROMIDE 50 MG: 50 INJECTION, SOLUTION INTRAVENOUS at 11:04

## 2024-03-11 RX ADMIN — MIDAZOLAM HYDROCHLORIDE 2 MG: 1 INJECTION, SOLUTION INTRAMUSCULAR; INTRAVENOUS at 07:24

## 2024-03-11 RX ADMIN — DEXMEDETOMIDINE HYDROCHLORIDE 0.5 MCG/KG/HR: 100 INJECTION, SOLUTION INTRAVENOUS at 11:21

## 2024-03-11 RX ADMIN — ROCURONIUM BROMIDE 60 MG: 50 INJECTION, SOLUTION INTRAVENOUS at 07:30

## 2024-03-11 RX ADMIN — ACETAMINOPHEN 1000 MG: 500 TABLET ORAL at 06:34

## 2024-03-11 RX ADMIN — Medication 1 APPLICATOR: at 20:35

## 2024-03-11 RX ADMIN — METHADONE HYDROCHLORIDE 10 MG: 10 INJECTION, SOLUTION INTRAMUSCULAR; INTRAVENOUS; SUBCUTANEOUS at 08:12

## 2024-03-11 RX ADMIN — CEFAZOLIN 1 G: 1 INJECTION, POWDER, FOR SOLUTION INTRAMUSCULAR; INTRAVENOUS at 12:02

## 2024-03-11 RX ADMIN — HEPARIN SODIUM 2000 UNITS: 1000 INJECTION, SOLUTION INTRAVENOUS; SUBCUTANEOUS at 00:59

## 2024-03-11 RX ADMIN — SODIUM CHLORIDE: 9 INJECTION, SOLUTION INTRAVENOUS at 12:48

## 2024-03-11 RX ADMIN — Medication 1 APPLICATOR: at 12:50

## 2024-03-11 ASSESSMENT — FIBROSIS 4 INDEX: FIB4 SCORE: 1.37

## 2024-03-11 ASSESSMENT — COPD QUESTIONNAIRES
DO YOU EVER COUGH UP ANY MUCUS OR PHLEGM?: NO/ONLY WITH OCCASIONAL COLDS OR INFECTIONS
DURING THE PAST 4 WEEKS HOW MUCH DID YOU FEEL SHORT OF BREATH: NONE/LITTLE OF THE TIME
HAVE YOU SMOKED AT LEAST 100 CIGARETTES IN YOUR ENTIRE LIFE: NO/DON'T KNOW
COPD SCREENING SCORE: 1

## 2024-03-11 ASSESSMENT — PAIN DESCRIPTION - PAIN TYPE
TYPE: SURGICAL PAIN

## 2024-03-11 ASSESSMENT — PATIENT HEALTH QUESTIONNAIRE - PHQ9
1. LITTLE INTEREST OR PLEASURE IN DOING THINGS: NOT AT ALL
2. FEELING DOWN, DEPRESSED, IRRITABLE, OR HOPELESS: NOT AT ALL
SUM OF ALL RESPONSES TO PHQ9 QUESTIONS 1 AND 2: 0

## 2024-03-11 ASSESSMENT — PULMONARY FUNCTION TESTS: FVC: 2

## 2024-03-11 NOTE — CONSULTS
Critical Care Consultation    Date of consult: 3/11/2024    Referring Physician  No att. providers found    Reason for Consultation  Critical care management post CABG    History of Presenting Illness  63 y.o. male w/PMHx HTN, DLD, DM who presented 3/7/2024 with chest pain which was relieved with aspirin heparin.  Troponin was elevated and angiogram revealed multivessel disease.  Patient seen by [facility] and is taken electively to the operating room for three-vessel CABG which was performed today.  No complications intraoperatively.  V wires functional but did not require pacing.  Mild hypotension on low-dose norepinephrine infusion.  Blood sugar a bit labile currently on 1 unit of insulin.  Sedated with dexmedetomidine at 0.5 but still somnolent and not moving because he was not reversed.  Initial blood gas post op 7.42/37/247.  CXR not released yet but anesthesia did see the film and felt tubes and lines were in good position without significant edema, atelectasis or pneumothorax.  Case reviewed at length at the bedside with cardiac surgery and cardiac anesthesia.    Code Status  Full Code    Review of Systems  Review of Systems   Unable to perform ROS: Acuity of condition       Past Medical History   has a past medical history of Diabetes (HCC) and Hypertension.    Surgical History   has a past surgical history that includes splenectomy.    Family History  family history is not on file.    Social History   reports that he has never smoked. He has never used smokeless tobacco. He reports current alcohol use. He reports current drug use. Drug: Inhaled.    Medications  Home Medications       Reviewed by Beryl Salas R.N. (Registered Nurse) on 03/11/24 at 0739  Med List Status: Complete     Medication Last Dose Status   acetaminophen (Tylenol) tablet 650 mg  Active   allopurinol (ZYLOPRIM) 300 MG Tab 3/6/2024 Active   Alpha-Lipoic Acid 300 MG Tab 3/6/2024 Active   aminocaproic acid (Amicar) 5 g in  mL Infusion   Active   aminocaproic acid (Amicar) 7.64 g in  mL IV Bolus  Active   amLODIPine (Norvasc) tablet 5 mg  Active   aspirin (Asa) chewable tab 81 mg  Active   atorvastatin (LIPITOR) 10 MG Tab 3/6/2024 Active   atorvastatin (Lipitor) tablet 80 mg  Active   CYANOCOBALAMIN PO 3/6/2024 Active   dexmedetomidine (PRECEDEX) 400 mcg/100mL NS premix infusion  Active   dextrose 10 % BOLUS 25 g  Active   EPINEPHrine (Adrenalin) infusion 4 mg/250 mL (premix)  Active   heparin infusion 25,000 units in 500 mL 0.45% NACL  Active   heparin injection 2,000 Units  Active   insulin regular (Humulin R) 100 Units in  mL Infusion  Active   insulin regular (HumuLIN R,NovoLIN R) injection  Active   Lactated Ringers  Active   lactulose (CONSTULOSE) 10 GM/15ML Solution 3/6/2024 Active   levothyroxine (SYNTHROID) 125 MCG Tab 3/6/2024 Active   levothyroxine (Synthroid) tablet 125 mcg  Active   lidocaine (Xylocaine) 1 % injection 0.5 mL  Active   lisinopril (PRINIVIL) 10 MG Tab 3/6/2024 Active   Magnesium 250 MG Tab 3/6/2024 Active   metFORMIN (GLUCOPHAGE) 500 MG Tab 3/6/2024 Active   methadone 10 mg/mL (Dolophine) injection  Active   metoprolol tartrate (Lopressor) tablet 25 mg  Active   midazolam (Versed) injection  Active   Multiple Vitamin (ONE-DAILY MULTIVITAMINS) Tab 3/6/2024 Active   nitroglycerin (Nitrostat) tablet 0.4 mg  Active   norepinephrine (Levophed) 8 mg in 250 mL NS infusion (premix)  Active   ondansetron (Zofran ODT) dispertab 4 mg  Active   ondansetron (Zofran) syringe/vial injection 4 mg  Active   polyethylene glycol/lytes (Miralax) Packet 1 Packet  Active   prochlorperazine (Compazine) injection 5-10 mg  Active   promethazine (Phenergan) suppository 12.5-25 mg  Active   promethazine (Phenergan) tablet 12.5-25 mg  Active   senna-docusate (Pericolace Or Senokot S) 8.6-50 MG per tablet 2 Tablet  Active   tamsulosin (FLOMAX) 0.4 MG capsule 3/6/2024 Active   tamsulosin (Flomax) capsule 0.4 mg  Active   vancomycin 1500  mg/250mL NS IVPB premix  Active                  Current Facility-Administered Medications   Medication Dose Route Frequency Provider Last Rate Last Admin    insulin regular (HumuLIN R,NovoLIN R) injection  0-14 Units Intravenous Once Salas Chapman D.O.        insulin lispro (HumaLOG,AdmeLOG) injection  0-14 Units Subcutaneous TID AC CHASE Cantu.OPat        insulin regular (Humulin R) 100 Units in  mL Infusion  0-29 Units/hr Intravenous Continuous CHASE Cantu.OPat 1.5 mL/hr at 03/11/24 1239 1.5 Units/hr at 03/11/24 1239    dextrose 50% (D50W) injection 12.5-25 g  12.5-25 g Intravenous PRN Salas Chapman D.O. MD Alert...Pharmacy to initiate transition from insulin infusion to subcutaneous insulin for cardiothoracic surgery 1 Each  1 Each Other Continuous CHASE Cantu.OPat        norepinephrine (Levophed) 8 mg in 250 mL NS infusion (premix)  0-1 mcg/kg/min (Ideal) Intravenous Continuous Salas Chapman D.O. 8.2 mL/hr at 03/11/24 1230 0.06 mcg/kg/min at 03/11/24 1230    EPINEPHrine (Adrenalin) infusion 4 mg/250 mL (premix)  0-0.5 mcg/kg/min (Ideal) Intravenous Continuous Salas Chapman D.OPat   Dose not Required at 03/11/24 1245    Respiratory Therapy Consult   Nebulization Continuous RT Monty Gallego, A.P.R.N.        NS infusion   Intravenous Continuous LIVAN Rangel.P.R.N. 10 mL/hr at 03/11/24 1248 New Bag at 03/11/24 1248    NS infusion   Intravenous Continuous Monty Gallego A.P.R.N. 50 mL/hr at 03/11/24 1230 Associate Infusion Pump at 03/11/24 1230    electrolyte-A (Plasmalyte-A) infusion   Intravenous PRN LIVAN Rangel.P.R.N.        Nozin nasal  swab  1 Applicator Each Nostril BID LIVAN Rangel.P.R.N.   1 Applicator at 03/11/24 1250    calcium CHLORIDE 10 % 1,000 mg in dextrose 5% 100 mL IVPB  1,000 mg Intravenous Once PRN MEJIA RangelP.RLEANA        magnesium sulfate in D5W IVPB premix 1 g  1 g Intravenous DAILY Monty SCHWARTZ  Lashonda, A.P.R.N. 100 mL/hr at 03/11/24 1249 1 g at 03/11/24 1249    K+ Scale: Goal of 4.5  1 Each Intravenous Q6HRS Monty Gallego, A.P.R.N.        [START ON 3/12/2024] metoprolol tartrate (Lopressor) tablet 12.5 mg  12.5 mg Oral BID Monty Gallego, A.P.R.N.        Followed by    [START ON 3/13/2024] metoprolol tartrate (Lopressor) tablet 25 mg  25 mg Oral BID Monty Gallego, A.P.R.N.        [START ON 3/12/2024] aspirin EC tablet 81 mg  81 mg Oral DAILY Monty Gallego, A.P.R.N.        [START ON 3/12/2024] clopidogrel (Plavix) tablet 75 mg  75 mg Oral DAILY Monty Gallego, A.P.R.N.        atorvastatin (Lipitor) tablet 40 mg  40 mg Oral QHS Monty Gallego, A.P.R.N.        clevidipine (Cleviprex) IV emulsion  0-21 mg/hr Intravenous Continuous Monty Gallego, A.P.R.N.        nitroglycerin 50 mg in D5W 250 ml infusion  0-100 mcg/min Intravenous Continuous Monty Gallego, A.P.R.N.   Dose not Required at 03/11/24 1245    Pharmacy Consult Request ...Pain Management Review 1 Each  1 Each Other PHARMACY TO DOSE Monty Gallego, A.P.R.N.        acetaminophen (Tylenol) tablet 1,000 mg  1,000 mg Oral Q6HRS Monty Gallego, A.P.R.N.        Followed by    [START ON 3/21/2024] acetaminophen (Tylenol) tablet 1,000 mg  1,000 mg Oral Q6HRS PRN Monty Gallego, A.P.R.N.        oxyCODONE immediate-release (Roxicodone) tablet 5 mg  5 mg Oral Q3HRS PRN Monty Gallego, A.P.R.N.        Or    oxyCODONE immediate release (Roxicodone) tablet 10 mg  10 mg Oral Q3HRS PRN Monty Gallego, A.P.R.N.        Or    fentaNYL (Sublimaze) injection 50 mcg  50 mcg Intravenous Q3HRS PRN Monty Gallego, A.P.R.N.        traMADol (Ultram) 50 MG tablet 50 mg  50 mg Oral Q4HRS PRN Monty Gallego, A.P.R.N.        midazolam (Versed) injection 2 mg  2 mg Intravenous Q HOUR PRN Monty Gallego, A.P.R.N.        dexmedetomidine (PRECEDEX) 400 mcg/100mL NS premix infusion  0-1.5 mcg/kg/hr (Ideal) Intravenous Continuous Monty Gallego, A.P.R.N.        sodium bicarbonate 8.4 % injection 50  mEq  50 mEq Intravenous Q HOUR PRN Monty Gallego, A.P.R.N.        morphine 4 MG/ML injection 4 mg  4 mg Intravenous Q HOUR PRN Monty Gallego, A.P.R.N.        ondansetron (Zofran) syringe/vial injection 8 mg  8 mg Intravenous Q6HRS PRN Monty Gallego, A.P.R.N.        Or    prochlorperazine (Compazine) injection 10 mg  10 mg Intravenous Q6HRS PRN Monty Gallego, A.P.R.N.        acetaminophen (Tylenol) tablet 650 mg  650 mg Oral Q4HRS PRN Monty Gallego, A.P.R.N.        Or    acetaminophen (Tylenol) suppository 650 mg  650 mg Rectal Q4HRS PRN Monty Gallego, A.P.R.N.        docusate sodium (Colace) capsule 100 mg  100 mg Oral BID Monty Gallego, A.P.R.N.        senna-docusate (Pericolace Or Senokot S) 8.6-50 MG per tablet 1 Tablet  1 Tablet Oral Nightly Monty Gallego, A.P.R.N.        senna-docusate (Pericolace Or Senokot S) 8.6-50 MG per tablet 1 Tablet  1 Tablet Oral Q24HRS PRN Monty Gallego, A.P.R.N.        polyethylene glycol/lytes (Miralax) Packet 1 Packet  1 Packet Oral BID PRN Monty Gallego, A.P.R.N.        magnesium hydroxide (Milk Of Magnesia) suspension 30 mL  30 mL Oral QDAY PRN Monty Gallego, A.P.R.N.        bisacodyl (Dulcolax) suppository 10 mg  10 mg Rectal Q24HRS PRN Monty Gallego, A.P.R.N.        sodium phosphate (Fleet) enema 133 mL  1 Each Rectal Once PRN Monty Gallego, A.P.R.N.        [START ON 3/12/2024] omeprazole (PriLOSEC) capsule 20 mg  20 mg Oral DAILY Monty Gallego, A.P.R.N.        mag hydrox-al hydrox-simeth (Maalox Plus Es Or Mylanta Ds) suspension 30 mL  30 mL Oral Q4HRS PRN LIVAN Rangel.P.R.N.        diphenhydrAMINE (Benadryl) tablet/capsule 25 mg  25 mg Oral HS PRN - MR X 1 LIVAN Rangel.P.R.WOLFGANG.        [START ON 3/12/2024] enoxaparin (Lovenox) inj 40 mg  40 mg Subcutaneous DAILY AT 1800 LIVAN Rangel.P.R.WOLFGANG.        vancomycin (Vancocin) 1,500 mg in  mL IVPB  1,500 mg Intravenous Once LIVAN Rangel.P.R.WOLFGANG.        potassium chloride (Kcl) ivpb 10 mEq  10 mEq Intravenous Once  Salas Chapman D.O.        levothyroxine (Synthroid) tablet 125 mcg  125 mcg Oral QAM NEL Rangel   125 mcg at 03/11/24 0525     Facility-Administered Medications Ordered in Other Encounters   Medication Dose Route Frequency Provider Last Rate Last Admin    Lactated Ringers   Intravenous Intra-Op Continuous Irving Mendosa M.D.   New Bag at 03/11/24 0720    rocuronium (Zemuron) injection   Intravenous PRN Irving Mendosa M.D.   50 mg at 03/11/24 1104    lidocaine PF (Xylocaine-MPF) 2 % injection PF   Intravenous PRWOLFGANG Mendosa M.D.   80 mg at 03/11/24 0730    ketamine (Ketalar) 50 mg/mL injection   Intravenous PRN Irving Mendosa M.D.   50 mg at 03/11/24 0812    propofol (DIPRIVAN) injection   Intravenous PRWOLFGANG Mendosa M.D.   50 mg at 03/11/24 1144    ceFAZolin (Ancef) injection   Intravenous PRN Irving Mendosa M.D.   1 g at 03/11/24 1202    protamine injection   Intravenous PRN Irving Mendosa M.D.   300 mg at 03/11/24 1121       Allergies  No Known Allergies    Vital Signs last 24 hours  Temp:  [35.9 °C (96.6 °F)-37 °C (98.6 °F)] 35.9 °C (96.6 °F)  Pulse:  [59-75] 75  Resp:  [12-49] 24  BP: (108-152)/(51-85) 138/77  SpO2:  [92 %-99 %] 99 %    Physical Exam  Physical Exam  Vitals reviewed.   Constitutional:       General: He is not in acute distress.     Appearance: Normal appearance. He is normal weight. He is not ill-appearing.      Interventions: He is sedated and intubated.   HENT:      Head: Normocephalic and atraumatic.      Mouth/Throat:      Mouth: Mucous membranes are moist.   Eyes:      General: No scleral icterus.     Pupils: Pupils are equal, round, and reactive to light.   Neck:      Vascular: No JVD.      Comments: CVC  Cardiovascular:      Rate and Rhythm: Normal rate and regular rhythm.      Pulses: Normal pulses.      Heart sounds: No murmur heard.     No gallop.   Pulmonary:      Effort: Pulmonary effort is normal. He is intubated.      Breath sounds: No  wheezing, rhonchi or rales.   Abdominal:      General: Bowel sounds are normal. There is no distension.      Palpations: Abdomen is soft.   Genitourinary:     Comments: Baca catheter  Musculoskeletal:      Cervical back: Neck supple.      Comments: Right radial arterial line   Skin:     Coloration: Skin is not cyanotic or mottled.      Nails: There is no clubbing.   Neurological:      Comments: Normal neurologic exam preop, sedate nonreversed on the ventilator on arrival to [facility], cranial nerves intact without participation   Psychiatric:      Comments: RICH         Fluids    Intake/Output Summary (Last 24 hours) at 3/11/2024 1251  Last data filed at 3/11/2024 1200  Gross per 24 hour   Intake 2982.34 ml   Output 6468 ml   Net -3485.66 ml       Laboratory  Recent Results (from the past 48 hour(s))   Heparin Anti-Xa    Collection Time: 03/09/24 12:47 PM   Result Value Ref Range    Heparin Xa (UFH) 0.53 IU/mL   POCT glucose device results    Collection Time: 03/09/24  4:57 PM   Result Value Ref Range    POC Glucose, Blood 112 (H) 65 - 99 mg/dL   Heparin Anti-Xa    Collection Time: 03/09/24  6:46 PM   Result Value Ref Range    Heparin Xa (UFH) 0.46 IU/mL   POCT glucose device results    Collection Time: 03/09/24  9:03 PM   Result Value Ref Range    POC Glucose, Blood 156 (H) 65 - 99 mg/dL   CBC WITH DIFFERENTIAL    Collection Time: 03/10/24  3:55 AM   Result Value Ref Range    WBC 11.4 (H) 4.8 - 10.8 K/uL    RBC 4.13 (L) 4.70 - 6.10 M/uL    Hemoglobin 14.5 14.0 - 18.0 g/dL    Hematocrit 40.9 (L) 42.0 - 52.0 %    MCV 99.0 (H) 81.4 - 97.8 fL    MCH 35.1 (H) 27.0 - 33.0 pg    MCHC 35.5 32.3 - 36.5 g/dL    RDW 48.8 35.9 - 50.0 fL    Platelet Count 250 164 - 446 K/uL    MPV 10.7 9.0 - 12.9 fL    Neutrophils-Polys 43.30 (L) 44.00 - 72.00 %    Lymphocytes 36.30 22.00 - 41.00 %    Monocytes 8.00 0.00 - 13.40 %    Eosinophils 8.00 (H) 0.00 - 6.90 %    Basophils 3.50 (H) 0.00 - 1.80 %    Nucleated RBC 0.00 0.00 - 0.20 /100 WBC     Neutrophils (Absolute) 5.04 1.82 - 7.42 K/uL    Lymphs (Absolute) 4.14 1.00 - 4.80 K/uL    Monos (Absolute) 0.91 (H) 0.00 - 0.85 K/uL    Eos (Absolute) 0.91 (H) 0.00 - 0.51 K/uL    Baso (Absolute) 0.40 (H) 0.00 - 0.12 K/uL    NRBC (Absolute) 0.00 K/uL   Basic Metabolic Panel    Collection Time: 03/10/24  3:55 AM   Result Value Ref Range    Sodium 139 135 - 145 mmol/L    Potassium 4.0 3.6 - 5.5 mmol/L    Chloride 104 96 - 112 mmol/L    Co2 21 20 - 33 mmol/L    Glucose 101 (H) 65 - 99 mg/dL    Bun 11 8 - 22 mg/dL    Creatinine 0.89 0.50 - 1.40 mg/dL    Calcium 8.9 8.5 - 10.5 mg/dL    Anion Gap 14.0 7.0 - 16.0   ESTIMATED GFR    Collection Time: 03/10/24  3:55 AM   Result Value Ref Range    GFR (CKD-EPI) 96 >60 mL/min/1.73 m 2   DIFFERENTIAL MANUAL    Collection Time: 03/10/24  3:55 AM   Result Value Ref Range    Bands-Stabs 0.90 0.00 - 10.00 %    Manual Diff Status PERFORMED     Comment See Comment    PERIPHERAL SMEAR REVIEW    Collection Time: 03/10/24  3:55 AM   Result Value Ref Range    Peripheral Smear Review see below    PLATELET ESTIMATE    Collection Time: 03/10/24  3:55 AM   Result Value Ref Range    Plt Estimation Normal    MORPHOLOGY    Collection Time: 03/10/24  3:55 AM   Result Value Ref Range    RBC Morphology Present     Reactive Lymphocytes Few    ABO Rh Confirm    Collection Time: 03/10/24  3:55 AM   Result Value Ref Range    ABO Rh Confirm O POS    POCT glucose device results    Collection Time: 03/10/24  7:32 AM   Result Value Ref Range    POC Glucose, Blood 110 (H) 65 - 99 mg/dL   Comp Metabolic Panel (CMP)    Collection Time: 03/10/24  8:28 AM   Result Value Ref Range    Sodium 136 135 - 145 mmol/L    Potassium 4.0 3.6 - 5.5 mmol/L    Chloride 103 96 - 112 mmol/L    Co2 21 20 - 33 mmol/L    Anion Gap 12.0 7.0 - 16.0    Glucose 119 (H) 65 - 99 mg/dL    Bun 11 8 - 22 mg/dL    Creatinine 0.77 0.50 - 1.40 mg/dL    Calcium 9.0 8.5 - 10.5 mg/dL    Correct Calcium 9.2 8.5 - 10.5 mg/dL    AST(SGOT) 29 12 -  45 U/L    ALT(SGPT) 29 2 - 50 U/L    Alkaline Phosphatase 68 30 - 99 U/L    Total Bilirubin 0.6 0.1 - 1.5 mg/dL    Albumin 3.8 3.2 - 4.9 g/dL    Total Protein 6.6 6.0 - 8.2 g/dL    Globulin 2.8 1.9 - 3.5 g/dL    A-G Ratio 1.4 g/dL   CBC with Differential    Collection Time: 03/10/24  8:28 AM   Result Value Ref Range    WBC 10.8 4.8 - 10.8 K/uL    RBC 4.27 (L) 4.70 - 6.10 M/uL    Hemoglobin 14.8 14.0 - 18.0 g/dL    Hematocrit 42.6 42.0 - 52.0 %    MCV 99.8 (H) 81.4 - 97.8 fL    MCH 34.7 (H) 27.0 - 33.0 pg    MCHC 34.7 32.3 - 36.5 g/dL    RDW 49.3 35.9 - 50.0 fL    Platelet Count 247 164 - 446 K/uL    MPV 11.0 9.0 - 12.9 fL    Neutrophils-Polys 39.60 (L) 44.00 - 72.00 %    Lymphocytes 41.50 (H) 22.00 - 41.00 %    Monocytes 12.30 0.00 - 13.40 %    Eosinophils 5.10 0.00 - 6.90 %    Basophils 1.20 0.00 - 1.80 %    Immature Granulocytes 0.30 0.00 - 0.90 %    Nucleated RBC 0.00 0.00 - 0.20 /100 WBC    Neutrophils (Absolute) 4.30 1.82 - 7.42 K/uL    Lymphs (Absolute) 4.49 1.00 - 4.80 K/uL    Monos (Absolute) 1.33 (H) 0.00 - 0.85 K/uL    Eos (Absolute) 0.55 (H) 0.00 - 0.51 K/uL    Baso (Absolute) 0.13 (H) 0.00 - 0.12 K/uL    Immature Granulocytes (abs) 0.03 0.00 - 0.11 K/uL    NRBC (Absolute) 0.00 K/uL   Hemoglobin  A1c    Collection Time: 03/10/24  8:28 AM   Result Value Ref Range    Glycohemoglobin 6.1 (H) 4.0 - 5.6 %    Est Avg Glucose 128 mg/dL   Prothrombin time (INR)    Collection Time: 03/10/24  8:28 AM   Result Value Ref Range    PT 13.7 12.0 - 14.6 sec    INR 1.04 0.87 - 1.13   APTT (PTT)    Collection Time: 03/10/24  8:28 AM   Result Value Ref Range    APTT 83.1 (H) 24.7 - 36.0 sec   COD (Adult)    Collection Time: 03/10/24  8:28 AM   Result Value Ref Range    ABO Grouping Only O     Rh Grouping Only POS     Antibody Screen-Cod NEG    ESTIMATED GFR    Collection Time: 03/10/24  8:28 AM   Result Value Ref Range    GFR (CKD-EPI) 100 >60 mL/min/1.73 m 2   EKG    Collection Time: 03/10/24 10:33 AM   Result Value Ref  Range    Report       [facility]    Test Date:  2024-03-10  Pt Name:    BEBO DELACRUZ              Department: 183  MRN:        [id number]                      Room:       T627  Gender:     Male                         Technician: JALEEL  :        1960                   Requested By:SHAE WANG  Order #:    [id number]                    Reading MD: Tyler Sal MD    Measurements  Intervals                                Axis  Rate:       62                           P:          16  UT:         164                          QRS:        -4  QRSD:       85                           T:          -47  QT:         401  QTc:        408    Interpretive Statements  Sinus rhythm  Inferior infarct, age indeterminate  Compared to ECG 2024 16:16:01  No significant changes  Electronically Signed On 03- 23:55:42 PDT by Tyler Sal MD     Urinalysis    Collection Time: 03/10/24 10:40 AM    Specimen: Urine, Clean Catch   Result Value Ref Range    Color Yellow     Character Clear     Specific Gravity 1.014 <1.035    Ph 5.5 5.0 - 8.0    Glucose Negative Negative mg/dL    Ketones Negative Negative mg/dL    Protein Negative Negative mg/dL    Bilirubin Negative Negative    Urobilinogen, Urine 1.0 Negative    Nitrite Negative Negative    Leukocyte Esterase Negative Negative    Occult Blood Negative Negative    Micro Urine Req see below    POCT glucose device results    Collection Time: 03/10/24 11:07 AM   Result Value Ref Range    POC Glucose, Blood 143 (H) 65 - 99 mg/dL   POCT glucose device results    Collection Time: 03/10/24  4:16 PM   Result Value Ref Range    POC Glucose, Blood 195 (H) 65 - 99 mg/dL   Heparin Anti-Xa    Collection Time: 03/10/24  9:06 PM   Result Value Ref Range    Heparin Xa (UFH) 0.24 IU/mL   POCT glucose device results    Collection Time: 03/10/24  9:12 PM   Result Value Ref Range    POC Glucose, Blood 85 65 - 99 mg/dL   CBC WITH DIFFERENTIAL    Collection Time: 24   1:00 AM   Result Value Ref Range    WBC 10.9 (H) 4.8 - 10.8 K/uL    RBC 3.98 (L) 4.70 - 6.10 M/uL    Hemoglobin 13.7 (L) 14.0 - 18.0 g/dL    Hematocrit 40.0 (L) 42.0 - 52.0 %    .5 (H) 81.4 - 97.8 fL    MCH 34.4 (H) 27.0 - 33.0 pg    MCHC 34.3 32.3 - 36.5 g/dL    RDW 49.2 35.9 - 50.0 fL    Platelet Count 228 164 - 446 K/uL    MPV 11.3 9.0 - 12.9 fL    Neutrophils-Polys 40.80 (L) 44.00 - 72.00 %    Lymphocytes 42.60 (H) 22.00 - 41.00 %    Monocytes 10.80 0.00 - 13.40 %    Eosinophils 4.20 0.00 - 6.90 %    Basophils 1.10 0.00 - 1.80 %    Immature Granulocytes 0.50 0.00 - 0.90 %    Nucleated RBC 0.00 0.00 - 0.20 /100 WBC    Neutrophils (Absolute) 4.43 1.82 - 7.42 K/uL    Lymphs (Absolute) 4.62 1.00 - 4.80 K/uL    Monos (Absolute) 1.17 (H) 0.00 - 0.85 K/uL    Eos (Absolute) 0.46 0.00 - 0.51 K/uL    Baso (Absolute) 0.12 0.00 - 0.12 K/uL    Immature Granulocytes (abs) 0.05 0.00 - 0.11 K/uL    NRBC (Absolute) 0.00 K/uL   Basic Metabolic Panel    Collection Time: 03/11/24  1:00 AM   Result Value Ref Range    Sodium 135 135 - 145 mmol/L    Potassium 3.7 3.6 - 5.5 mmol/L    Chloride 103 96 - 112 mmol/L    Co2 21 20 - 33 mmol/L    Glucose 172 (H) 65 - 99 mg/dL    Bun 13 8 - 22 mg/dL    Creatinine 0.82 0.50 - 1.40 mg/dL    Calcium 8.7 8.5 - 10.5 mg/dL    Anion Gap 11.0 7.0 - 16.0   ESTIMATED GFR    Collection Time: 03/11/24  1:00 AM   Result Value Ref Range    GFR (CKD-EPI) 98 >60 mL/min/1.73 m 2   POCT glucose device results    Collection Time: 03/11/24  5:39 AM   Result Value Ref Range    POC Glucose, Blood 115 (H) 65 - 99 mg/dL   POCT arterial blood gas device results    Collection Time: 03/11/24 12:30 PM   Result Value Ref Range    Ph 7.478 7.400 - 7.500    Pco2 30.5 26.0 - 37.0 mmHg    Po2 189 (H) 64 - 87 mmHg    Tco2 23 20 - 33 mmol/L    S02 100 (H) 93 - 99 %    Hco3 22.6 17.0 - 25.0 mmol/L    BE 0 -4 - 3 mmol/L    Body Temp 36.5 C degrees    O2 Therapy 100 %    iPF Ratio 189     Ph Temp Manuela 7.485 7.400 - 7.500     Pco2 Temp Co 29.8 26.0 - 37.0 mmHg    Po2 Temp Cor 187 (H) 64 - 87 mmHg    Specimen Arterial     Segundo Test N/A     DelSys Vent     End Tidal Carbon Dioxide 23 mmhg    Peep End Expiratory Pressure 8 cmh20    Percent Minute Volume 160     Mode ASV    POCT sodium device results    Collection Time: 24 12:30 PM   Result Value Ref Range    Istat Sodium 138 135 - 145 mmol/L   POCT potassium device results    Collection Time: 24 12:30 PM   Result Value Ref Range    Istat Potassium 3.9 3.6 - 5.5 mmol/L   POCT ionized CA device results    Collection Time: 24 12:30 PM   Result Value Ref Range    Istat Ionized Calcium 1.21 1.10 - 1.30 mmol/L       Imaging  DX-CHEST-2 VIEWS   Final Result      No evidence of acute cardiopulmonary process.      US-VEIN MAPPING LOWER EXTREMITY BILAT   Final Result      US-CAROTID DOPPLER BILAT   Final Result      EC-ECHOCARDIOGRAM COMPLETE W/ CONT   Final Result      DX-CHEST-PORTABLE (1 VIEW)   Final Result      No acute cardiopulmonary abnormality.      CL-LEFT HEART CATHETERIZATION WITH POSSIBLE INTERVENTION    (Results Pending)   DX-CHEST-PORTABLE (1 VIEW)    (Results Pending)   EC-OH W/O CONT    (Results Pending)       Assessment/Plan  * Encounter for weaning from ventilator (HCC)  Assessment & Plan  Intubated electively with grade 1 airway 3/11  Excellent ventilator mechanics, waveforms reviewed  ASV weaning protocol when anesthesia wears off  SAT/SBT as clinically appropriate  CXR postop: Pending release by radiology, looks good per anesthesia  First AB.42/37/247  Reduce ASV from 160 to 150%, titrate FiO2  Serial ABG  SBT when clinically appropriate    S/P CABG x 3  Assessment & Plan  Elective three-vessel CABG 3/11 by Dr. Chapman  Actively titrating norepinephrine for hypotension  Transition to clevidipine as needed for hypertension  Goal BP   Monitoring CVP, appears dry may need Plasma-Lyte bolus 1 L  Wires present and tested, has not required  pacing  Mediastinal chest tubes with small airleak as per CVS  High platelets and anticoagulation when okay with CVS  IV narcotics for analgesia  Dexmedetomidine infusion for sedation  Forced diuresis as clinically appropriate  GDMT    ACS (acute coronary syndrome) (HCC)- (present on admission)  Assessment & Plan  Admitted with NSTEMI/ACS  Three-vessel disease by angiogram  CABG three-vessel 3/11  GDMT    Type 2 diabetes mellitus, without long-term current use of insulin (HCC)- (present on admission)  Assessment & Plan  Continuous insulin infusion protocol x 24 hours post CABG  Every hour glucose levels and titrate  Hypoglycemia protocols  Transition to SSI/glargine tomorrow if clinically appropriate  Continue to hold metformin    Primary hypertension- (present on admission)  Assessment & Plan  Hold home regimen for now  Likely Rx with BB and ACE postoperatively when hemodynamically appropriate    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  Reviewed prior to discharge from hospital    Hypothyroid- (present on admission)  Assessment & Plan  Resume home levothyroxine as clinically appropriate  This recent TSH borderline depressed at 0.29, follow-up as an outpatient   If develops A-fib RVR will check free T3 and T4 levels and repeat TSH    Hyperlipidemia- (present on admission)  Assessment & Plan  Resume atorvastatin postop as clinically appropriate  Appears to be at ideal body weight    History of Hodgkin's lymphoma- (present on admission)  Assessment & Plan  History of        Discussed patient condition and risk of morbidity and/or mortality with RN, RT, Pharmacy, Code status disscussed, CVS, and cardiac anesthesia .    The patient remains critically ill.  Critical care time = 45 minutes in directly providing and coordinating critical care and extensive data review.  No time overlap and excludes procedures.

## 2024-03-11 NOTE — CARE PLAN
The patient is Watcher - Medium risk of patient condition declining or worsening    Shift Goals  Clinical Goals: extubate, mobilize EOB  Patient Goals: jhony  Family Goals: jhony    Progress made toward(s) clinical / shift goals:    Problem: Pain - Standard  Goal: Alleviation of pain or a reduction in pain to the patient’s comfort goal  Outcome: Progressing  Note: CPOT 0     Problem: Day of surgery post CABG/Heart valve replacement  Goal: Stabilization in immediate post op period  Outcome: Progressing  Intervention: VS q 15 min x 4 hours, then q 1 hour. Include temperature immediately upon arrival. Check CO/CI q 2-4 hours and PRN  Note: Vital signs completed per protocol. No swan in place for CO/CI.   Intervention: If radial artery used, elevate arm, no BP checks or needle sticks from affected arm, monitor ulnar pulse and capillary refill  Note: N/A  Intervention: First post op hour labs and EKG per order  Note: Labs and EKG collected per order  Intervention: Serum K q 6 hours x 24 hours.  ABG and CBC prn.  Note: K 3.9 and 4.0, 10 meq K ordered each per protocol   Intervention: Initiate post cardiac insulin infusion protocol orders for FSBS greater than 140 and check frequency per protocol  Note: Insulin gtt continued upon arrival to unit. Initial .  Intervention: FSBS frequency as per Cardiac Surgery Insulin Drip Protocol  Note: FSBG Q 1 hour while on insulin gtt  Intervention: Chest tube to 20 cm suction, record CT drainage with VS, and check for air leak  Note: CT set up to suction. Air leak noted. Dr. Billy and Dr. Chapman notified.   Intervention: For CT drainage >300 mL in first hour post op and/or 150 mL in subsequent hours: Stat platelets, PT, INR, TEG, iSTAT, and H&H per order  Note: CT output WNL.   Total CT output: 94  Intervention: Titrate and wean off vasoactive drips per patient's condition and per MD order while maintaining SBP  mmHg per MD order  Note: Q15 BP while pt on vasoactive  medications. Goal SBP   Intervention: VAP protocol in place  Note: HOB >30 degrees, oral care, sedation vacation  Intervention: Wean from Vent per protocol (see protocol), extubation goal within 6 hours post op  Note: Extubated at: 1741  Intervention: IS q 1 hour while awake post extubation  Note: Baseline IS 2750  New IS: 1000    Intervention: Bedrest until extubated and groin lines out  Note: Completed.   Intervention: Maintain all original surgical dressings per provider orders and specifications  Note: Midline island dressing and bilateral EVH dressings in place until 1220 3/12.   Intervention: Clear liquids post extubation, order carbohydrate free (post cardiac surgery) diet, advance as tolerated  Note: Diet ordered  Intervention: Discontinue [place] pedrito and arterial line 12-18 hours post op if hemodynamically stable and off vasoactive drips  Note: N/A  Intervention: A-Fib and DVT prophylaxis per MD order or contraindications documented (refer to DVT/VTE problem on Care Plan)  Note: Pt in SR  Intervention: Amiodarone protocol per MD order  Note: Pt in SR        Patient is not progressing towards the following goals:

## 2024-03-11 NOTE — PROGRESS NOTES
Report given to ICU RN. All questions answered. Heparin verified at 16units/kg/hr. Updated RN on next aptt draw at 1956. Pt removed from tele box, monitors called. All patient belongings with patient. Pt being wheeled down with ACLS RNS.

## 2024-03-11 NOTE — ASSESSMENT & PLAN NOTE
Elective three-vessel CABG 3/11 by Dr. Chapman  Actively titrating norepinephrine for hypotension  Transition to clevidipine as needed for hypertension  Goal BP   Monitoring CVP  V Wires removed  Mediastinal chest tubes removed  Anticoagulation/antiplatelets as per CVS postop  Dexmedetomidine infusion for sedation weaned off  Lovenox okayed by CVS  Forced diuresis as clinically appropriate-IV furosemide to optimize electrolytes  GDMT

## 2024-03-11 NOTE — ANESTHESIA TIME REPORT
Anesthesia Start and Stop Event Times       Date Time Event    3/11/2024 0703 Ready for Procedure     0720 Anesthesia Start          Responsible Staff  03/11/24      Name Role Begin End    Irving Mendosa M.D. Anesth 0720           Overtime Reason:  no overtime (within assigned shift)    Comments:

## 2024-03-11 NOTE — PROGRESS NOTES
Pt opening eyes to voice and ASHRAF to commands. Nodding appropriately. 3/5 strength in all extremities.

## 2024-03-11 NOTE — ANESTHESIA PROCEDURE NOTES
Arterial Line    Performed by: Irving Mendosa M.D.  Authorized by: Irving Mendosa M.D.    Start Time:  3/11/2024 7:27 AM  Localization: surface landmarks    Patient Location:  OR  Indication: continuous blood pressure monitoring        Catheter Size:  20 G  Seldinger Technique?: Yes    Laterality:  Right  Site:  Radial artery  Line Secured:  Antimicrobial disc, tape and transparent dressing  Events: patient tolerated procedure well with no complications     Placed with one attempt

## 2024-03-11 NOTE — ASSESSMENT & PLAN NOTE
Admitted with NSTEMI/ACS  Three-vessel disease by angiogram  CABG three-vessel 3/11  GDMT  Statin/DAPT-cardiac rehab after discharge  Reviewed risk factor modification

## 2024-03-11 NOTE — PROGRESS NOTES
4 Eyes Skin Assessment Completed by SANTOSH Kaur and SANTOSH Quiñonez.    Head WDL  Ears WDL  Nose WDL  Mouth WDL  Neck WDL  Breast/Chest WDL, intermittent redness from tape/ EKG  leads  Shoulder Blades WDL  Spine WDL  (R) Arm/Elbow/Hand bruising  (L) Arm/Elbow/Hand WDL  Abdomen WDL, scar  Groin Redness and Blanching  Scrotum/Coccyx/Buttocks WDL  (R) Leg WDL  (L) Leg WDL  (R) Heel/Foot/Toe WDL  (L) Heel/Foot/Toe WDL          Devices In Places ECG, Tele Box, Blood Pressure Cuff, and Pulse Ox      Interventions In Place Low Air Loss Mattress    Possible Skin Injury No    Pictures Uploaded Into Epic N/A  Wound Consult Placed N/A  RN Wound Prevention Protocol Ordered No

## 2024-03-11 NOTE — OP REPORT
Operative report    PreOp Diagnosis: Non-STEMI, multivessel coronary artery disease, type 2 diabetes mellitus, hypertension, dyslipidemia, history of Hodgkin's lymphoma with chest radiation, status postsplenectomy      PostOp Diagnosis: Same as above      Procedure(s):  CABG X4 WITH SKELETONIZED TRUONG TO LAD, RSVG TO DIAG 2, OM2, AND RPLB   ENDOSCOPIC VEIN PROCUREMENT OF LEFT GREATER SAPHENOUS VEIN- Wound Class: Clean with Drain  ECHOCARDIOGRAM, TRANSESOPHAGEAL, INTRAOPERATIVE - Wound Class: Clean Contaminated    Surgeon(s):  Salas Chapman D.O.    Anesthesiologist/Type of Anesthesia:  Anesthesiologist: Irving Mendosa M.D./General    Surgical Staff:  Assistant: NEL Rangel; Chip Johnson P.A.-C.  Circulator: Beryl Salas R.N.; Pilar Brown R.N.  Perfusionist: Jacob Kauffman  Scrub Person: Nadja Ruggiero    Specimens removed if any:  * No specimens in log *    Estimated Blood Loss: Cardiopulmonary bypass    Findings:     Pre and postoperative echo showed normal wall motion and ejection fraction with no significant valvular abnormalities.    All targets were greater than 2 mm disc size except for the OM 2 branch which was all targets were greater than 2 mm disc size except for the OM 2 branch which was 1.25 to 1-1/2 mm in size.  All of them were good targets with proximal calcification excluding the LAD which was noted to be quite thick-walled given its chronic occluded status.    All grafts had brisk flow noted distal in the anastomosis as well and sidebranches proximally.  Vein graft flows were checked with cardioplegia instillation at 150 mmHg of pressure.  Posterior lateral branch had a flow 70 mL minute, on 250 mL/min, diagonal 200 and mL per minute.    The right greater saphenous vein was initially attempted for harvest, but was found to be an unsuitable.  Left was harvested and full.  Mammary artery was 2 mm size or greater proximally.  And had excellent flow.    Patient did not require  defibrillation to resume a normal sinus rhythm, and  from bypass without any issue.    Complications: None immediate    Patient condition: Guarded to ICU    Chest tubes: Mediastinal: 32 Kinyarwanda x2    Endoscopic vein harvest: Left greater saphenous vein.    Pacing wires: RV x 2    Pericardium: Open    Cross-clamp time: 92 minutes    Pump time: 112 minutes    Cardioplegia: Cold blood antegrade [place] cardioplegia given.  Initial induction with 1000 mL.  300 mL of warm blood given as an antegrade hotshot    Vent: Aortic root      Procedure:    After informed consent was obtained, the patient was brought to the operating room.  They were placed in supine position on the operating table.  General anesthesia was induced via endotracheal tube.  Lines, arterial line, Baca were placed by the nursing and anesthesia teams.  They received pre-incision antibiotics and beta-blockade.  The patient was prepped in a sterile fashion from their chin to their feet.  Drapes were placed in a sterile fashion.  The procedure was begun with a timeout.    I made a sternotomy incision with a 10 blade scalpel.  Concurrently to this, MING Keenan harvested the saphenous vein in the usual fashion.  Once removed from the leg, the leg was closed in standard fashion.  The vein was then prepped on the back table.  Afterwards, MING Mcmahan, who was proctored by Monty Gallego, assisted in the rest of the case.     I deepened my sternal incision with left cautery to the sternum.  The sternum was divided in the midline using the sternal saw.  Hemostasis of the sternal edges was obtained using bone putty and electrocautery.  The mammary retractor was brought up at that point and the left hemisternum was elevated.  Using the electrocautery with occasional clips for side branches, the mammary was dissected down from its takeoff at the subclavian down to its bifurcation into the muscular phrenic and inferior epigastric, in a skeletonized  fashion.    Once the mammary was completely dissected out, systemic heparinization was performed, using 400 units/kg of heparin, to achieve an ACT greater than 480 seconds. The mammary was ligated distally and divided.  There was excellent flow noted.  I placed a small clip at the distal end of the mammary, treated it with papaverine, and placed it back in the left chest for safe keeping.  The distal stump was secured.  The mammary retractor was removed and sternal retractor was placed in the chest open.    With the chest open, I then dissected out the innominate vein and the pericardium.  I then opened the pericardium and inverted T fashion.  I created pericardial well, suspending the pericardial edges from the skin edges using interrupted 2-0 Ethibond sutures.  Inspection of the heart and the aorta revealed no obvious pathology.  Palpation of the aorta revealed no calcifications precluding cannulation or cross-clamp.  I cannulated the aorta using a direct Seldinger's technique and OH guidance.    Next, I placed the venous cannula into the IVC via the right atrium.  Again this was confirmed with OH.  Next, I then placed a pursestring on the mid ascending aorta, through which I placed my antegrade needle.      With my cannulas in place, I then inspected the vein.  It was of reasonable quality.  The distal end was spatulated and it was marked to help with orienting the graft.  Cardiopulmonary bypass was then initiated.    Once on full flow, I then retracted the heart to inspect my targets.  They are noted above.  These were each marked with the [place] blade.  I then dissected out the IVC and brought the heart strep through the oblique sinus followed by running it through the transverse sinus.  The heart strep was used throughout the case to help position the heart.  Finally, I dissected the pulmonary artery away from the distal portion of the aorta to facilitate cross-clamp placement and complete isolation of the  heart.  I then placed a cross-clamp and the heart was arrested.  Ice slush was placed on the heart to assist with cooling.    After I had arrest, the heart was repositioned to expose the inferior wall.  The posterior lateral branch was found to be the best target.   dissected this out with the Logan blade.  I then opened it.  An end vein to side artery anastomosis was created with a running 7-0 Prolene suture.  The vein graft was connected to the cardioplegia system, and cardioplegia was given as I tied down the anastomosis.  There was excellent flow noted and good hemostasis.  Hand-injection of vein solution was performed to distend the vein graft as I brought it around the right atrium, to measure it to the aorta.  I then divided it proximally.  The proximal portion was then spatulated as well.  Cardioplegia was given down the aortic root, and I created an aortotomy using 11 blade scalpel and a 4 mm aortic punch.  The proximal anastomosis was then created using a running 6-0 Prolene suture in an end vein to side aorta fashion.  It was also marked with a coronary marker.    The heart was then repositioned to expose the OM 2 branch.  It was noted be on the smaller side but graftable.  I dissected this out with the Logan blade, and then opened it. An end vein to side artery anastomosis was created with a running 7-0 Prolene suture.  Upon completion, the vein graft was connected to the cardioplegia circuit and cardioplegia given as I tied down the anastomosis.  Excellent flow was noted and there was good hemostasis.  I then used vein solution to distend the vein and measured out to the aorta.  I divided it proximally and spatulated it for creation of an another anastomosis.  The aorta was once again distended with cardioplegia, and I created an aortotomy with 11 blade scalpel and 4 mm punch.  An end vein to side aorta anastomosis was created with a running 6-0 Prolene suture.  Upon completion cardioplegia was given  in both proximal anastomosis were found to be hemostatic.  The proximal was also marked with a coronary marker.      The heart was repositioned on top of 2 cold rolled sponges, after removing the heart strap.  The diagonal 2 was identified and opened longitudinally.  Vein graft was also anastomosed there in the usual fashion with a running 7-0 Prolene suture.  Testing was done with hand-injection of saline as well as with cardioplegia instillation of the graft.  It had excellent flow noted.  It was measured to the aorta where another aortotomy was created and prepped for anastomosis.  As I was anastomosing it down, I noted that the lie of the graft was tighter than I thought was reasonable.  I therefore used a remaining segment of vein graft and performed a jump from the graft in order to get enough length to have a nice layer of the graft to the aorta.  If the part that was initially being anastomose down was then ligated with 3 medium clips and resected.  The new jump graft was tested and found to be hemostatic with excellent flow.  It was then anastomosed to the aorta with a running 6-0 Prolene suture and marked with a coronary marker.  Rewarming of the patient was begun    The heart was repositioned again to expose the LAD.  I dissected this out with a Dubois blade and opened it with 11 blade scalpel.  I extended the arteriotomy with Sanchez scissors.  I then created a keyhole defect in the pericardium through which I brought my mammary pedicle.  I then sized it to the site of the anastomosis and divided mammary pedicle, discarding the distal portion.  I then fashioned the distal end of the mammary.  An end LIMA to side LAD anastomosis was created with a running 7-0 Prolene suture.  Upon completion, the pedicle was opened and flow was noted to run distal.  It was also noted to be hemostatic.  Bulldog clamp was reapplied and I tacked the pedicle to the epicardium with interrupted 6-0 Prolene sutures.    Patient was  de-aired, flows dropped, and cross-clamp removed.  As the heart reperfused the placement of right ventricular pacing leads and my mediastinal chest tubes.  They were brought out through the epigastrium in the usual fashion.  Surgical sites were checked and found to be hemostatic.  The patient was then  from bypass in the usual fashion.  Venous cannula and aortic root vent were removed.  Protamine was administered to reverse systemic heparinization.  As the protamine was administered, the patient's blood volume in the pump was administered back to the aortic cannula.  Once that was given back, that cannula was removed.  Sites were oversewn as needed.  Hemostasis was verified to be good.  Proceeded with closure of the sternum using interrupted #5 sternal wires.  The wound was then irrigated and closed in layers using absorbable sutures.  The patient tolerated the procedure well, all instrument counts were correct x2, and he was taken to the ICU in guarded condition.          3/11/2024 11:53 AM Salas Chapman D.O.

## 2024-03-11 NOTE — ASSESSMENT & PLAN NOTE
Continuous insulin infusion protocol x 24 hours post CABG  Transitioned to SSI/glargine as needed  Glucose levels controlled  Continue to hold metformin  Glycohemoglobin 6.1

## 2024-03-11 NOTE — ASSESSMENT & PLAN NOTE
Resume home levothyroxine, clinically appears euthyroid  Recent TSH borderline depressed at 0.29, follow-up as an outpatient   Free T3 and T4 levels noted, follow-up with primary MD

## 2024-03-11 NOTE — ANESTHESIA PROCEDURE NOTES
Airway    Date/Time: 3/11/2024 7:31 AM    Performed by: Irving Mendosa M.D.  Authorized by: Irving Mendosa M.D.    Location:  OR  Urgency:  Elective  Indications for Airway Management:  Anesthesia      Spontaneous Ventilation: absent    Sedation Level:  Deep  Preoxygenated: Yes    Patient Position:  Sniffing  Mask Difficulty Assessment:  1 - vent by mask  Final Airway Type:  Endotracheal airway  Final Endotracheal Airway:  ETT  Cuffed: Yes    Technique Used for Successful ETT Placement:  Direct laryngoscopy    Insertion Site:  Oral  Blade Type:  Joya  Laryngoscope Blade/Videolaryngoscope Blade Size:  4  ETT Size (mm):  8.0  Measured from:  Teeth  ETT to Teeth (cm):  23  Placement Verified by: auscultation and capnometry    Cormack-Lehane Classification:  Grade I - full view of glottis  Number of Attempts at Approach:  1

## 2024-03-11 NOTE — ANESTHESIA PROCEDURE NOTES
OH    Date/Time: 3/11/2024 7:49 AM    Performed by: Irving Mendosa M.D.  Authorized by: Irving Mendosa M.D.    Start Time:3/11/2024 7:49 AM  Preanesthetic Checklist: patient identified, IV checked, site marked, risks and benefits discussed, surgical consent, monitors and equipment checked, pre-op evaluation and timeout performed    Indication for OH: diagnostic   Patient Location: OR  Intubated: Yes  Bite Block: Yes  Heart Visualized: Yes  Insertion: atraumatic    **See FULL OH report in patient's chart via CV Synapse**

## 2024-03-11 NOTE — CARE PLAN
The patient is Stable - Low risk of patient condition declining or worsening    Shift Goals  Clinical Goals: prep for OR  Patient Goals: rest/sleep  Family Goals: updates    Progress made toward(s) clinical / shift goals:    Problem: Pre Op  Goal: Optimal preparation for CABG/Heart Valve surgery  Outcome: Progressing  Intervention: Pre Op education to patient/significant other. Provide patient [facility] Patient Guideline for Cardiac Surgery (See Pt. Ed.)  Note: Completed by SANTOSH Linder  Intervention: Consents obtained for Surgery, Anesthesia and Transfusion/Bloodless Program Participant  Note: Blood and surgery consents signed. Anesthesia printed out  Intervention: Beta blocker, gabapentin and Tylenol given at least 2 hours prior to surgery  Note: Metoprolol to be given this AM  Intervention: If diabetic, administer insulin night before surgery  Note: No insulin required, BS 85  Intervention: Chart back and consents sent to surgery with patient  Note: Consents printed out, surgical and blood consent signed  Intervention: Transport to surgery with cardiac monitor, oxygen, and ensure hand off report is given to pre op RN  Note: To be completed this AM

## 2024-03-11 NOTE — PROGRESS NOTES
Patient into room at 1230 with OR staff. Intensivist Dr. Billy, RT and RN at bedside, report received from anesthesiologist Dr. Mendosa. All gtts, lines and devices verified. Chest tubes hooked up to suction. Air leak noted, Dr. Billy and Dr. Chapman notified. Vital signs stable.

## 2024-03-11 NOTE — ASSESSMENT & PLAN NOTE
Hold home regimen for now  Continue metoprolol   Titrating clevidipine as needed  Consider adding ACE inhibitor as hemodynamics allow

## 2024-03-11 NOTE — ANESTHESIA PREPROCEDURE EVALUATION
" Case: [id number] Date/Time: 03/11/24 0715    Procedures:       CABG, 2 OR MORE VESSELS x3      CABG, WITH ENDOSCOPIC VEIN PROCUREMENT      ECHOCARDIOGRAM, TRANSESOPHAGEAL, INTRAOPERATIVE    Anesthesia type: General    Location: [place] [address] / SURGERY [facility]    Surgeons: Salas Chapman D.O.             64 y/o M admitted 3/7/2024 with NSTEMI   PMH of hyperlipidemia, type 2 diabetes mellitus, hypertension referred from outside facility for NSTEMI. Had mildly elevated troponin.  Initiated on heparin drip for ACS.  Echocardiogram with ejection fraction 70%, mild mitral regurgitation.  S/p cardiac catheterization which revealed multivessel disease.  CTS evaluated and scheduled for CABG on 3/11/2024.    Past Medical History:   Diagnosis Date   • Diabetes (HCC)    • Hypertension        Past Surgical History:   Procedure Laterality Date   • SPLENECTOMY         Current Outpatient Medications   Medication Instructions   • allopurinol (ZYLOPRIM) 300 mg, Oral, DAILY, IN THE AFTERNOON<BR>   • Alpha-Lipoic Acid 300 mg, Oral, DAILY, IN THE AFTERNOON<BR>   • atorvastatin (LIPITOR) 10 mg, Oral, DAILY, IN THE AFTERNOON<BR>   • CYANOCOBALAMIN PO 1 Tablet, Oral, EVERY MORNING   • lactulose (CONSTULOSE) 10 g, Oral, EVERY BEDTIME   • levothyroxine (SYNTHROID) 125 mcg, Oral, EVERY MORNING   • lisinopril (PRINIVIL) 10 mg, Oral, EVERY MORNING   • Magnesium 250 mg, Oral, EVERY MORNING   • metFORMIN (GLUCOPHAGE) 500 mg, Oral, EVERY MORNING   • Multiple Vitamin (ONE-DAILY MULTIVITAMINS) Tab 1 Tablet, Oral, DAILY, IN THE AFTERNOON<BR>   • tamsulosin (FLOMAX) 0.4 mg, Oral, DAILY, IN THE AFTERNOON<BR>       /66   Pulse 70   Temp 37 °C (98.6 °F) (Temporal)   Resp 12   Ht 1.778 m (5' 10\")   Wt 76.4 kg (168 lb 6.9 oz)   SpO2 95%     No Known Allergies    Lab Results   Component Value Date/Time    SODIUM 135 03/11/2024 01:00 AM    POTASSIUM 3.7 03/11/2024 01:00 AM    CHLORIDE 103 03/11/2024 01:00 AM    CO2 21 03/11/2024 01:00 AM "    GLUCOSE 172 (H) 03/11/2024 01:00 AM    BUN 13 03/11/2024 01:00 AM    CREATININE 0.82 03/11/2024 01:00 AM        Lab Results   Component Value Date/Time    WBC 10.9 (H) 03/11/2024 01:00 AM    RBC 3.98 (L) 03/11/2024 01:00 AM    HEMOGLOBIN 13.7 (L) 03/11/2024 01:00 AM    HEMATOCRIT 40.0 (L) 03/11/2024 01:00 AM    .5 (H) 03/11/2024 01:00 AM    MCH 34.4 (H) 03/11/2024 01:00 AM    MCHC 34.3 03/11/2024 01:00 AM    MPV 11.3 03/11/2024 01:00 AM    NEUTSPOLYS 40.80 (L) 03/11/2024 01:00 AM    LYMPHOCYTES 42.60 (H) 03/11/2024 01:00 AM    MONOCYTES 10.80 03/11/2024 01:00 AM    EOSINOPHILS 4.20 03/11/2024 01:00 AM    BASOPHILS 1.10 03/11/2024 01:00 AM      ECHOCARDIOGRAM 3/7/24:  No prior study is available for comparison.   The left ventricle is normal in size and thickness.  The left ventricular ejection fraction is visually estimated to be 70%.  Reduced right ventricular systolic function.  Mild mitral regurgitation.  Normal inferior vena cava size and inspiratory collapse.     CARDIAC CATHETERIZATION 3/8/24:  The left main coronary artery has luminal disease and bifurcates into the left anterior descending and left circumflex coronary arteries.  The left anterior descending coronary artery is a large, calcified, transapical vessel with proximal 30% disease, mid 70% disease, and then a mid chronic total occlusion just after the takeoff of a large second diagonal branch..  It supplies a small first diagonal branch and a large second diagonal branch with diffuse mild disease.  The distal vessel refills primarily by left to left collaterals from the second diagonal branch and faint collaterals from the right coronary artery distribution.  The left circumflex coronary artery is a small, nondominant vessel with ostial 30% disease, proximal luminal irregularities, and mid 70% disease at the bifurcation of the second obtuse marginal branch.  It supplies three small obtuse branches with diffuse mild disease.  The right  coronary artery is a large, heavily calcified, dominant vessel with severe calcific proximal 95% that appears to have some degree of associated thrombus, a long segment of calcific mid 50% disease, and distal luminal irregularities.  Distally, it bifurcates into a moderate posterior descending coronary and a large posterolateral branch with luminal irregularities.    Relevant Problems   CARDIAC   (positive) Primary hypertension      ENDO   (positive) Hypothyroid   (positive) Type 2 diabetes mellitus, without long-term current use of insulin (HCC)       Physical Exam    Airway   Mallampati: II  TM distance: >3 FB  Neck ROM: full       Cardiovascular - normal exam  Rhythm: regular  Rate: normal  (-) murmur     Dental - normal exam             Pulmonary - normal exam  Breath sounds clear to auscultation     Abdominal    Neurological - normal exam                 Anesthesia Plan    ASA 4   ASA physical status 4 criteria: MI - recent (< 3 months)    Plan - general       Airway plan will be ETT  OH Planned  (Arterial and central lines)      Induction: intravenous    Postoperative Plan: Postoperative administration of opioids is intended.    Pertinent diagnostic labs and testing reviewed    Informed Consent:    Anesthetic plan and risks discussed with patient.    Use of blood products discussed with: patient whom consented to blood products.     Patient interviewed and procedure with anesthetic risks were discussed in detail.  Risks include but are not limited to prolonged intubation with ICU admission, invasive lines, PONV, pain, awareness, allergic reaction, sore throat, injury to teeth/lip/gums, esophageal or vocal cord injury, positioning injury, and/or cardiopulmonary problems up to and including death.  All questions were answered to satisfaction and patient consents to proceed as plan above.

## 2024-03-11 NOTE — ASSESSMENT & PLAN NOTE
Intubated electively with grade 1 airway 3/11  Extubated 3/11  Mild hypoxemia on low-flow nasal cannula O2  RT/O2 protocols  Mobilize as dysrhythmias allow-encouraged  Incentive spirometry encouraged 1500 cc finally  Forced diuresis as clinically prudent  Weaned to room air, WOB low, sats normal

## 2024-03-11 NOTE — ANESTHESIA PROCEDURE NOTES
Central Venous Line    Performed by: Irving Mendosa M.D.  Authorized by: Irving Mendosa M.D.    Start Time:  3/11/2024 7:40 AM  Patient Location:  OR  Indication: central venous access and hemodynamic monitoring        provider hand hygiene performed prior to central venous catheter insertion, all 5 sterile barriers used (gloves, gown, cap, mask, large sterile drape) during central venous catheter insertion and skin prep agent completely dried prior to procedure    Patient Position:  Trendelenburg  Site:  Subclavian  Prep:  Chlorhexidine  Catheter Size:  7 Fr  Catheter Length (cm):  20  Number of Lumens:  Triple lumen  target vein identified, needle advanced into vein and blood aspirated and guidewire advanced into vein    Seldinger Technique?: Yes    Ultrasound-Guided: surface landmarks    Intravenous Verification: venous blood return and chest x-ray pending    all ports aspirated, all ports flushed easily, guidewire was removed intact, biopatch was applied, line was sutured in place and dressing was applied    Events: patient tolerated procedure well with no complications     Placed with two attempts. No arterial, no air.

## 2024-03-12 ENCOUNTER — APPOINTMENT (OUTPATIENT)
Dept: RADIOLOGY | Facility: MEDICAL CENTER | Age: 64
DRG: 232 | End: 2024-03-12
Attending: NURSE PRACTITIONER
Payer: COMMERCIAL

## 2024-03-12 LAB
ANION GAP SERPL CALC-SCNC: 15 MMOL/L (ref 7–16)
BUN SERPL-MCNC: 10 MG/DL (ref 8–22)
CALCIUM SERPL-MCNC: 7.7 MG/DL (ref 8.5–10.5)
CHLORIDE SERPL-SCNC: 107 MMOL/L (ref 96–112)
CO2 SERPL-SCNC: 18 MMOL/L (ref 20–33)
CREAT SERPL-MCNC: 0.8 MG/DL (ref 0.5–1.4)
EKG IMPRESSION: NORMAL
ERYTHROCYTE [DISTWIDTH] IN BLOOD BY AUTOMATED COUNT: 51.8 FL (ref 35.9–50)
GFR SERPLBLD CREATININE-BSD FMLA CKD-EPI: 99 ML/MIN/1.73 M 2
GLUCOSE BLD STRIP.AUTO-MCNC: 138 MG/DL (ref 65–99)
GLUCOSE BLD STRIP.AUTO-MCNC: 139 MG/DL (ref 65–99)
GLUCOSE BLD STRIP.AUTO-MCNC: 144 MG/DL (ref 65–99)
GLUCOSE BLD STRIP.AUTO-MCNC: 148 MG/DL (ref 65–99)
GLUCOSE BLD STRIP.AUTO-MCNC: 149 MG/DL (ref 65–99)
GLUCOSE BLD STRIP.AUTO-MCNC: 152 MG/DL (ref 65–99)
GLUCOSE BLD STRIP.AUTO-MCNC: 159 MG/DL (ref 65–99)
GLUCOSE BLD STRIP.AUTO-MCNC: 160 MG/DL (ref 65–99)
GLUCOSE BLD STRIP.AUTO-MCNC: 163 MG/DL (ref 65–99)
GLUCOSE BLD STRIP.AUTO-MCNC: 169 MG/DL (ref 65–99)
GLUCOSE BLD STRIP.AUTO-MCNC: 179 MG/DL (ref 65–99)
GLUCOSE SERPL-MCNC: 154 MG/DL (ref 65–99)
HCT VFR BLD AUTO: 36 % (ref 42–52)
HGB BLD-MCNC: 12.2 G/DL (ref 14–18)
MCH RBC QN AUTO: 34.8 PG (ref 27–33)
MCHC RBC AUTO-ENTMCNC: 33.9 G/DL (ref 32.3–36.5)
MCV RBC AUTO: 102.6 FL (ref 81.4–97.8)
PLATELET # BLD AUTO: 153 K/UL (ref 164–446)
PMV BLD AUTO: 11.8 FL (ref 9–12.9)
POTASSIUM SERPL-SCNC: 4.2 MMOL/L (ref 3.6–5.5)
POTASSIUM SERPL-SCNC: 4.2 MMOL/L (ref 3.6–5.5)
RBC # BLD AUTO: 3.51 M/UL (ref 4.7–6.1)
SODIUM SERPL-SCNC: 140 MMOL/L (ref 135–145)
WBC # BLD AUTO: 14 K/UL (ref 4.8–10.8)

## 2024-03-12 PROCEDURE — 99233 SBSQ HOSP IP/OBS HIGH 50: CPT | Performed by: INTERNAL MEDICINE

## 2024-03-12 PROCEDURE — 700102 HCHG RX REV CODE 250 W/ 637 OVERRIDE(OP): Performed by: THORACIC SURGERY (CARDIOTHORACIC VASCULAR SURGERY)

## 2024-03-12 PROCEDURE — 71045 X-RAY EXAM CHEST 1 VIEW: CPT

## 2024-03-12 PROCEDURE — 80048 BASIC METABOLIC PNL TOTAL CA: CPT

## 2024-03-12 PROCEDURE — 84132 ASSAY OF SERUM POTASSIUM: CPT

## 2024-03-12 PROCEDURE — 700102 HCHG RX REV CODE 250 W/ 637 OVERRIDE(OP): Performed by: NURSE PRACTITIONER

## 2024-03-12 PROCEDURE — A9270 NON-COVERED ITEM OR SERVICE: HCPCS | Performed by: NURSE PRACTITIONER

## 2024-03-12 PROCEDURE — 700111 HCHG RX REV CODE 636 W/ 250 OVERRIDE (IP): Mod: JZ | Performed by: NURSE PRACTITIONER

## 2024-03-12 PROCEDURE — 700102 HCHG RX REV CODE 250 W/ 637 OVERRIDE(OP): Mod: JZ

## 2024-03-12 PROCEDURE — 93010 ELECTROCARDIOGRAM REPORT: CPT | Performed by: INTERNAL MEDICINE

## 2024-03-12 PROCEDURE — 700111 HCHG RX REV CODE 636 W/ 250 OVERRIDE (IP): Performed by: THORACIC SURGERY (CARDIOTHORACIC VASCULAR SURGERY)

## 2024-03-12 PROCEDURE — 97535 SELF CARE MNGMENT TRAINING: CPT

## 2024-03-12 PROCEDURE — 94669 MECHANICAL CHEST WALL OSCILL: CPT

## 2024-03-12 PROCEDURE — 85027 COMPLETE CBC AUTOMATED: CPT

## 2024-03-12 PROCEDURE — 700111 HCHG RX REV CODE 636 W/ 250 OVERRIDE (IP)

## 2024-03-12 PROCEDURE — 82962 GLUCOSE BLOOD TEST: CPT | Mod: 91

## 2024-03-12 PROCEDURE — 93005 ELECTROCARDIOGRAM TRACING: CPT | Performed by: NURSE PRACTITIONER

## 2024-03-12 PROCEDURE — 770022 HCHG ROOM/CARE - ICU (200)

## 2024-03-12 PROCEDURE — A9270 NON-COVERED ITEM OR SERVICE: HCPCS | Mod: JZ

## 2024-03-12 RX ORDER — DEXTROSE MONOHYDRATE 25 G/50ML
25 INJECTION, SOLUTION INTRAVENOUS
Status: DISCONTINUED | OUTPATIENT
Start: 2024-03-12 | End: 2024-03-16 | Stop reason: HOSPADM

## 2024-03-12 RX ORDER — POTASSIUM CHLORIDE 7.45 MG/ML
10 INJECTION INTRAVENOUS ONCE
Status: COMPLETED | OUTPATIENT
Start: 2024-03-12 | End: 2024-03-12

## 2024-03-12 RX ORDER — FUROSEMIDE 10 MG/ML
20 INJECTION INTRAMUSCULAR; INTRAVENOUS
Status: DISCONTINUED | OUTPATIENT
Start: 2024-03-12 | End: 2024-03-16

## 2024-03-12 RX ORDER — POTASSIUM CHLORIDE 20 MEQ/1
10 TABLET, EXTENDED RELEASE ORAL 2 TIMES DAILY
Status: DISCONTINUED | OUTPATIENT
Start: 2024-03-12 | End: 2024-03-16

## 2024-03-12 RX ORDER — POTASSIUM CHLORIDE 7.45 MG/ML
10 INJECTION INTRAVENOUS ONCE
Qty: 100 ML | Refills: 0 | Status: COMPLETED | OUTPATIENT
Start: 2024-03-12 | End: 2024-03-12

## 2024-03-12 RX ADMIN — OXYCODONE 5 MG: 5 TABLET ORAL at 23:50

## 2024-03-12 RX ADMIN — FUROSEMIDE 20 MG: 10 INJECTION INTRAMUSCULAR; INTRAVENOUS at 13:13

## 2024-03-12 RX ADMIN — Medication 1 APPLICATOR: at 09:17

## 2024-03-12 RX ADMIN — ATORVASTATIN CALCIUM 40 MG: 40 TABLET, FILM COATED ORAL at 20:07

## 2024-03-12 RX ADMIN — CLOPIDOGREL BISULFATE 75 MG: 75 TABLET ORAL at 05:57

## 2024-03-12 RX ADMIN — METOPROLOL TARTRATE 12.5 MG: 25 TABLET, FILM COATED ORAL at 05:56

## 2024-03-12 RX ADMIN — ACETAMINOPHEN 1000 MG: 500 TABLET, FILM COATED ORAL at 05:56

## 2024-03-12 RX ADMIN — OXYCODONE 5 MG: 5 TABLET ORAL at 05:56

## 2024-03-12 RX ADMIN — DIPHENHYDRAMINE HYDROCHLORIDE 25 MG: 25 TABLET ORAL at 00:12

## 2024-03-12 RX ADMIN — ACETAMINOPHEN 1000 MG: 500 TABLET, FILM COATED ORAL at 00:12

## 2024-03-12 RX ADMIN — ACETAMINOPHEN 1000 MG: 500 TABLET, FILM COATED ORAL at 13:12

## 2024-03-12 RX ADMIN — DOCUSATE SODIUM 100 MG: 100 CAPSULE, LIQUID FILLED ORAL at 17:30

## 2024-03-12 RX ADMIN — FUROSEMIDE 20 MG: 10 INJECTION INTRAMUSCULAR; INTRAVENOUS at 17:31

## 2024-03-12 RX ADMIN — ENOXAPARIN SODIUM 40 MG: 100 INJECTION SUBCUTANEOUS at 17:30

## 2024-03-12 RX ADMIN — OMEPRAZOLE 20 MG: 20 CAPSULE, DELAYED RELEASE ORAL at 05:56

## 2024-03-12 RX ADMIN — ACETAMINOPHEN 1000 MG: 500 TABLET, FILM COATED ORAL at 23:50

## 2024-03-12 RX ADMIN — ACETAMINOPHEN 1000 MG: 500 TABLET, FILM COATED ORAL at 17:30

## 2024-03-12 RX ADMIN — METOPROLOL TARTRATE 12.5 MG: 25 TABLET, FILM COATED ORAL at 17:30

## 2024-03-12 RX ADMIN — Medication 1 APPLICATOR: at 20:07

## 2024-03-12 RX ADMIN — POTASSIUM CHLORIDE 10 MEQ: 7.46 INJECTION, SOLUTION INTRAVENOUS at 05:25

## 2024-03-12 RX ADMIN — DOCUSATE SODIUM 100 MG: 100 CAPSULE, LIQUID FILLED ORAL at 05:57

## 2024-03-12 RX ADMIN — POTASSIUM CHLORIDE 10 MEQ: 7.46 INJECTION, SOLUTION INTRAVENOUS at 01:43

## 2024-03-12 RX ADMIN — ASPIRIN 81 MG: 81 TABLET, COATED ORAL at 05:56

## 2024-03-12 RX ADMIN — OXYCODONE 5 MG: 5 TABLET ORAL at 17:30

## 2024-03-12 RX ADMIN — DOCUSATE SODIUM 50 MG AND SENNOSIDES 8.6 MG 1 TABLET: 8.6; 5 TABLET, FILM COATED ORAL at 20:06

## 2024-03-12 RX ADMIN — MAGNESIUM SULFATE IN DEXTROSE 1 G: 10 INJECTION, SOLUTION INTRAVENOUS at 06:33

## 2024-03-12 RX ADMIN — POTASSIUM CHLORIDE 10 MEQ: 1500 TABLET, EXTENDED RELEASE ORAL at 13:12

## 2024-03-12 RX ADMIN — OXYCODONE 5 MG: 5 TABLET ORAL at 00:12

## 2024-03-12 RX ADMIN — POTASSIUM CHLORIDE 10 MEQ: 1500 TABLET, EXTENDED RELEASE ORAL at 18:26

## 2024-03-12 RX ADMIN — LEVOTHYROXINE SODIUM 125 MCG: 0.12 TABLET ORAL at 05:57

## 2024-03-12 RX ADMIN — OXYCODONE 5 MG: 5 TABLET ORAL at 10:35

## 2024-03-12 ASSESSMENT — ENCOUNTER SYMPTOMS
COUGH: 0
FEVER: 0
SPUTUM PRODUCTION: 0
NAUSEA: 1
SORE THROAT: 0
VOMITING: 0
EYES NEGATIVE: 1
BLOOD IN STOOL: 0
FOCAL WEAKNESS: 0
HEMOPTYSIS: 0
SHORTNESS OF BREATH: 0
BACK PAIN: 1
ABDOMINAL PAIN: 0
BRUISES/BLEEDS EASILY: 0
CHILLS: 0
ORTHOPNEA: 0
PALPITATIONS: 0
NERVOUS/ANXIOUS: 0
HEADACHES: 0

## 2024-03-12 ASSESSMENT — PAIN DESCRIPTION - PAIN TYPE
TYPE: SURGICAL PAIN

## 2024-03-12 ASSESSMENT — PAIN SCALES - GENERAL: PAIN_LEVEL: 5

## 2024-03-12 ASSESSMENT — FIBROSIS 4 INDEX: FIB4 SCORE: 2.22

## 2024-03-12 NOTE — ANESTHESIA POSTPROCEDURE EVALUATION
Patient: Julian Christine    Procedure Summary       Date: 03/11/24 Room / Location: [facility] OR 02 / SURGERY [facility]    Anesthesia Start: 0720 Anesthesia Stop: 1230    Procedures:       CABG X4, WITH ENDOSCOPIC VEIN PROCUREMENT (Chest)      ECHOCARDIOGRAM, TRANSESOPHAGEAL, INTRAOPERATIVE (Esophagus) Diagnosis: (Cornary Artery Disease)    Surgeons: Salas Chapman D.O. Responsible Provider: Irving Mendosa M.D.    Anesthesia Type: general ASA Status: 4            Final Anesthesia Type: general  Last vitals  BP   Blood Pressure: 112/63, NIBP: 130/74 ([place]), Arterial BP: 103/52    Temp   36.9 °C (98.4 °F)    Pulse   69   Resp   (!) 22    SpO2   94 %      Anesthesia Post Evaluation    Patient location during evaluation: ICU  Patient participation: complete - patient participated  Level of consciousness: awake  Pain score: 5    Airway patency: patent  Anesthetic complications: no  Cardiovascular status: hemodynamically stable  Respiratory status: acceptable  Hydration status: balanced  Comments:  Extubated 1741 to 3L NC    PONV: none          There were no known notable events for this encounter.     Nurse Pain Score: 5 (NPRS)

## 2024-03-12 NOTE — PROGRESS NOTES
Cardiovascular Surgery Progress Note    Name: Julian Christine  MRN: [id number]  : 1960  Admit Date: 3/7/2024 10:02 AM  1 Day Post-Op     Procedure:  Procedure(s) and Anesthesia Type:     * CABG X4, WITH ENDOSCOPIC VEIN PROCUREMENT - General     * ECHOCARDIOGRAM, TRANSESOPHAGEAL, INTRAOPERATIVE - General    Vitals:  Vitals:    24 0930 24 0945 24 1000 24 1015   BP:   100/59    Pulse: 66 67 70 70   Resp: 20 (!) 22 (!) 28 (!) 32   Temp:   37 °C (98.6 °F)    TempSrc:       SpO2: 94% 94% 94% 94%   Weight:       Height:          Temp (24hrs), Av.9 °C (98.5 °F), Min:36.4 °C (97.5 °F), Max:37.2 °C (99 °F)      Respiratory:  Vent Mode: Spont, PEEP/CPAP: 8, PIP: 18, MAP: 12 Respiration: (!) 32, Pulse Oximetry: 94 %       Fluids:    Intake/Output Summary (Last 24 hours) at 3/12/2024 1048  Last data filed at 3/12/2024 0800  Gross per 24 hour   Intake 3702.95 ml   Output 5540 ml   Net -1837.05 ml     Admit weight: Weight: 77.1 kg (170 lb)  Current weight: Weight: 77.8 kg (171 lb 8.3 oz) (24 0615)    Labs:  Recent Labs     03/10/24  0828 24  0100 24  1227 24  0007   WBC 10.8 10.9*  --  14.0*   RBC 4.27* 3.98*  --  3.51*   HEMOGLOBIN 14.8 13.7* 13.6* 12.2*   HEMATOCRIT 42.6 40.0* 38.0* 36.0*   MCV 99.8* 100.5*  --  102.6*   MCH 34.7* 34.4*  --  34.8*   MCHC 34.7 34.3  --  33.9   RDW 49.3 49.2  --  51.8*   PLATELETCT 247 228 126* 153*   MPV 11.0 11.3  --  11.8     Recent Labs     03/10/24  0828 24  0100 24  1227 24  1730 24  0007 24  0410   SODIUM 136 135  --   --  140  --    POTASSIUM 4.0 3.7   < > 4.0 4.2 4.2   CHLORIDE 103 103  --   --  107  --    CO2 21 21  --   --  18*  --    GLUCOSE 119* 172*  --   --  154*  --    BUN 11 13  --   --  10  --    CREATININE 0.77 0.82  --   --  0.80  --    CALCIUM 9.0 8.7  --   --  7.7*  --     < > = values in this interval not displayed.     Recent Labs     03/10/24  0828 03/11/24  1227   APTT 83.1* 30.5   INR  1.04 1.28*       HgbA1c:  6.1     Diabetic Educator Consult:  no    Medications:  Scheduled Medications   Medication Dose Frequency    insulin regular  0-14 Units Once    insulin lispro  0-14 Units TID AC    Nozin nasal  swab  1 Applicator BID    magnesium sulfate  1 g DAILY    metoprolol tartrate  12.5 mg BID    Followed by    [START ON 3/13/2024] metoprolol tartrate  25 mg BID    aspirin  81 mg DAILY    clopidogrel  75 mg DAILY    atorvastatin  40 mg QHS    Pharmacy Consult Request  1 Each PHARMACY TO DOSE    acetaminophen  1,000 mg Q6HRS    docusate sodium  100 mg BID    senna-docusate  1 Tablet Nightly    omeprazole  20 mg DAILY    enoxaparin (LOVENOX) injection  40 mg DAILY AT 1800    levothyroxine  125 mcg QAM        Exam:   Physical Exam  Vitals and nursing note reviewed.   Constitutional:       General: He is not in acute distress.  HENT:      Head: Normocephalic.      Right Ear: External ear normal.      Left Ear: External ear normal.      Nose: Nose normal.      Mouth/Throat:      Mouth: Mucous membranes are moist.   Eyes:      Pupils: Pupils are equal, round, and reactive to light.   Cardiovascular:      Rate and Rhythm: Normal rate.      Pulses: Normal pulses.   Pulmonary:      Effort: Pulmonary effort is normal.   Abdominal:      General: Abdomen is flat. There is no distension.      Palpations: Abdomen is soft.   Musculoskeletal:         General: Normal range of motion.      Cervical back: Normal range of motion.      Right lower leg: Edema present.      Left lower leg: Edema present.   Skin:     General: Skin is warm and dry.      Comments: Sternotomy: with island dressing CDI  SVG site: with ace wrap, CDI   Neurological:      Mental Status: He is alert and oriented to person, place, and time.   Psychiatric:         Mood and Affect: Mood normal.         Behavior: Behavior normal.         Judgment: Judgment normal.       Cardiac Medications:    ASA - Yes    Plavix - Yes    Post-operative Beta  Blockers - Yes    Ace/ARB- No; contraindicated because of Normal EF    Statin - Yes    Aldactone- No; contraindicated because of Normal EF    SGLT2i-  No contraindicated because of No; contraindicated because of Normal EF    Ejection Fraction:  70%    Telemetry:   3/12: SR    Assessment/Plan:  POD 1  HDS, extubated on 2L NC, off gtts, neuro intact, wounds CDI, abdomen soft non tender, flatus +, BM -, fluid balance negative, wt up,  H/h:12/36, Cr:0.80.  Plan: remove mediastinal chest tubes, keep pacing wires. Remove rice once ambulatory, begin gentle diuresis. Encourage IS/ambulate.      Disposition:  TBD

## 2024-03-12 NOTE — PROGRESS NOTES
Patient meeting all extubation parameters, follows commands, ASHRAF, NIF -36, Vt 2000. CO2 25, pH 7.514, O2 97%.      Patient extubated at 1741 by RT, placed on 3L NC oxygen saturation 96%. No stridor.

## 2024-03-12 NOTE — CARE PLAN
The patient is Watcher - Medium risk of patient condition declining or worsening    Shift Goals  Clinical Goals: Wean vasoactive medications, hemodynamic stability  Patient Goals: Rest  Family Goals: Get well and go home    Progress made toward(s) clinical / shift goals:    Problem: Pain - Standard  Goal: Alleviation of pain or a reduction in pain to the patient’s comfort goal  Outcome: Progressing  Note: Pain being managed with scheduled Tylenol and PRN oxycodone     Problem: Day of surgery post CABG/Heart valve replacement  Goal: Stabilization in immediate post op period  Outcome: Progressing  Intervention: VS q 15 min x 4 hours, then q 1 hour. Include temperature immediately upon arrival. Check CO/CI q 2-4 hours and PRN  Note: Vital signs recorded and monitored per protocol.  Intervention: If radial artery used, elevate arm, no BP checks or needle sticks from affected arm, monitor ulnar pulse and capillary refill  Note: N/A  Intervention: First post op hour labs and EKG per order  Note: Completed by day RN on 3/11  Intervention: Serum K q 6 hours x 24 hours.  ABG and CBC prn.  Note: Serum potassium drawn q 6 hr and being replaced appropriately  Intervention: Initiate post cardiac insulin infusion protocol orders for FSBS greater than 140 and check frequency per protocol  Note: Pt on insulin infusion during surgery  Intervention: FSBS frequency as per Cardiac Surgery Insulin Drip Protocol  Note: Insulin infusion off. FSBS q 2 hr, FSBS remains within parameters  Intervention: For patients on Beta Blockers: verify dose given prior to surgery or within 6 hours after arrival to the unit  Note: Beta blocker given in AM of 3/11  Intervention: Chest tube to 20 cm suction, record CT drainage with VS, and check for air leak  Note: Chest tubes to suction. Air leak present.  Intervention: For CT drainage >300 mL in first hour post op and/or 150 mL in subsequent hours: Stat platelets, PT, INR, TEG, iSTAT, and H&H per  order  Note: Chest tube drainage remains within parameters  Intervention: Titrate and wean off vasoactive drips per patient's condition and per MD order while maintaining SBP  mmHg per MD order  Note: Norepinephrine off. SBPs remain within parameters.  Intervention: VAP protocol in place  Note: VAP protocol in place  Intervention: Wean from Vent per protocol (see protocol), extubation goal within 6 hours post op  Note: Extubated at 1741  Intervention: IS q 1 hour while awake post extubation  Note: IS utilized 1 hour post extubation, best pull 1000  Intervention: Bedrest until extubated and groin lines out  Note: N/A  Intervention: Dangle within 4 hours post extubation  Note: Pt to EOB at 1900  Intervention: Up in chair 4 hours, day of extubation  Note: To be completed in AM of 3/12  Intervention: Maintain all original surgical dressings per provider orders and specifications  Note: All dressings remain clean, dry, and intact  Intervention: Clear liquids post extubation, order carbohydrate free (post cardiac surgery) diet, advance as tolerated  Note: Pt tolerating clears, will advance in AM  Intervention: Discontinue [place] pedrito and arterial line 12-18 hours post op if hemodynamically stable and off vasoactive drips  Note: N/A  Intervention: A-Fib and DVT prophylaxis per MD order or contraindications documented (refer to DVT/VTE problem on Care Plan)  Note: N/A  Intervention: Amiodarone protocol per MD order  Note: N/A       Patient is not progressing towards the following goals:

## 2024-03-12 NOTE — PROGRESS NOTES
Monitor summary:    Sinus rhythm with rates from the 60s to 80s  Occasional PVCs  .14/.08/.46    Pacer wires in place, no pacing requirements. Back up settings: VVI 50/5/2

## 2024-03-12 NOTE — PROGRESS NOTES
Critical Care Progress Note    Date of admission  3/7/2024    Chief Complaint  63 y.o. male w/PMHx HTN, DLD, DM who presented 3/7/2024 with chest pain which was relieved with aspirin heparin.  Troponin was elevated and angiogram revealed multivessel disease.  Patient seen by [facility] and is taken electively to the operating room for three-vessel CABG which was performed today.  No complications intraoperatively.  V wires functional but did not require pacing.  Mild hypotension on low-dose norepinephrine infusion.  Blood sugar a bit labile currently on 1 unit of insulin.  Sedated with dexmedetomidine at 0.5 but still somnolent and not moving because he was not reversed.  Initial blood gas post op 7.42/37/247.  CXR not released yet but anesthesia did see the film and felt tubes and lines were in good position without significant edema, atelectasis or pneumothorax.  Case reviewed at length at the bedside with cardiac surgery and cardiac anesthesia.   ([facility] HPI 3/11)    Hospital Course  No notes on file    Interval Problem Update  Reviewed last 24 hour events:    A&O x4  Pain controlled with oxycodone  Some nausea but no vomiting  No shortness of breath  SR 70s  SBP 90-110s  Weaned off norepinephrine now on low-dose clevidipine  UO adequate  Work of breathing well, extubated yesterday afternoon  I-S 1000 cc  CXR: ATX/effusion left base, right subclavian  Tmax 99.0, WBC 14.0  Hemoglobin 12.2,   Bicarb 18, AG 15, normal renal function, K4.2  Glucose: 179, 159, 152, 160, 162, 142  Insulin infusion discontinued, SSI/hypoglycemia protocols  Omeprazole  Metoprolol  Levothyroxine  DAPT, atorvastatin, enoxaparin      Review of Systems  Review of Systems   Constitutional:  Negative for chills, fever and malaise/fatigue.   HENT:  Negative for congestion and sore throat.    Eyes: Negative.    Respiratory:  Negative for cough, hemoptysis, sputum production and shortness of breath.    Cardiovascular:  Positive for chest pain  (Incisional) and leg swelling (Minimal left lower extremity associated venous harvest). Negative for palpitations and orthopnea.   Gastrointestinal:  Positive for nausea. Negative for abdominal pain, blood in stool and vomiting.   Genitourinary:  Negative for dysuria and hematuria.   Musculoskeletal:  Positive for back pain (Mid thoracic, mild).   Neurological:  Negative for focal weakness and headaches.   Endo/Heme/Allergies:  Does not bruise/bleed easily.   Psychiatric/Behavioral:  The patient is not nervous/anxious.         Vital Signs for last 24 hours   Temp:  [36.8 °C (98.2 °F)-37.2 °C (99 °F)] 37 °C (98.6 °F)  Pulse:  [62-91] 70  Resp:  [10-32] 32  BP: ()/(50-77) 100/59  SpO2:  [92 %-100 %] 94 %    Hemodynamic parameters for last 24 hours  CVP:  [2 MM HG-34 MM HG] 2 MM HG    Respiratory Information for the last 24 hours  Vent Mode: Spont  PEEP/CPAP: 8  P Support: 5    Physical Exam   Physical Exam  Vitals reviewed.   Constitutional:       Appearance: He is not ill-appearing.   HENT:      Head: Normocephalic and atraumatic.      Mouth/Throat:      Mouth: Mucous membranes are moist.   Eyes:      General: No scleral icterus.     Extraocular Movements: Extraocular movements intact.      Pupils: Pupils are equal, round, and reactive to light.   Cardiovascular:      Rate and Rhythm: Normal rate and regular rhythm.      Pulses: Normal pulses.      Heart sounds: No murmur heard.     No gallop.   Pulmonary:      Effort: No respiratory distress.      Breath sounds: No wheezing, rhonchi or rales.   Abdominal:      General: Bowel sounds are normal.      Palpations: Abdomen is soft.      Tenderness: There is no abdominal tenderness. There is no right CVA tenderness, left CVA tenderness or guarding.   Musculoskeletal:      Cervical back: Neck supple.      Right lower leg: No edema.   Lymphadenopathy:      Cervical: No cervical adenopathy.   Skin:     General: Skin is warm and dry.      Capillary Refill: Capillary  refill takes less than 2 seconds.   Neurological:      General: No focal deficit present.      Mental Status: He is alert and oriented to person, place, and time. Mental status is at baseline.   Psychiatric:         Mood and Affect: Mood normal.         Behavior: Behavior normal.         Thought Content: Thought content normal.         Medications  Current Facility-Administered Medications   Medication Dose Route Frequency Provider Last Rate Last Admin    furosemide (Lasix) injection 20 mg  20 mg Intravenous BID DIURETIC Chip Johnson P.A.-C.   20 mg at 03/12/24 1313    potassium chloride SA (Kdur) tablet 10 mEq  10 mEq Oral BID Chip Johnson P.A.-C.   10 mEq at 03/12/24 1312    insulin regular (HumuLIN R,NovoLIN R) injection  2-9 Units Subcutaneous 4X/DAY ACHS Salas Chapman, D.O.        And    dextrose 50% (D50W) injection 25 g  25 g Intravenous Q15 MIN PRN Salas Chapman, D.O.        insulin lispro (HumaLOG,AdmeLOG) injection  0-14 Units Subcutaneous TID AC Salas Chapman, D.O.        norepinephrine (Levophed) 8 mg in 250 mL NS infusion (premix)  0-1 mcg/kg/min (Ideal) Intravenous Continuous Salas Sethit, D.O.   Stopped at 03/12/24 0018    EPINEPHrine (Adrenalin) infusion 4 mg/250 mL (premix)  0-0.5 mcg/kg/min (Ideal) Intravenous Continuous Salas Sethit, D.O.   Dose not Required at 03/11/24 1245    Respiratory Therapy Consult   Nebulization Continuous RT Monty Gallego, A.P.R.N.        NS infusion   Intravenous Continuous Monty Gallego, A.P.R.N.   Stopped at 03/11/24 2041    NS infusion   Intravenous Continuous Monty Gallego, A.P.R.N. 30 mL/hr at 03/11/24 1249 Rate Change at 03/11/24 1249    electrolyte-A (Plasmalyte-A) infusion   Intravenous PRN Monty Gallego, A.P.R.N. 999 mL/hr at 03/11/24 1558 New Bag at 03/11/24 1558    Nozin nasal  swab  1 Applicator Each Nostril BID Monty Gallego, A.P.R.N.   1 Applicator at 03/12/24 0917    magnesium sulfate in D5W IVPB premix 1 g   1 g Intravenous DAILY Monty Gallego, A.P.R.N.   Stopped at 03/12/24 0733    metoprolol tartrate (Lopressor) tablet 12.5 mg  12.5 mg Oral BID Monty Gallego, A.P.R.N.   12.5 mg at 03/12/24 0556    Followed by    [START ON 3/13/2024] metoprolol tartrate (Lopressor) tablet 25 mg  25 mg Oral BID Monty Gallego, A.P.R.N.        aspirin EC tablet 81 mg  81 mg Oral DAILY Monty Gallego, A.P.R.N.   81 mg at 03/12/24 0556    clopidogrel (Plavix) tablet 75 mg  75 mg Oral DAILY Monty Gallego, A.P.R.N.   75 mg at 03/12/24 0557    atorvastatin (Lipitor) tablet 40 mg  40 mg Oral QHS Monty Gallego, A.P.R.N.   40 mg at 03/11/24 2035    clevidipine (Cleviprex) IV emulsion  0-21 mg/hr Intravenous Continuous Monty Gallego, A.P.R.N. 2 mL/hr at 03/12/24 0500 1 mg/hr at 03/12/24 0500    nitroglycerin 50 mg in D5W 250 ml infusion  0-100 mcg/min Intravenous Continuous Monty Gallego, A.P.R.N.   Dose not Required at 03/11/24 1245    Pharmacy Consult Request ...Pain Management Review 1 Each  1 Each Other PHARMACY TO DOSE Monty Gallego, A.P.R.N.        acetaminophen (Tylenol) tablet 1,000 mg  1,000 mg Oral Q6HRS Monty Gallego, A.P.R.N.   1,000 mg at 03/12/24 1312    Followed by    [START ON 3/21/2024] acetaminophen (Tylenol) tablet 1,000 mg  1,000 mg Oral Q6HRS PRN Monty Gallego, A.P.R.N.        oxyCODONE immediate-release (Roxicodone) tablet 5 mg  5 mg Oral Q3HRS PRN Monty Gallego, A.P.R.N.   5 mg at 03/12/24 1035    Or    oxyCODONE immediate release (Roxicodone) tablet 10 mg  10 mg Oral Q3HRS PRN Monty Gallego, A.P.R.N.        Or    fentaNYL (Sublimaze) injection 50 mcg  50 mcg Intravenous Q3HRS PRN Monty Gallego, A.P.R.N.        traMADol (Ultram) 50 MG tablet 50 mg  50 mg Oral Q4HRS PRN Monty Gallego, A.P.R.N.        midazolam (Versed) injection 2 mg  2 mg Intravenous Q HOUR PRN Monty Gallego, A.P.R.N.        dexmedetomidine (PRECEDEX) 400 mcg/100mL NS premix infusion  0-1.5 mcg/kg/hr (Ideal) Intravenous Continuous Monty Gallego, A.P.R.N.    Stopped at 03/11/24 1507    sodium bicarbonate 8.4 % injection 50 mEq  50 mEq Intravenous Q HOUR PRN Monty Gallego, A.P.R.N.        morphine 4 MG/ML injection 4 mg  4 mg Intravenous Q HOUR PRN Monty Gallego, A.P.R.N.        ondansetron (Zofran) syringe/vial injection 8 mg  8 mg Intravenous Q6HRS PRN Monty Gallego, A.P.R.N.        Or    prochlorperazine (Compazine) injection 10 mg  10 mg Intravenous Q6HRS PRN Monty Gallego, A.P.R.N.        acetaminophen (Tylenol) tablet 650 mg  650 mg Oral Q4HRS PRN Monty Gallego, A.P.R.N.        Or    acetaminophen (Tylenol) suppository 650 mg  650 mg Rectal Q4HRS PRN Monty Gallego, A.P.R.N.        docusate sodium (Colace) capsule 100 mg  100 mg Oral BID Monty Gallego, A.P.R.N.   100 mg at 03/12/24 0557    senna-docusate (Pericolace Or Senokot S) 8.6-50 MG per tablet 1 Tablet  1 Tablet Oral Nightly Monty Gallego, A.P.R.N.   1 Tablet at 03/11/24 2035    senna-docusate (Pericolace Or Senokot S) 8.6-50 MG per tablet 1 Tablet  1 Tablet Oral Q24HRS PRN Monty Gallego, A.P.R.N.        polyethylene glycol/lytes (Miralax) Packet 1 Packet  1 Packet Oral BID PRN Monty Gallego, A.P.R.N.        magnesium hydroxide (Milk Of Magnesia) suspension 30 mL  30 mL Oral QDAY PRN Monty Gallego, A.P.R.N.        bisacodyl (Dulcolax) suppository 10 mg  10 mg Rectal Q24HRS PRN Monty Gallego, A.P.R.N.        sodium phosphate (Fleet) enema 133 mL  1 Each Rectal Once PRN Monty Gallego, A.P.R.N.        omeprazole (PriLOSEC) capsule 20 mg  20 mg Oral DAILY Monty Gallego, A.P.R.N.   20 mg at 03/12/24 0556    mag hydrox-al hydrox-simeth (Maalox Plus Es Or Mylanta Ds) suspension 30 mL  30 mL Oral Q4HRS PRN Monty Gallego, A.P.R.N.        diphenhydrAMINE (Benadryl) tablet/capsule 25 mg  25 mg Oral HS PRN - MR X 1 Monty Gallego A.P.R.N.   25 mg at 03/12/24 0012    enoxaparin (Lovenox) inj 40 mg  40 mg Subcutaneous DAILY AT 1800 Monty Gallego, A.P.R.N.        levothyroxine (Synthroid) tablet 125 mcg  125 mcg Oral QAM  Monty Gallego A.P.R.NPat   125 mcg at 03/12/24 0557       Fluids    Intake/Output Summary (Last 24 hours) at 3/12/2024 1440  Last data filed at 3/12/2024 0800  Gross per 24 hour   Intake 2370.78 ml   Output 1055 ml   Net 1315.78 ml       Laboratory  Recent Labs     03/11/24  1345 03/11/24  1539 03/11/24  1737   ISTATAPH 7.519* 7.469 7.514*   ISTATAPCO2 25.8* 29.2 25.0*   ISTATAPO2 94* 135* 83   ISTATATCO2 22 22 21   [id number] 98 99 97   ISTATARTHCO3 21.0 21.2 20.1   ISTATARTBE 0 -1 -1   ISTATTEMP 36.6 C 37.0 C 37.2 C   ISTATFIO2 70 50 30   ISTATSPEC Arterial Arterial Arterial   ISTATAPHTC 7.525* 7.469 7.511*   [id number] 92* 135* 84         Recent Labs     03/10/24  0828 03/11/24  0100 03/11/24  1227 03/11/24  1730 03/12/24  0007 03/12/24  0410   SODIUM 136 135  --   --  140  --    POTASSIUM 4.0 3.7 4.2 4.0 4.2 4.2   CHLORIDE 103 103  --   --  107  --    CO2 21 21  --   --  18*  --    BUN 11 13  --   --  10  --    CREATININE 0.77 0.82  --   --  0.80  --    MAGNESIUM  --   --  3.2*  --   --   --    CALCIUM 9.0 8.7  --   --  7.7*  --      Recent Labs     03/10/24  0828 03/11/24  0100 03/12/24  0007   ALTSGPT 29  --   --    ASTSGOT 29  --   --    ALKPHOSPHAT 68  --   --    TBILIRUBIN 0.6  --   --    GLUCOSE 119* 172* 154*     Recent Labs     03/10/24  0355 03/10/24  0828 03/11/24  0100 03/12/24  0007   WBC 11.4* 10.8 10.9* 14.0*   NEUTSPOLYS 43.30* 39.60* 40.80*  --    LYMPHOCYTES 36.30 41.50* 42.60*  --    MONOCYTES 8.00 12.30 10.80  --    EOSINOPHILS 8.00* 5.10 4.20  --    BASOPHILS 3.50* 1.20 1.10  --    ASTSGOT  --  29  --   --    ALTSGPT  --  29  --   --    ALKPHOSPHAT  --  68  --   --    TBILIRUBIN  --  0.6  --   --      Recent Labs     03/10/24  0828 03/11/24  0100 03/11/24  1227 03/12/24  0007   RBC 4.27* 3.98*  --  3.51*   HEMOGLOBIN 14.8 13.7* 13.6* 12.2*   HEMATOCRIT 42.6 40.0* 38.0* 36.0*   PLATELETCT 247 228 126* 153*   PROTHROMBTM 13.7  --  16.2*  --    APTT 83.1*  --  30.5  --    INR 1.04  --  1.28*   --        Imaging  X-Ray:  I have personally reviewed the images and compared with prior images.  EKG:  I have personally reviewed the images and compared with prior images.  Echo:   Reviewed    Assessment/Plan  * Encounter for weaning from ventilator ([facility])  Assessment & Plan  Intubated electively with grade 1 airway 3/11  Extubated 3/11  Mild hypoxemia on low-flow nasal cannula O2  RT/O2 protocols  Mobilize  Incentive spirometry  Forced diuresis as clinically prudent    S/P CABG x 3  Assessment & Plan  Elective three-vessel CABG 3/11 by Dr. Chapman  Actively titrating norepinephrine for hypotension  Transition to clevidipine as needed for hypertension  Goal BP   Monitoring CVP  V Wires present and tested, has not required pacing  Mediastinal chest tubes with no air leak per [facility]  Anticoagulation/antiplatelets as per [facility] postop  Dexmedetomidine infusion for sedation weaned off  Forced diuresis as clinically appropriate-IV furosemide  GDMT    ACS (acute coronary syndrome) ([facility])- (present on admission)  Assessment & Plan  Admitted with NSTEMI/ACS  Three-vessel disease by angiogram  CABG three-vessel 3/11  GDMT    Type 2 diabetes mellitus, without long-term current use of insulin ([facility])- (present on admission)  Assessment & Plan  Continuous insulin infusion protocol x 24 hours post CABG  Transition to SSI/glargine as clinically appropriate  Post levels 138-179  Continue to hold metformin    Primary hypertension- (present on admission)  Assessment & Plan  Hold home regimen for now  Metoprolol initiated  Titrating clevidipine as needed  Consider adding ACE inhibitor    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  Reviewed prior to discharge from hospital    Hypothyroid- (present on admission)  Assessment & Plan  Resume home levothyroxine, clinically appears euthyroid  Recent TSH borderline depressed at 0.29, follow-up as an outpatient   If develops A-fib RVR  will check free T3 and T4 levels and repeat TSH    Hyperlipidemia- (present on admission)  Assessment & Plan  Resume atorvastatin today    History of Hodgkin's lymphoma- (present on admission)  Assessment & Plan  History of         VTE:  Lovenox  Ulcer: PPI  Lines: Central Line  Ongoing indication addressed, Arterial Line  Ongoing indication addressed, and Baca Catheter  Ongoing indication addressed    I have performed a physical exam and reviewed and updated ROS and Plan today (3/12/2024). In review of yesterday's note (3/11/2024), there are no changes except as documented above.     Discussed patient condition and risk of morbidity and/or mortality with Family, RN, RT, Pharmacy, Charge nurse / hot rounds, Patient, and [facility]

## 2024-03-12 NOTE — CARE PLAN
Problem: Hyperinflation  Goal: Prevent or improve atelectasis  Description: Target End Date:  3 to 4 days    1. Instruct incentive spirometry usage  2.  Perform hyperinflation therapy as indicated  Flowsheets (Taken 3/11/2024 1741 by Marcos Mon, MEHUL)  Hyperinflation Protocol Goals/Outcome: Increase and/or stable inspiratory capacity for 24 hours  Hyperinflation Protocol Indications: Abdominal/Thoracic Surgeries (Open Heart)  Note:     Respiratory Update    Treatment modality: PEP  Frequency:QID    Pt tolerating current treatments well with no adverse reactions.

## 2024-03-12 NOTE — THERAPY
Physical Therapy Contact Note    Patient Name: Julian Christine  Age:  63 y.o., Sex:  male  Medical Record #: [id number]  Today's Date: 3/12/2024    Discussed with RN: pt is met up in chair, plan is to go for first walk this pm with nsg. Pt is seen today to initiate education, packet is introduced to pt and wife and reviewed briefly. Will complete full eval and education on 3/13.        03/12/24 0954   Charge Group   PT Self Care / Home Evaluation (Units) 2   Total Time Spent   PT Total Time Yes   PT Self Care/Home Evaluation Time Spent (Mins) 23   PT Total Time Spent (Calculated) 23   Precautions   Precautions Sternal Precautions (See Comments);Cardiac Precautions (See Comments)   Pain 0 - 10 Group   Therapist Pain Assessment 0;Prior to Activity;Nurse Notified   Prior Living Situation   Prior Services None   Housing / Facility 1 Story House   Steps Into Home 2   Steps In Home 0   Rail   (furniture on R side that he can use like a rail)   Equipment Owned None   Lives with - Patient's Self Care Capacity Spouse  (Ruby, home and can help as needed.)   Prior Level of Functional Mobility   Bed Mobility Independent   Transfer Status Independent   Ambulation Independent   Ambulation Distance community   Assistive Devices Used None   Stairs Independent   Cognition    Level of Consciousness Alert   Comments able to follow education, wife present, attentive to education.   Education Group   Education Provided Cardiac Precautions;Sternal Precautions   Cardiac Precautions Patient Response Patient;Acceptance;Explanation;Handout;Verbal Demonstration;Family   Sternal Precautions Patient Response Patient;Acceptance;Explanation;Handout;Family;Verbal Demonstration   Interdisciplinary Plan of Care Collaboration   IDT Collaboration with  Nursing   Session Information   Date / Session Number  3/12-Ed done,  (first walk this pm with nsg, PT to complete eval on 3/13)

## 2024-03-12 NOTE — PROGRESS NOTES
4 Eyes Skin Assessment Completed by SANTOSH Alcantara and SANTOSH Covarrubias.    Head WDL  Ears WDL  Nose WDL  Mouth WDL  Neck WDL  Breast/Chest Incision - midline incision  Shoulder Blades WDL  Spine WDL  (R) Arm/Elbow/Hand WDL  (L) Arm/Elbow/Hand WDL  Abdomen Incision - mediastinal chest tube sites  Groin WDL  Scrotum/Coccyx/Buttocks WDL  (R) Leg Incision - EVH site  (L) Leg Incision - EVH site  (R) Heel/Foot/Toe WDL  (L) Heel/Foot/Toe WDL          Devices In Places Tele Box, Blood Pressure Cuff, Pulse Ox, Arterial Line, SCD's, Pacer, and Nasal Cannula      Interventions In Place Gray Ear Foams, NC W/Ear Foams, Pillows, and Heels Loaded W/Pillows    Possible Skin Injury No    Pictures Uploaded Into Epic N/A  Wound Consult Placed N/A  RN Wound Prevention Protocol Ordered Yes

## 2024-03-13 PROBLEM — I48.91 ATRIAL FIBRILLATION WITH RVR (HCC): Status: ACTIVE | Noted: 2024-03-13

## 2024-03-13 LAB
ANION GAP SERPL CALC-SCNC: 9 MMOL/L (ref 7–16)
BUN SERPL-MCNC: 19 MG/DL (ref 8–22)
CALCIUM SERPL-MCNC: 8.3 MG/DL (ref 8.5–10.5)
CHLORIDE SERPL-SCNC: 102 MMOL/L (ref 96–112)
CO2 SERPL-SCNC: 23 MMOL/L (ref 20–33)
CREAT SERPL-MCNC: 0.91 MG/DL (ref 0.5–1.4)
EKG IMPRESSION: NORMAL
ERYTHROCYTE [DISTWIDTH] IN BLOOD BY AUTOMATED COUNT: 52.5 FL (ref 35.9–50)
GFR SERPLBLD CREATININE-BSD FMLA CKD-EPI: 94 ML/MIN/1.73 M 2
GLUCOSE BLD STRIP.AUTO-MCNC: 101 MG/DL (ref 65–99)
GLUCOSE BLD STRIP.AUTO-MCNC: 129 MG/DL (ref 65–99)
GLUCOSE BLD STRIP.AUTO-MCNC: 131 MG/DL (ref 65–99)
GLUCOSE BLD STRIP.AUTO-MCNC: 148 MG/DL (ref 65–99)
GLUCOSE SERPL-MCNC: 136 MG/DL (ref 65–99)
HCT VFR BLD AUTO: 32.5 % (ref 42–52)
HGB BLD-MCNC: 11 G/DL (ref 14–18)
MAGNESIUM SERPL-MCNC: 2.3 MG/DL (ref 1.5–2.5)
MCH RBC QN AUTO: 34.7 PG (ref 27–33)
MCHC RBC AUTO-ENTMCNC: 33.8 G/DL (ref 32.3–36.5)
MCV RBC AUTO: 102.5 FL (ref 81.4–97.8)
PLATELET # BLD AUTO: 145 K/UL (ref 164–446)
PMV BLD AUTO: 12 FL (ref 9–12.9)
POTASSIUM SERPL-SCNC: 4.4 MMOL/L (ref 3.6–5.5)
RBC # BLD AUTO: 3.17 M/UL (ref 4.7–6.1)
SODIUM SERPL-SCNC: 134 MMOL/L (ref 135–145)
T3FREE SERPL-MCNC: 1.66 PG/ML (ref 2–4.4)
T4 FREE SERPL-MCNC: 1.59 NG/DL (ref 0.93–1.7)
TSH SERPL DL<=0.005 MIU/L-ACNC: 1.3 UIU/ML (ref 0.38–5.33)
WBC # BLD AUTO: 16 K/UL (ref 4.8–10.8)

## 2024-03-13 PROCEDURE — 84481 FREE ASSAY (FT-3): CPT

## 2024-03-13 PROCEDURE — 700102 HCHG RX REV CODE 250 W/ 637 OVERRIDE(OP): Performed by: INTERNAL MEDICINE

## 2024-03-13 PROCEDURE — 700102 HCHG RX REV CODE 250 W/ 637 OVERRIDE(OP): Mod: JZ

## 2024-03-13 PROCEDURE — 84443 ASSAY THYROID STIM HORMONE: CPT

## 2024-03-13 PROCEDURE — 97163 PT EVAL HIGH COMPLEX 45 MIN: CPT

## 2024-03-13 PROCEDURE — 700102 HCHG RX REV CODE 250 W/ 637 OVERRIDE(OP): Performed by: NURSE PRACTITIONER

## 2024-03-13 PROCEDURE — 94669 MECHANICAL CHEST WALL OSCILL: CPT

## 2024-03-13 PROCEDURE — A9270 NON-COVERED ITEM OR SERVICE: HCPCS | Performed by: NURSE PRACTITIONER

## 2024-03-13 PROCEDURE — 82962 GLUCOSE BLOOD TEST: CPT | Mod: 91

## 2024-03-13 PROCEDURE — 99291 CRITICAL CARE FIRST HOUR: CPT | Performed by: INTERNAL MEDICINE

## 2024-03-13 PROCEDURE — 85027 COMPLETE CBC AUTOMATED: CPT

## 2024-03-13 PROCEDURE — 700111 HCHG RX REV CODE 636 W/ 250 OVERRIDE (IP): Mod: JZ | Performed by: NURSE PRACTITIONER

## 2024-03-13 PROCEDURE — 80048 BASIC METABOLIC PNL TOTAL CA: CPT

## 2024-03-13 PROCEDURE — 93005 ELECTROCARDIOGRAM TRACING: CPT | Performed by: THORACIC SURGERY (CARDIOTHORACIC VASCULAR SURGERY)

## 2024-03-13 PROCEDURE — 700111 HCHG RX REV CODE 636 W/ 250 OVERRIDE (IP)

## 2024-03-13 PROCEDURE — 770022 HCHG ROOM/CARE - ICU (200)

## 2024-03-13 PROCEDURE — 93010 ELECTROCARDIOGRAM REPORT: CPT | Performed by: INTERNAL MEDICINE

## 2024-03-13 PROCEDURE — P9045 ALBUMIN (HUMAN), 5%, 250 ML: HCPCS | Mod: JZ,JG | Performed by: NURSE PRACTITIONER

## 2024-03-13 PROCEDURE — A9270 NON-COVERED ITEM OR SERVICE: HCPCS | Mod: JZ

## 2024-03-13 PROCEDURE — 84439 ASSAY OF FREE THYROXINE: CPT

## 2024-03-13 PROCEDURE — 83735 ASSAY OF MAGNESIUM: CPT

## 2024-03-13 PROCEDURE — A9270 NON-COVERED ITEM OR SERVICE: HCPCS | Performed by: INTERNAL MEDICINE

## 2024-03-13 RX ORDER — CYCLOBENZAPRINE HCL 10 MG
10 TABLET ORAL 3 TIMES DAILY PRN
Status: DISCONTINUED | OUTPATIENT
Start: 2024-03-13 | End: 2024-03-16 | Stop reason: HOSPADM

## 2024-03-13 RX ORDER — MENTHOL AND METHYL SALICYLATE 7.6; 29 G/100G; G/100G
OINTMENT TOPICAL PRN
Status: DISCONTINUED | OUTPATIENT
Start: 2024-03-13 | End: 2024-03-16 | Stop reason: HOSPADM

## 2024-03-13 RX ORDER — MAGNESIUM SULFATE 1 G/100ML
1 INJECTION INTRAVENOUS ONCE
Status: COMPLETED | OUTPATIENT
Start: 2024-03-13 | End: 2024-03-13

## 2024-03-13 RX ORDER — AMIODARONE HYDROCHLORIDE 200 MG/1
400 TABLET ORAL 2 TIMES DAILY
Status: DISCONTINUED | OUTPATIENT
Start: 2024-03-13 | End: 2024-03-16 | Stop reason: HOSPADM

## 2024-03-13 RX ORDER — ALBUMIN, HUMAN INJ 5% 5 %
25 SOLUTION INTRAVENOUS ONCE
Status: COMPLETED | OUTPATIENT
Start: 2024-03-13 | End: 2024-03-13

## 2024-03-13 RX ADMIN — ASPIRIN 81 MG: 81 TABLET, COATED ORAL at 05:47

## 2024-03-13 RX ADMIN — ATORVASTATIN CALCIUM 40 MG: 40 TABLET, FILM COATED ORAL at 20:32

## 2024-03-13 RX ADMIN — METOPROLOL TARTRATE 25 MG: 25 TABLET, FILM COATED ORAL at 05:47

## 2024-03-13 RX ADMIN — AMIODARONE HYDROCHLORIDE 400 MG: 200 TABLET ORAL at 16:01

## 2024-03-13 RX ADMIN — MAGNESIUM SULFATE IN DEXTROSE 1 G: 10 INJECTION, SOLUTION INTRAVENOUS at 10:15

## 2024-03-13 RX ADMIN — CYCLOBENZAPRINE 10 MG: 10 TABLET, FILM COATED ORAL at 12:14

## 2024-03-13 RX ADMIN — FUROSEMIDE 20 MG: 10 INJECTION INTRAMUSCULAR; INTRAVENOUS at 06:27

## 2024-03-13 RX ADMIN — AMIODARONE HYDROCHLORIDE 0.5 MG/MIN: 1.8 INJECTION, SOLUTION INTRAVENOUS at 09:14

## 2024-03-13 RX ADMIN — Medication 1 APPLICATOR: at 08:38

## 2024-03-13 RX ADMIN — ALBUMIN (HUMAN) 25 G: 12.5 SOLUTION INTRAVENOUS at 11:12

## 2024-03-13 RX ADMIN — ACETAMINOPHEN 1000 MG: 500 TABLET, FILM COATED ORAL at 11:07

## 2024-03-13 RX ADMIN — DOCUSATE SODIUM 100 MG: 100 CAPSULE, LIQUID FILLED ORAL at 05:46

## 2024-03-13 RX ADMIN — POTASSIUM CHLORIDE 10 MEQ: 1500 TABLET, EXTENDED RELEASE ORAL at 18:13

## 2024-03-13 RX ADMIN — OXYCODONE 5 MG: 5 TABLET ORAL at 06:26

## 2024-03-13 RX ADMIN — MAGNESIUM SULFATE IN DEXTROSE 1 G: 10 INJECTION, SOLUTION INTRAVENOUS at 05:51

## 2024-03-13 RX ADMIN — OXYCODONE 5 MG: 5 TABLET ORAL at 21:44

## 2024-03-13 RX ADMIN — ENOXAPARIN SODIUM 40 MG: 100 INJECTION SUBCUTANEOUS at 18:14

## 2024-03-13 RX ADMIN — ACETAMINOPHEN 1000 MG: 500 TABLET, FILM COATED ORAL at 23:44

## 2024-03-13 RX ADMIN — LEVOTHYROXINE SODIUM 125 MCG: 0.12 TABLET ORAL at 05:45

## 2024-03-13 RX ADMIN — DOCUSATE SODIUM 50 MG AND SENNOSIDES 8.6 MG 1 TABLET: 8.6; 5 TABLET, FILM COATED ORAL at 20:32

## 2024-03-13 RX ADMIN — OXYCODONE 5 MG: 5 TABLET ORAL at 03:54

## 2024-03-13 RX ADMIN — OMEPRAZOLE 20 MG: 20 CAPSULE, DELAYED RELEASE ORAL at 05:47

## 2024-03-13 RX ADMIN — ACETAMINOPHEN 1000 MG: 500 TABLET, FILM COATED ORAL at 18:13

## 2024-03-13 RX ADMIN — DOCUSATE SODIUM 100 MG: 100 CAPSULE, LIQUID FILLED ORAL at 18:13

## 2024-03-13 RX ADMIN — FUROSEMIDE 20 MG: 10 INJECTION INTRAMUSCULAR; INTRAVENOUS at 16:01

## 2024-03-13 RX ADMIN — Medication 1 APPLICATOR: at 20:32

## 2024-03-13 RX ADMIN — MENTHOL AND METHYL SALICYLATE: 7.6; 29 OINTMENT TOPICAL at 08:37

## 2024-03-13 RX ADMIN — OXYCODONE 5 MG: 5 TABLET ORAL at 18:14

## 2024-03-13 RX ADMIN — CLOPIDOGREL BISULFATE 75 MG: 75 TABLET ORAL at 05:48

## 2024-03-13 RX ADMIN — POTASSIUM CHLORIDE 10 MEQ: 1500 TABLET, EXTENDED RELEASE ORAL at 05:49

## 2024-03-13 RX ADMIN — AMIODARONE HYDROCHLORIDE 1 MG/MIN: 1.8 INJECTION, SOLUTION INTRAVENOUS at 03:51

## 2024-03-13 RX ADMIN — ACETAMINOPHEN 1000 MG: 500 TABLET, FILM COATED ORAL at 05:48

## 2024-03-13 RX ADMIN — OXYCODONE 5 MG: 5 TABLET ORAL at 11:07

## 2024-03-13 RX ADMIN — AMIODARONE HYDROCHLORIDE 0.5 MG/MIN: 1.8 INJECTION, SOLUTION INTRAVENOUS at 20:10

## 2024-03-13 ASSESSMENT — PAIN DESCRIPTION - PAIN TYPE
TYPE: ACUTE PAIN
TYPE: SURGICAL PAIN
TYPE: ACUTE PAIN
TYPE: ACUTE PAIN
TYPE: SURGICAL PAIN

## 2024-03-13 ASSESSMENT — COGNITIVE AND FUNCTIONAL STATUS - GENERAL
MOVING FROM LYING ON BACK TO SITTING ON SIDE OF FLAT BED: A LITTLE
MOBILITY SCORE: 22
MOBILITY SCORE: 21
SUGGESTED CMS G CODE MODIFIER MOBILITY: CJ
TURNING FROM BACK TO SIDE WHILE IN FLAT BAD: A LITTLE
SUGGESTED CMS G CODE MODIFIER MOBILITY: CJ
DAILY ACTIVITIY SCORE: 24
MOVING FROM LYING ON BACK TO SITTING ON SIDE OF FLAT BED: A LITTLE
CLIMB 3 TO 5 STEPS WITH RAILING: A LITTLE
SUGGESTED CMS G CODE MODIFIER DAILY ACTIVITY: CH
CLIMB 3 TO 5 STEPS WITH RAILING: A LITTLE

## 2024-03-13 ASSESSMENT — ENCOUNTER SYMPTOMS
BLOOD IN STOOL: 0
FOCAL WEAKNESS: 0
BRUISES/BLEEDS EASILY: 0
BACK PAIN: 1
SORE THROAT: 0
COUGH: 0
ABDOMINAL PAIN: 0
CHILLS: 0
FEVER: 0
PALPITATIONS: 0
ORTHOPNEA: 0
HEADACHES: 0
SPUTUM PRODUCTION: 0
VOMITING: 0
NAUSEA: 0
HEMOPTYSIS: 0
EYES NEGATIVE: 1
SHORTNESS OF BREATH: 0
NERVOUS/ANXIOUS: 0

## 2024-03-13 ASSESSMENT — FIBROSIS 4 INDEX: FIB4 SCORE: 2.34

## 2024-03-13 ASSESSMENT — GAIT ASSESSMENTS
GAIT LEVEL OF ASSIST: CONTACT GUARD ASSIST
ASSISTIVE DEVICE: FRONT WHEEL WALKER
DISTANCE (FEET): 175

## 2024-03-13 NOTE — PROGRESS NOTES
Critical Care Progress Note    Date of admission  3/7/2024    Chief Complaint  63 y.o. male w/PMHx HTN, DLD, DM who presented 3/7/2024 with chest pain which was relieved with aspirin heparin.  Troponin was elevated and angiogram revealed multivessel disease.  Patient seen by CVS and is taken electively to the operating room for three-vessel CABG which was performed today.  No complications intraoperatively.  V wires functional but did not require pacing.  Mild hypotension on low-dose norepinephrine infusion.  Blood sugar a bit labile currently on 1 unit of insulin.  Sedated with dexmedetomidine at 0.5 but still somnolent and not moving because he was not reversed.  Initial blood gas post op 7.42/37/247.  CXR not released yet but anesthesia did see the film and felt tubes and lines were in good position without significant edema, atelectasis or pneumothorax.  Case reviewed at length at the bedside with cardiac surgery and cardiac anesthesia.   ([facility] HPI 3/11)    Hospital Course  No notes on file    Interval Problem Update  Reviewed last 24 hour events:    Case reviewed with CVS at the bedside    A&O 4  Concern of right-sided low back paraspinous muscle pain and knot which RN applied a heat pack to and that seems to be helping, requesting IcyHot  Sternal CP better denies palpitations  AF RVR last night - Amiodarone bolus and drip started  Work of breathing low  SR 80-90s  SBP 90-100s, no pressors-weaned off clevidipine  UO adequate, fluid balance neutral  Furosemide 20 mg IV every 12  WOB low, O2 weaned to 1 L nasal cannula, O2 sats 90-93%  Tmax 100, WBC 16  Hemoglobin 11,   Electrolytes grossly normal, sodium 134  Glucose well-controlled 139-179  MAR reviewed: Amiodarone, ASA/clopidogrel, atorvastatin, Lovenox, levothyroxine, metoprolol, omeprazole    IV amiodarone infusion, rebolus as needed  Optimize electrolytes, serial magnesium and potassium in particular  PT for back trouble as needed  Mobilize as  tolerated  Heat/stretching  Topical therapy as clinically appropriate-patient requesting IcyHot  TSH 0.29 mildly suppressed will repeat TSH and check free T3 and free T4 levels.       YESTERDAY  A&O x4  Pain controlled with oxycodone  Some nausea but no vomiting  No shortness of breath  SR 70s  SBP 90-110s  Weaned off norepinephrine now on low-dose clevidipine  UO adequate  Work of breathing well, extubated yesterday afternoon  I-S 1000 cc  CXR: ATX/effusion left base, right subclavian  Tmax 99.0, WBC 14.0  Hemoglobin 12.2,   Bicarb 18, AG 15, normal renal function, K4.2  Glucose: 179, 159, 152, 160, 162, 142  Insulin infusion discontinued, SSI/hypoglycemia protocols  Omeprazole  Metoprolol  Levothyroxine  DAPT, atorvastatin, enoxaparin      Review of Systems  Review of Systems   Constitutional:  Negative for chills, fever and malaise/fatigue.   HENT:  Negative for congestion and sore throat.    Eyes: Negative.    Respiratory:  Negative for cough, hemoptysis, sputum production and shortness of breath.    Cardiovascular:  Positive for chest pain (Incisional-improved). Negative for palpitations, orthopnea and leg swelling (Improved).   Gastrointestinal:  Negative for abdominal pain, blood in stool, nausea and vomiting.   Genitourinary:  Negative for dysuria and hematuria.   Musculoskeletal:  Positive for back pain (Right-sided mid/upper lumbar thoracic, mild).   Neurological:  Negative for focal weakness and headaches.   Endo/Heme/Allergies:  Does not bruise/bleed easily.   Psychiatric/Behavioral:  The patient is not nervous/anxious.         Vital Signs for last 24 hours   Temp:  [37.1 °C (98.8 °F)-37.8 °C (100 °F)] 37.5 °C (99.5 °F)  Pulse:  [] 72  Resp:  [20-22] 20  BP: ()/(50-67) 85/54  SpO2:  [89 %-96 %] 93 %    Hemodynamic parameters for last 24 hours       Respiratory Information for the last 24 hours       Physical Exam   Physical Exam  Vitals reviewed.   Constitutional:       Appearance: He  is not ill-appearing.   HENT:      Head: Normocephalic and atraumatic.      Mouth/Throat:      Mouth: Mucous membranes are moist.   Eyes:      General: No scleral icterus.     Extraocular Movements: Extraocular movements intact.      Pupils: Pupils are equal, round, and reactive to light.   Cardiovascular:      Rate and Rhythm: Normal rate and regular rhythm.      Pulses: Normal pulses.      Heart sounds: No murmur heard.     No gallop.   Pulmonary:      Effort: No respiratory distress.      Breath sounds: No wheezing, rhonchi or rales.   Abdominal:      General: Bowel sounds are normal.      Palpations: Abdomen is soft.      Tenderness: There is no abdominal tenderness. There is no right CVA tenderness, left CVA tenderness or guarding.   Musculoskeletal:      Cervical back: Neck supple.      Lumbar back: Spasms (Right lower lumbar) and tenderness (Right lower lumbar paraspinous) present.      Right lower leg: No edema.      Comments: R sided paraspinous just above the hip spasm and tenderness hot pack in place   Lymphadenopathy:      Cervical: No cervical adenopathy.   Skin:     General: Skin is warm and dry.      Capillary Refill: Capillary refill takes less than 2 seconds.   Neurological:      General: No focal deficit present.      Mental Status: He is alert and oriented to person, place, and time. Mental status is at baseline.   Psychiatric:         Mood and Affect: Mood normal.         Behavior: Behavior normal.         Thought Content: Thought content normal.         Medications  Current Facility-Administered Medications   Medication Dose Route Frequency Provider Last Rate Last Admin    amiodarone (Nexterone) 360 mg/200 mL infusion  0.5 mg/min Intravenous Continuous MEJIA RangelP.R.N. 16.7 mL/hr at 03/13/24 0914 0.5 mg/min at 03/13/24 0914    amiodarone (Cordarone) tablet 400 mg  400 mg Oral BID MEJIA RangelP.R.N.        Icy Hot [place] Extra Strength 7.6-29 % topical ointment   Topical PRN Jose RASHEED  GORGE Billy   Given at 03/13/24 0837    AMIODARONE HCL IN DEXTROSE 360-4.14 MG/200ML-% IV SOLN             cyclobenzaprine (Flexeril) tablet 10 mg  10 mg Oral TID PRN MEJIA RangelP.R.N.   10 mg at 03/13/24 1214    furosemide (Lasix) injection 20 mg  20 mg Intravenous BID DIURETIC Chip Johnson P.A.-C.   20 mg at 03/13/24 0627    potassium chloride SA (Kdur) tablet 10 mEq  10 mEq Oral BID Chip Johnson P.A.-C.   10 mEq at 03/13/24 0549    insulin regular (HumuLIN R,NovoLIN R) injection  2-9 Units Subcutaneous 4X/DAY ACHS Salas Chapman, D.O.        And    dextrose 50% (D50W) injection 25 g  25 g Intravenous Q15 MIN PRN Salas Chapman, D.O.        norepinephrine (Levophed) 8 mg in 250 mL NS infusion (premix)  0-1 mcg/kg/min (Ideal) Intravenous Continuous Salas Sethit, D.O.   Stopped at 03/12/24 0018    EPINEPHrine (Adrenalin) infusion 4 mg/250 mL (premix)  0-0.5 mcg/kg/min (Ideal) Intravenous Continuous Salas Sethit, D.O.   Dose not Required at 03/11/24 1245    Respiratory Therapy Consult   Nebulization Continuous RT LIVAN Rangel.P.R.N.        NS infusion   Intravenous Continuous LIVAN Rangel.P.R.N.   Stopped at 03/11/24 2041    NS infusion   Intravenous Continuous LIVAN Rangel.P.R.N. 30 mL/hr at 03/11/24 1249 Rate Change at 03/11/24 1249    electrolyte-A (Plasmalyte-A) infusion   Intravenous PRN LIVAN Rangel.P.R.N. 999 mL/hr at 03/11/24 1558 New Bag at 03/11/24 1558    Nozin nasal  swab  1 Applicator Each Nostril BID LIVAN Rangel.P.R.N.   1 Applicator at 03/13/24 0838    metoprolol tartrate (Lopressor) tablet 25 mg  25 mg Oral BID Monty Gallego, A.P.R.N.   25 mg at 03/13/24 0547    aspirin EC tablet 81 mg  81 mg Oral DAILY Monty Gallego, A.P.R.N.   81 mg at 03/13/24 0547    clopidogrel (Plavix) tablet 75 mg  75 mg Oral DAILY Monty Gallego, A.P.R.N.   75 mg at 03/13/24 0548    atorvastatin (Lipitor) tablet 40 mg  40 mg Oral QHS Monty Gallego, A.P.R.N.    40 mg at 03/12/24 2007    clevidipine (Cleviprex) IV emulsion  0-21 mg/hr Intravenous Continuous Monty Gallego, A.P.R.N.   Stopped at 03/12/24 1100    nitroglycerin 50 mg in D5W 250 ml infusion  0-100 mcg/min Intravenous Continuous Monty Gallego, A.P.R.N.   Dose not Required at 03/11/24 1245    Pharmacy Consult Request ...Pain Management Review 1 Each  1 Each Other PHARMACY TO DOSE Monty Gallego, A.P.R.N.        acetaminophen (Tylenol) tablet 1,000 mg  1,000 mg Oral Q6HRS Monty Gallego, A.P.R.N.   1,000 mg at 03/13/24 1107    Followed by    [START ON 3/21/2024] acetaminophen (Tylenol) tablet 1,000 mg  1,000 mg Oral Q6HRS PRN Monty Gallego, A.P.R.N.        oxyCODONE immediate-release (Roxicodone) tablet 5 mg  5 mg Oral Q3HRS PRN Monty Gallego, A.P.R.N.   5 mg at 03/13/24 1107    Or    oxyCODONE immediate release (Roxicodone) tablet 10 mg  10 mg Oral Q3HRS PRN Monty Gallego, A.P.R.N.        Or    fentaNYL (Sublimaze) injection 50 mcg  50 mcg Intravenous Q3HRS PRN Monty Gallego, A.P.R.N.        traMADol (Ultram) 50 MG tablet 50 mg  50 mg Oral Q4HRS PRN Monty Gallego, A.P.R.N.        sodium bicarbonate 8.4 % injection 50 mEq  50 mEq Intravenous Q HOUR PRN Monty Gallego, A.P.R.N.        ondansetron (Zofran) syringe/vial injection 8 mg  8 mg Intravenous Q6HRS PRN Monty Gallego, A.P.R.N.        Or    prochlorperazine (Compazine) injection 10 mg  10 mg Intravenous Q6HRS PRN Monty Gallego, A.P.R.N.        acetaminophen (Tylenol) tablet 650 mg  650 mg Oral Q4HRS PRN Monty Gallego, A.P.R.N.        Or    acetaminophen (Tylenol) suppository 650 mg  650 mg Rectal Q4HRS PRN Monty Gallego A.P.R.N.        docusate sodium (Colace) capsule 100 mg  100 mg Oral BID Monty Gallego A.P.R.N.   100 mg at 03/13/24 0546    senna-docusate (Pericolace Or Senokot S) 8.6-50 MG per tablet 1 Tablet  1 Tablet Oral Nightly Monty Gallego A.P.R.N.   1 Tablet at 03/12/24 2006    senna-docusate (Pericolace Or Senokot S) 8.6-50 MG per tablet 1 Tablet  1  Tablet Oral Q24HRS PRN Monty Gallego A.P.R.N.        polyethylene glycol/lytes (Miralax) Packet 1 Packet  1 Packet Oral BID PRN Monty Gallego A.P.R.N.        magnesium hydroxide (Milk Of Magnesia) suspension 30 mL  30 mL Oral QDAY PRN Monty Gallego A.P.R.N.        bisacodyl (Dulcolax) suppository 10 mg  10 mg Rectal Q24HRS PRN Monty Gallego A.P.R.N.        sodium phosphate (Fleet) enema 133 mL  1 Each Rectal Once PRN Monty Gallego A.P.R.N.        omeprazole (PriLOSEC) capsule 20 mg  20 mg Oral DAILY Monty Gallego A.P.R.N.   20 mg at 03/13/24 0547    mag hydrox-al hydrox-simeth (Maalox Plus Es Or Mylanta Ds) suspension 30 mL  30 mL Oral Q4HRS PRN Monty Gallego A.P.R.N.        diphenhydrAMINE (Benadryl) tablet/capsule 25 mg  25 mg Oral HS PRN - MR X 1 Monty Gallego A.P.R.N.   25 mg at 03/12/24 0012    enoxaparin (Lovenox) inj 40 mg  40 mg Subcutaneous DAILY AT 1800 Monty Gallego, A.P.R.N.   40 mg at 03/12/24 1730    levothyroxine (Synthroid) tablet 125 mcg  125 mcg Oral QAM Monty Gallego A.P.R.N.   125 mcg at 03/13/24 0545       Fluids    Intake/Output Summary (Last 24 hours) at 3/13/2024 1447  Last data filed at 3/13/2024 0800  Gross per 24 hour   Intake 627.22 ml   Output 850 ml   Net -222.78 ml       Laboratory  Recent Labs     03/11/24  1345 03/11/24  1539 03/11/24  1737   ISTATAPH 7.519* 7.469 7.514*   ISTATAPCO2 25.8* 29.2 25.0*   ISTATAPO2 94* 135* 83   ISTATATCO2 22 22 21   HFHEDLF8FNV 98 99 97   ISTATARTHCO3 21.0 21.2 20.1   ISTATARTBE 0 -1 -1   ISTATTEMP 36.6 C 37.0 C 37.2 C   ISTATFIO2 70 50 30   ISTATSPEC Arterial Arterial Arterial   ISTATAPHTC 7.525* 7.469 7.511*   [id number] 92* 135* 84         Recent Labs     03/11/24  0100 03/11/24  1227 03/11/24  1730 03/12/24  0007 03/12/24  0410 03/13/24  0010   SODIUM 135  --   --  140  --  134*   POTASSIUM 3.7 4.2   < > 4.2 4.2 4.4   CHLORIDE 103  --   --  107  --  102   CO2 21  --   --  18*  --  23   BUN 13  --   --  10  --  19   CREATININE 0.82  --    --  0.80  --  0.91   MAGNESIUM  --  3.2*  --   --   --  2.3   CALCIUM 8.7  --   --  7.7*  --  8.3*    < > = values in this interval not displayed.     Recent Labs     03/11/24  0100 03/12/24  0007 03/13/24  0010   GLUCOSE 172* 154* 136*     Recent Labs     03/11/24  0100 03/12/24  0007 03/13/24  0010   WBC 10.9* 14.0* 16.0*   NEUTSPOLYS 40.80*  --   --    LYMPHOCYTES 42.60*  --   --    MONOCYTES 10.80  --   --    EOSINOPHILS 4.20  --   --    BASOPHILS 1.10  --   --      Recent Labs     03/11/24  0100 03/11/24  1227 03/12/24  0007 03/13/24  0010   RBC 3.98*  --  3.51* 3.17*   HEMOGLOBIN 13.7* 13.6* 12.2* 11.0*   HEMATOCRIT 40.0* 38.0* 36.0* 32.5*   PLATELETCT 228 126* 153* 145*   PROTHROMBTM  --  16.2*  --   --    APTT  --  30.5  --   --    INR  --  1.28*  --   --        Imaging  X-Ray:  I have personally reviewed the images and compared with prior images.  EKG:  I have personally reviewed the images and compared with prior images.  Echo:   Reviewed    Assessment/Plan  * Encounter for weaning from ventilator ([facility])  Assessment & Plan  Intubated electively with grade 1 airway 3/11  Extubated 3/11  Mild hypoxemia on low-flow nasal cannula O2  RT/O2 protocols  Mobilize as dysrhythmias allow  Incentive spirometry encouraged  Forced diuresis as clinically prudent  Remains on low-flow O2, follow-up chest x-ray tomorrow    Atrial fibrillation with RVR ([facility])  Assessment & Plan  Postop A-fib with RVR, rate now controlled  Status post amiodarone bolus and infusion  Rebolus as clinically appropriate  Initiate oral therapy 40 mg every 12  Optimize electrolytes, ideally K greater than 4 and mag greater than 2  With suppressed TSH check free T3 and free T4 levels along with repeating TSH  Echocardiogram noted  Monitor need for anticoagulation defer to timing postoperatively in this cardiac surgery patient to CVS team    S/P CABG x 3  Assessment & Plan  Elective three-vessel CABG 3/11 by Dr. Chapman  Actively titrating  norepinephrine for hypotension  Transition to clevidipine as needed for hypertension  Goal BP   Monitoring CVP  V Wires present and tested, has not required pacing, and A-fib, keep pacer wires now  Mediastinal chest tubes with no air leak per CVS-remove tubes as per CVS  Anticoagulation/antiplatelets as per CVS postop  Dexmedetomidine infusion for sedation weaned off  Forced diuresis as clinically appropriate-IV furosemide to optimize electrolytes  GDMT    ACS (acute coronary syndrome) (HCC)- (present on admission)  Assessment & Plan  Admitted with NSTEMI/ACS  Three-vessel disease by angiogram  CABG three-vessel 3/11  GDMT  Statin/DAPT-cardiac rehab after discharge    Type 2 diabetes mellitus, without long-term current use of insulin (HCC)- (present on admission)  Assessment & Plan  Continuous insulin infusion protocol x 24 hours post CABG  Transition to SSI/glargine as clinically appropriate  Post levels 138-179  Continue to hold metformin    Primary hypertension- (present on admission)  Assessment & Plan  Hold home regimen for now  Continue metoprolol adjust dose based on heart rate and blood pressure especially now with A-fib RVR  Titrating clevidipine as needed  Consider adding ACE inhibitor    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  Reviewed prior to discharge from hospital    Hypothyroid- (present on admission)  Assessment & Plan  Resume home levothyroxine, clinically appears euthyroid  Recent TSH borderline depressed at 0.29, follow-up as an outpatient   With A-fib RVR will check free T3 and T4 levels and repeat TSH    Hyperlipidemia- (present on admission)  Assessment & Plan  Continue atorvastatin     History of Hodgkin's lymphoma- (present on admission)  Assessment & Plan  History of         VTE:  Lovenox  Ulcer: PPI  Lines: Central Line  Ongoing indication addressed and Baca Catheter  Ongoing indication addressed    I have performed a  physical exam and reviewed and updated ROS and Plan today (3/13/2024). In review of yesterday's note (3/12/2024), there are no changes except as documented above.     Discussed patient condition and risk of morbidity and/or mortality with Family, RN, RT, Pharmacy, Charge nurse / hot rounds, Patient, and CVS    The patient remains critically ill.  Critical care time = 45 minutes in directly providing and coordinating critical care and extensive data review.  No time overlap and excludes procedures.

## 2024-03-13 NOTE — ASSESSMENT & PLAN NOTE
Postop A-fib with RVR, back in sinus rhythm  Status post amiodarone bolus and infusion  Rebolus as clinically appropriate  Continue oral therapy 400 mg every 12, transition to maintenance dose when load complete  Metoprolol 25 twice daily  Optimize electrolytes, ideally K greater than 4 and mag greater than 2  Function studies okay  Echocardiogram noted  Monitor need for anticoagulation defer to timing postoperatively in this cardiac surgery patient to CVS team

## 2024-03-13 NOTE — PROGRESS NOTES
4 Eyes Skin Assessment Completed by SANTOSH Covarrubias and SANTOSH Sofia.    Head WDL  Ears Redness and Blanching  Nose WDL  Mouth WDL  Neck WDL  Breast/Chest Midline Incision  Shoulder Blades WDL  Spine WDL  (R) Arm/Elbow/Hand Bruising and Scab  (L) Arm/Elbow/Hand Bruising and Scab  Abdomen CT Incisions  Groin WDL  Scrotum/Coccyx/Buttocks WDL  (R) Leg Bruising, Edema, and Incision  (L) Leg Bruising, Edema, and Incision  (R) Heel/Foot/Toe Edema  (L) Heel/Foot/Toe Edema          Devices In Places Tele Box, Blood Pressure Cuff, Pulse Ox, Baca, SCD's, Central Line, Pacer, Nasal Cannula, and MAREN's      Interventions In Place Gray Ear Foams, NC W/Ear Foams, Chair Waffle, Pillows, Low Air Loss Mattress, and Pressure Redistribution Mattress    Possible Skin Injury No    Pictures Uploaded Into Epic N/A  Wound Consult Placed N/A  RN Wound Prevention Protocol Ordered No

## 2024-03-13 NOTE — THERAPY
Physical Therapy   Initial Evaluation     Patient Name: Julian Christine  Age:  63 y.o., Sex:  male  Medical Record #: [id number]  Today's Date: 3/13/2024     Precautions  Precautions: Fall Risk;Cardiac Precautions (See Comments);Sternal Precautions (See Comments)    Assessment  Patient is 63 y.o. male s/p CABG x 4.  Additional factors influencing patient status / progress : pt continues to do well, up walking this am with nsg and motivated to walk again with therapy. Sternal precautions reviewed again, and pt/wife report no additional questions after education done yesterday, wife reading packet. Pt needs cg with transfers and ambulation with FWW, see details below. Pt will need to trial bed mobility with flat bed and 2 stairs before dc to home.       Plan    Physical Therapy Initial Treatment Plan   Treatment Plan : Bed Mobility, Gait Training, Neuro Re-Education / Balance, Stair Training, Therapeutic Activities, Therapeutic Exercise  Treatment Frequency: 4 Times per Week  Duration: Until Therapy Goals Met    DC Equipment Recommendations: Front-Wheel Walker (depending on progress.)  Discharge Recommendations: Recommend home health for continued physical therapy services (may not need HH, depends on progress.)          Objective       03/13/24 1152   Charge Group   PT Evaluation PT Evaluation High   Total Time Spent   PT Total Time Yes   PT Evaluation Time Spent (Mins) 30   PT Total Time Spent (Calculated) 30   Initial Contact Note    Initial Contact Note Order Received and Verified, Physical Therapy Evaluation in Progress with Full Report to Follow.   Precautions   Precautions Fall Risk;Cardiac Precautions (See Comments);Sternal Precautions (See Comments)   Vitals   Blood Pressure 95/55  (sitting at rest, 93/60 standing, 120/67 in chair after ambulation.)   Pulse Oximetry 91 %  (sitting, 97 standing, then unable to obtain reading during ambulation, fingers blanched, nsg updated.)   O2 Delivery Device Silicone Nasal  Cannula   Pain 0 - 10 Group   Therapist Pain Assessment During Activity;Nurse Notified  (no c/o pain with activity)   Prior Living Situation   Prior Services None   Housing / Facility 1 Story House   Steps Into Home 2   Steps In Home 0   Equipment Owned None   Lives with - Patient's Self Care Capacity Spouse  (Ruby, home and can help as needed)   Prior Level of Functional Mobility   Bed Mobility Independent   Transfer Status Independent   Ambulation Independent   Ambulation Distance community   Assistive Devices Used None   Stairs Independent   Cognition    Cognition / Consciousness WDL   Level of Consciousness Alert   Comments motivated, eager to walk.   Active ROM Lower Body    Active ROM Lower Body  WDL   Strength Lower Body   Lower Body Strength  WDL   Comments good control with sit to stand.   Balance Assessment   Sitting Balance (Static) Fair   Sitting Balance (Dynamic) Fair   Standing Balance (Static) Fair -   Standing Balance (Dynamic) Fair -   Weight Shift Sitting Good   Weight Shift Standing Good   Comments with FWW   Bed Mobility    Supine to Sit   (up chair pre and post)   Scooting Supervised  (seated scoot in chair)   Gait Analysis   Gait Level Of Assist Contact Guard Assist   Assistive Device Front Wheel Walker   Distance (Feet) 175   # of Times Distance was Traveled 1   Deviation   (slow, steady pace)   Comments attempted pulse ox readings throughout, but unable to read. Nsg updated.   Functional Mobility   Sit to Stand Contact Guard Assist   Bed, Chair, Wheelchair Transfer Contact Guard Assist   Transfer Method Stand Pivot   6 Clicks Assessment - How much HELP from from another person do you currently need... (If the patient hasn't done an activity recently, how much help from another person do you think he/she would need if he/she tried?)   Turning from your back to your side while in a flat bed without using bedrails? 3   Moving from lying on your back to sitting on the side of a flat bed  without using bedrails? 3   Moving to and from a bed to a chair (including a wheelchair)? 4   Standing up from a chair using your arms (e.g., wheelchair, or bedside chair)? 4   Walking in hospital room? 4   Climbing 3-5 steps with a railing? 3   6 clicks Mobility Score 21   Activity Tolerance   Sitting in Chair 20+   Standing 6   Short Term Goals    Short Term Goal # 1 Pt will perform bed mobility with flat bed, no rail with supervision in 6 visits   Short Term Goal # 2 Pt will transfer with supervision in 6 visits to improve functional indep.   Short Term Goal # 3 Pt will ambulate x 400 feet using FWW vs no AD with supervision in 6 visits to improve functional indep.   Short Term Goal # 4 Pt will negotiate 2 stairs with supervision in 6 visits to access home.   Education Group   Sternal Precautions Patient Response Patient;Family;Acceptance;Explanation;Verbal Demonstration   Additional Comments review of sternal precautions. Pt and wife report no questions after education done yesterday.   Physical Therapy Initial Treatment Plan    Treatment Plan  Bed Mobility;Gait Training;Neuro Re-Education / Balance;Stair Training;Therapeutic Activities;Therapeutic Exercise   Treatment Frequency 4 Times per Week   Duration Until Therapy Goals Met   Problem List    Problems Impaired Bed Mobility;Impaired Transfers;Impaired Ambulation;Decreased Activity Tolerance;Limited Knowledge of Post-Op Precautions   Anticipated Discharge Equipment and Recommendations   DC Equipment Recommendations Front-Wheel Walker  (depending on progress.)   Discharge Recommendations Recommend home health for continued physical therapy services  (may not need HH, depends on progress.)   Interdisciplinary Plan of Care Collaboration   IDT Collaboration with  Nursing   Patient Position at End of Therapy Seated;Call Light within Reach;Tray Table within Reach;Phone within Reach;Family / Friend in Room   Collaboration Comments nsg updated, may need new SpO2  probe   Session Information   Date / Session Number  3/13-1 (1/4, 3/19)

## 2024-03-13 NOTE — PROGRESS NOTES
Cardiovascular Surgery Progress Note    Name: Julian Christine  MRN: [id number]  : 1960  Admit Date: 3/7/2024 10:02 AM  2 Days Post-Op     Procedure:  Procedure(s) and Anesthesia Type:     * CABG X4, WITH ENDOSCOPIC VEIN PROCUREMENT - General     * ECHOCARDIOGRAM, TRANSESOPHAGEAL, INTRAOPERATIVE - General    Vitals:  Vitals:    24 0626 24 0650 24 0700 24 0800   BP: 115/67  (!) 86/57 (!) 88/55   Pulse: (!) 106 95 88 80   Resp:  (!) 22     Temp:   37.3 °C (99.1 °F) 37.5 °C (99.5 °F)   TempSrc:   Bladder    SpO2: 91% 96% 90% 96%   Weight:       Height:          Temp (24hrs), Av.4 °C (99.3 °F), Min:37 °C (98.6 °F), Max:37.8 °C (100 °F)      Respiratory:    Respiration: (!) 22, Pulse Oximetry: 96 %       Fluids:    Intake/Output Summary (Last 24 hours) at 3/13/2024 0916  Last data filed at 3/13/2024 0600  Gross per 24 hour   Intake 812.57 ml   Output 830 ml   Net -17.43 ml     Admit weight: Weight: 77.1 kg (170 lb)  Current weight: Weight: 78.7 kg (173 lb 8 oz) (24 0600)    Labs:  Recent Labs     24  0100 24  1227 24  0007 24  0010   WBC 10.9*  --  14.0* 16.0*   RBC 3.98*  --  3.51* 3.17*   HEMOGLOBIN 13.7* 13.6* 12.2* 11.0*   HEMATOCRIT 40.0* 38.0* 36.0* 32.5*   .5*  --  102.6* 102.5*   MCH 34.4*  --  34.8* 34.7*   MCHC 34.3  --  33.9 33.8   RDW 49.2  --  51.8* 52.5*   PLATELETCT 228 126* 153* 145*   MPV 11.3  --  11.8 12.0     Recent Labs     24  0100 24  1227 24  0007 24  0410 24  0010   SODIUM 135  --  140  --  134*   POTASSIUM 3.7   < > 4.2 4.2 4.4   CHLORIDE 103  --  107  --  102   CO2 21  --  18*  --  23   GLUCOSE 172*  --  154*  --  136*   BUN 13  --  10  --  19   CREATININE 0.82  --  0.80  --  0.91   CALCIUM 8.7  --  7.7*  --  8.3*    < > = values in this interval not displayed.     Recent Labs     24  1227   APTT 30.5   INR 1.28*       HgbA1c:  6.1     Diabetic Educator  Consult:  no    Medications:  Scheduled Medications   Medication Dose Frequency    amiodarone infusion  1 mg/min Once    amiodarone  400 mg BID    amiodarone       furosemide  20 mg BID DIURETIC    potassium chloride SA  10 mEq BID    insulin regular  2-9 Units 4X/DAY ACHS    Nozin nasal  swab  1 Applicator BID    metoprolol tartrate  25 mg BID    aspirin  81 mg DAILY    clopidogrel  75 mg DAILY    atorvastatin  40 mg QHS    Pharmacy Consult Request  1 Each PHARMACY TO DOSE    acetaminophen  1,000 mg Q6HRS    docusate sodium  100 mg BID    senna-docusate  1 Tablet Nightly    omeprazole  20 mg DAILY    enoxaparin (LOVENOX) injection  40 mg DAILY AT 1800    levothyroxine  125 mcg QAM        Exam:   Physical Exam  Vitals and nursing note reviewed.   Constitutional:       General: He is not in acute distress.  HENT:      Head: Normocephalic.      Right Ear: External ear normal.      Left Ear: External ear normal.      Nose: Nose normal.      Mouth/Throat:      Mouth: Mucous membranes are moist.   Eyes:      Pupils: Pupils are equal, round, and reactive to light.   Cardiovascular:      Rate and Rhythm: Normal rate.      Pulses: Normal pulses.   Pulmonary:      Effort: Pulmonary effort is normal.   Abdominal:      General: Abdomen is flat. There is no distension.      Palpations: Abdomen is soft.   Musculoskeletal:         General: Normal range of motion.      Cervical back: Normal range of motion.      Right lower leg: Edema present.      Left lower leg: Edema present.   Skin:     General: Skin is warm and dry.      Comments: Sternotomy: with island dressing CDI  SVG site: with ace wrap, CDI   Neurological:      Mental Status: He is alert and oriented to person, place, and time.   Psychiatric:         Mood and Affect: Mood normal.         Behavior: Behavior normal.         Judgment: Judgment normal.         Cardiac Medications:    ASA - Yes    Plavix - Yes    Post-operative Beta Blockers - Yes    Ace/ARB- No;  contraindicated because of Normal EF    Statin - Yes    Aldactone- No; contraindicated because of Normal EF    SGLT2i-  No contraindicated because of No; contraindicated because of Normal EF    Ejection Fraction:  70%    Telemetry:   3/12: SR  3/13: SR/aFib    Assessment/Plan:  POD 1  HDS, extubated on 2L NC, off gtts, neuro intact, wounds CDI, abdomen soft non tender, flatus +, BM -, fluid balance negative, wt up,  H/h:12/36, Cr:0.80.  Plan: remove mediastinal chest tubes, keep pacing wires. Remove rice once ambulatory, begin gentle diuresis. Encourage IS/ambulate.      POD 2  Hypotension, SR/converted to Afib overnight, remains in afib, on amio gtt, neuro intact, wounds CDI, abdomen soft non tender, +flatus, -BM, fluid balance negative, wt up from admit,  H/h: 11/32.5; Cr:0.91.  Plan: replete 1g mag,  25g albumin, remove rice, encourage IS/ambulate. Keep pacing wires. Keep CIC 1 more day.     Disposition:  TBD

## 2024-03-13 NOTE — CARE PLAN
The patient is Watcher - Medium risk of patient condition declining or worsening    Shift Goals  Clinical Goals: Wean vasoactive medications, hemodynamic stability  Patient Goals: Rest  Family Goals: Get well and go home    Progress made toward(s) clinical / shift goals:    Problem: Knowledge Deficit - Standard  Goal: Patient and family/care givers will demonstrate understanding of plan of care, disease process/condition, diagnostic tests and medications  Outcome: Progressing     Problem: Pain - Standard  Goal: Alleviation of pain or a reduction in pain to the patient’s comfort goal  Outcome: Progressing     Problem: Post Op Day 1 CABG/Heart Valve Replacement  Goal: Optimal care of the post op CABG/heart valve replacement Post Op Day 1  Outcome: Progressing  Intervention: EKG and CXR completed  Note: completed  Intervention: Antibiotics are discontinued within 24 hours of anesthesia end time unless indication documented for continuation beyond 24 hours  Note: Antibiotic course completed  Intervention: Daily weights in the morning  Note: Completed by NOC shift  Intervention: Up in chair for all meals  Note: Pt up to chair for entire shift  Intervention: Ambulate in am if stable. First ambulation 25 feet. Repeat x 3 as tolerated  Note: Pt ambulated 20 ft x2  Intervention: Discontinue rice catheter unless documented reason for continuation  Note: Rice catheter left in place per order  Intervention: Assess surgical dressing and check provider orders for potential removal  Note: Surgical dressings removed, site care completed with non-rinse foam and water. JUDY  Intervention: [facility] trained RN to remove chest tubes if ordered by provider  Note: Chest tubes removed per order  Intervention: IS q 1 hour while awake and record best IS volume  Note: Best IS 1000  Intervention: Knee high GARCIA hose, on during the day, off at night  Note: Garcia hose placed   Intervention: Saline lock IV  Note: IV saline locked  Intervention: After  24th hour post-anesthesia end time, transition patient to Cardiac Surgery SQ Insulin Protocol  Note: Pt transitioned to ACHS  Intervention: If patient is CABG or on home beta-blocker, start/resume beta-blocker on POD 1 or POD 2 or document contraindication  Note: Metoprolol given        Patient is not progressing towards the following goals:

## 2024-03-13 NOTE — CARE PLAN
The patient is Stable - Low risk of patient condition declining or worsening    Shift Goals  Clinical Goals: Wean vasoactive medications, hemodynamic stability  Patient Goals: Rest  Family Goals: Get well and go home    Progress made toward(s) clinical / shift goals:    Problem: Pain - Standard  Goal: Alleviation of pain or a reduction in pain to the patient’s comfort goal  Outcome: Progressing  Note: Pain managed with oral analgesics     Problem: Post Op Day 1 CABG/Heart Valve Replacement  Goal: Optimal care of the post op CABG/heart valve replacement Post Op Day 1  Outcome: Progressing  Intervention: EKG and CXR completed  Note: Completed  Intervention: All valve patients: PT/INR daily  Note: N/A  Intervention: Antibiotics are discontinued within 24 hours of anesthesia end time unless indication documented for continuation beyond 24 hours  Note: Abx completed and D/C'd  Intervention: Daily weights in the morning  Note: Pt being weighed daily  Intervention: Up in chair for all meals  Note: Pt was up in chair for all meals  Intervention: Ambulate in am if stable. First ambulation 25 feet. Repeat x 3 as tolerated  Note: Pt ambulated x 3  Intervention: Discontinue rice catheter unless documented reason for continuation  Note: Rice still in place  Intervention: Assess surgical dressing and check provider orders for potential removal  Note: Dressings removed 24 hour post surgery  Intervention: Ensure referal to intensive cardiac rehab is ordered, and smoking cessation education if appropriate  Note: Completed  Intervention: [facility] trained RN to remove chest tubes if ordered by provider  Note: Chest tubes removed  Intervention: IS q 1 hour while awake and record best IS volume  Note: Pt utilizing IS q 1 hour, best pull: 1000  Intervention: Knee high MAREN hose, on during the day, off at night  Note: MAREN hose on during the day, SCDs on while in bed  Intervention: Saline lock IV  Note: All lines are saline  locked  Intervention: Transfer to tele status, begin VS q 4 hours  Note: N/A  Intervention: After 24th hour post-anesthesia end time, transition patient to Cardiac Surgery SQ Insulin Protocol  Note: Pt on SQ insulin protocol  Intervention: If patient is CABG or on home beta-blocker, start/resume beta-blocker on POD 1 or POD 2 or document contraindication  Note: Pt getting beta blocker       Patient is not progressing towards the following goals:

## 2024-03-13 NOTE — DIETARY
Nutrition Services: Heart Healthy Diet Education Consult   Day 6 of admit.  Julian Christine is a 63 y.o. male with admitting DX of ACS (acute coronary syndrome) (HCC) [I24.9]    RD able to visit pt at bedside to provide heart healthy diet education. RD discussed fiber, sodium, and saturated/unsaturated fats as well as common food sources, provided handout reinforcing topics discussed. Pt demonstrated readiness and evidence of learning. RD able to answer all questions to patient's satisfaction.     No other education needs identified at this time. Consider referral to outpatient nutrition services for continuation of education as indicated or per pt preferences.     Please re-consult RD as indicated.

## 2024-03-13 NOTE — PROGRESS NOTES
4 Eyes Skin Assessment Completed by SANTOSH Alcantara and SANTOSH Stauffer.    Head WDL  Ears Redness and Blanching  Nose WDL  Mouth WDL  Neck WDL  Breast/Chest Incision  Shoulder Blades WDL  Spine WDL  (R) Arm/Elbow/Hand Bruising and Scab  (L) Arm/Elbow/Hand Bruising and Scab  Abdomen Incision  Groin WDL  Scrotum/Coccyx/Buttocks WDL  (R) Leg Bruising and Edema, incision  (L) Leg Bruising and Edema, incision  (R) Heel/Foot/Toe Edema  (L) Heel/Foot/Toe Edema          Devices In Places Tele Box, Blood Pressure Cuff, Pulse Ox, Baca, Nasal Cannula, and MAREN's      Interventions In Place Gray Ear Foams, NC W/Ear Foams, Chair Waffle, Pillows, and Low Air Loss Mattress    Possible Skin Injury No    Pictures Uploaded Into Epic N/A  Wound Consult Placed N/A  RN Wound Prevention Protocol Ordered No

## 2024-03-13 NOTE — DISCHARGE PLANNING
Case Management Discharge Planning    Admission Date: 3/7/2024  GMLOS: 7.5  ALOS: 6    6-Clicks ADL Score: 24  6-Clicks Mobility Score: 21      Anticipated Discharge Dispo: Discharge Disposition: Discharged to home/self care (01)  Discharge Address: [facility] [address]  [facility] [address]  Per chart review pt resides in [place], [place].     Family support:   Name                               Relation       Phone   Ruby Christine                 Spouse                 [phone]    Current Insurance on file: [employer] (Managed Care)    DME Needed: No    Action(s) Taken: DC Assessment Complete (See below)    LSW met with pt at bedside to complete DC assessment. LSW Introduced self and department roles. Pt A&Ox4 and able to verify the information on the face sheet. Pt lives with his wife in a single-story house. Pt verified address as [facility] [address], [place] [address]. Pt stated his PCP is Lucho Davidson DO. Prior to this hospitalization pt was independent at home with ADLs and most IADLs. Pt does not use any DME at baseline. Pt reported that his wife is a good support. Pt denies any MH concerns. Pt stated that he drinks 2-3 alcoholic drinks every night and uses marijuana every night. Pt stated that the last time he used alcohol and marijuana was 3/6, the night before he was admitted to the hospital. Pt does not have an advance directive. Pt family will transport pt home.     Escalations Completed: None    Medically Clear: No    Next Steps: f/u with pt and medical team to discuss dc needs and barriers.     Barriers to Discharge: Medical clearance and Pending PT Evaluation    Is the patient up for discharge tomorrow: No      Care Transition Team Assessment    Information Source  Orientation Level: Oriented X4  Information Given By: Patient  Who is responsible for making decisions for patient? : Patient    Readmission Evaluation  Is this a readmission?: No    Elopement Risk  Legal Hold: No  Ambulatory or Self  Mobile in Wheelchair: Yes  Disoriented: No  Psychiatric Symptoms: None  History of Wandering: No  Elopement this Admit: No  Vocalizing Wanting to Leave: No  Displays Behaviors, Body Language Wanting to Leave: No-Not at Risk for Elopement  Elopement Risk: Not at Risk for Elopement    Interdisciplinary Discharge Planning  Lives with - Patient's Self Care Capacity: Spouse (Ruby, home and can help as needed)  Patient or legal guardian wants to designate a caregiver: No  Support Systems: Family Member(s), Spouse / Significant Other  Housing / Facility: [address] [place] [address]  Prior Services: None  Durable Medical Equipment: Not Applicable    Discharge Preparedness  What is your plan after discharge?: Home with help  What are your discharge supports?: Spouse  Prior Functional Level: Ambulatory, Independent with Activities of Daily Living, Independent with Medication Management    Functional Assesment  Prior Functional Level: Ambulatory, Independent with Activities of Daily Living, Independent with Medication Management    Finances  Financial Barriers to Discharge: No  Prescription Coverage: Yes    Vision / Hearing Impairment  Vision Impairment : Yes  Right Eye Vision: Impaired, Wears Glasses  Left Eye Vision: Impaired, Wears Glasses  Hearing Impairment : Yes  Hearing Impairment: Both Ears  Does Pt Need Special Equipment for the Hearing Impaired?: No    Advance Directive  Advance Directive?: None    Domestic Abuse  Have you ever been the victim of abuse or violence?: No  Physical Abuse or Sexual Abuse: No  Verbal Abuse or Emotional Abuse: No  Possible Abuse/Neglect Reported to:: Not Applicable    Psychological Assessment  History of Substance Abuse: Alcohol, Marijuana  Date Last Used - Alcohol: 3/6/2024  Date Last Used - Marijuana: 3/6/2024  History of Psychiatric Problems: No    Anticipated Discharge Information  Discharge Disposition: Discharged to home/self care (01)  Discharge Address: [facility] [address]  [id number]

## 2024-03-13 NOTE — PROGRESS NOTES
Monitor summary:    Sinus rhythm then conversion to Afib @ 0202.   - /.08/ -    Pacer wires in place, no pacing requirements. Back up settings: VVI 50/5/2

## 2024-03-14 LAB
ANION GAP SERPL CALC-SCNC: 12 MMOL/L (ref 7–16)
BUN SERPL-MCNC: 18 MG/DL (ref 8–22)
CALCIUM SERPL-MCNC: 8.3 MG/DL (ref 8.5–10.5)
CHLORIDE SERPL-SCNC: 97 MMOL/L (ref 96–112)
CO2 SERPL-SCNC: 23 MMOL/L (ref 20–33)
CREAT SERPL-MCNC: 0.74 MG/DL (ref 0.5–1.4)
ERYTHROCYTE [DISTWIDTH] IN BLOOD BY AUTOMATED COUNT: 51.8 FL (ref 35.9–50)
GFR SERPLBLD CREATININE-BSD FMLA CKD-EPI: 102 ML/MIN/1.73 M 2
GLUCOSE BLD STRIP.AUTO-MCNC: 121 MG/DL (ref 65–99)
GLUCOSE BLD STRIP.AUTO-MCNC: 124 MG/DL (ref 65–99)
GLUCOSE BLD STRIP.AUTO-MCNC: 130 MG/DL (ref 65–99)
GLUCOSE BLD STRIP.AUTO-MCNC: 91 MG/DL (ref 65–99)
GLUCOSE SERPL-MCNC: 116 MG/DL (ref 65–99)
HCT VFR BLD AUTO: 31.7 % (ref 42–52)
HGB BLD-MCNC: 10.9 G/DL (ref 14–18)
MAGNESIUM SERPL-MCNC: 2.2 MG/DL (ref 1.5–2.5)
MCH RBC QN AUTO: 35.2 PG (ref 27–33)
MCHC RBC AUTO-ENTMCNC: 34.4 G/DL (ref 32.3–36.5)
MCV RBC AUTO: 102.3 FL (ref 81.4–97.8)
PHOSPHATE SERPL-MCNC: 2.5 MG/DL (ref 2.5–4.5)
PLATELET # BLD AUTO: 172 K/UL (ref 164–446)
PMV BLD AUTO: 11.5 FL (ref 9–12.9)
POTASSIUM SERPL-SCNC: 3.8 MMOL/L (ref 3.6–5.5)
RBC # BLD AUTO: 3.1 M/UL (ref 4.7–6.1)
SODIUM SERPL-SCNC: 132 MMOL/L (ref 135–145)
WBC # BLD AUTO: 14.8 K/UL (ref 4.8–10.8)

## 2024-03-14 PROCEDURE — A9270 NON-COVERED ITEM OR SERVICE: HCPCS | Performed by: NURSE PRACTITIONER

## 2024-03-14 PROCEDURE — 97535 SELF CARE MNGMENT TRAINING: CPT

## 2024-03-14 PROCEDURE — 84100 ASSAY OF PHOSPHORUS: CPT

## 2024-03-14 PROCEDURE — 94669 MECHANICAL CHEST WALL OSCILL: CPT

## 2024-03-14 PROCEDURE — A9270 NON-COVERED ITEM OR SERVICE: HCPCS | Mod: JZ

## 2024-03-14 PROCEDURE — 97530 THERAPEUTIC ACTIVITIES: CPT

## 2024-03-14 PROCEDURE — 82962 GLUCOSE BLOOD TEST: CPT

## 2024-03-14 PROCEDURE — 80048 BASIC METABOLIC PNL TOTAL CA: CPT

## 2024-03-14 PROCEDURE — 99233 SBSQ HOSP IP/OBS HIGH 50: CPT | Performed by: INTERNAL MEDICINE

## 2024-03-14 PROCEDURE — 700102 HCHG RX REV CODE 250 W/ 637 OVERRIDE(OP): Performed by: NURSE PRACTITIONER

## 2024-03-14 PROCEDURE — 97167 OT EVAL HIGH COMPLEX 60 MIN: CPT

## 2024-03-14 PROCEDURE — 700111 HCHG RX REV CODE 636 W/ 250 OVERRIDE (IP)

## 2024-03-14 PROCEDURE — 51798 US URINE CAPACITY MEASURE: CPT

## 2024-03-14 PROCEDURE — 700111 HCHG RX REV CODE 636 W/ 250 OVERRIDE (IP): Mod: JZ

## 2024-03-14 PROCEDURE — 770020 HCHG ROOM/CARE - TELE (206)

## 2024-03-14 PROCEDURE — 83735 ASSAY OF MAGNESIUM: CPT

## 2024-03-14 PROCEDURE — 85027 COMPLETE CBC AUTOMATED: CPT

## 2024-03-14 PROCEDURE — 700111 HCHG RX REV CODE 636 W/ 250 OVERRIDE (IP): Mod: JZ | Performed by: NURSE PRACTITIONER

## 2024-03-14 PROCEDURE — 700102 HCHG RX REV CODE 250 W/ 637 OVERRIDE(OP): Mod: JZ

## 2024-03-14 RX ADMIN — AMIODARONE HYDROCHLORIDE 400 MG: 200 TABLET ORAL at 02:57

## 2024-03-14 RX ADMIN — ACETAMINOPHEN 1000 MG: 500 TABLET, FILM COATED ORAL at 05:46

## 2024-03-14 RX ADMIN — ACETAMINOPHEN 1000 MG: 500 TABLET, FILM COATED ORAL at 12:17

## 2024-03-14 RX ADMIN — AMIODARONE HYDROCHLORIDE 0.5 MG/MIN: 1.8 INJECTION, SOLUTION INTRAVENOUS at 08:56

## 2024-03-14 RX ADMIN — ACETAMINOPHEN 1000 MG: 500 TABLET, FILM COATED ORAL at 17:53

## 2024-03-14 RX ADMIN — LEVOTHYROXINE SODIUM 125 MCG: 0.12 TABLET ORAL at 05:46

## 2024-03-14 RX ADMIN — POLYETHYLENE GLYCOL 3350 1 PACKET: 17 POWDER, FOR SOLUTION ORAL at 16:53

## 2024-03-14 RX ADMIN — ASPIRIN 81 MG: 81 TABLET, COATED ORAL at 05:46

## 2024-03-14 RX ADMIN — ATORVASTATIN CALCIUM 40 MG: 40 TABLET, FILM COATED ORAL at 20:40

## 2024-03-14 RX ADMIN — METOPROLOL TARTRATE 25 MG: 25 TABLET, FILM COATED ORAL at 05:46

## 2024-03-14 RX ADMIN — OXYCODONE 5 MG: 5 TABLET ORAL at 02:57

## 2024-03-14 RX ADMIN — OXYCODONE 5 MG: 5 TABLET ORAL at 06:24

## 2024-03-14 RX ADMIN — ENOXAPARIN SODIUM 40 MG: 100 INJECTION SUBCUTANEOUS at 16:54

## 2024-03-14 RX ADMIN — FUROSEMIDE 20 MG: 10 INJECTION INTRAMUSCULAR; INTRAVENOUS at 16:53

## 2024-03-14 RX ADMIN — POTASSIUM CHLORIDE 10 MEQ: 1500 TABLET, EXTENDED RELEASE ORAL at 05:45

## 2024-03-14 RX ADMIN — FUROSEMIDE 20 MG: 10 INJECTION INTRAMUSCULAR; INTRAVENOUS at 05:46

## 2024-03-14 RX ADMIN — Medication 1 APPLICATOR: at 09:26

## 2024-03-14 RX ADMIN — METOPROLOL TARTRATE 25 MG: 25 TABLET, FILM COATED ORAL at 16:53

## 2024-03-14 RX ADMIN — OMEPRAZOLE 20 MG: 20 CAPSULE, DELAYED RELEASE ORAL at 05:46

## 2024-03-14 RX ADMIN — AMIODARONE HYDROCHLORIDE 400 MG: 200 TABLET ORAL at 16:53

## 2024-03-14 RX ADMIN — POTASSIUM CHLORIDE 10 MEQ: 1500 TABLET, EXTENDED RELEASE ORAL at 17:53

## 2024-03-14 RX ADMIN — DOCUSATE SODIUM 100 MG: 100 CAPSULE, LIQUID FILLED ORAL at 16:53

## 2024-03-14 RX ADMIN — DOCUSATE SODIUM 100 MG: 100 CAPSULE, LIQUID FILLED ORAL at 05:45

## 2024-03-14 RX ADMIN — Medication 1 APPLICATOR: at 20:40

## 2024-03-14 RX ADMIN — CLOPIDOGREL BISULFATE 75 MG: 75 TABLET ORAL at 05:46

## 2024-03-14 ASSESSMENT — COGNITIVE AND FUNCTIONAL STATUS - GENERAL
SUGGESTED CMS G CODE MODIFIER MOBILITY: CK
TOILETING: A LITTLE
CLIMB 3 TO 5 STEPS WITH RAILING: A LITTLE
DAILY ACTIVITIY SCORE: 24
SUGGESTED CMS G CODE MODIFIER MOBILITY: CJ
DRESSING REGULAR LOWER BODY CLOTHING: A LITTLE
MOBILITY SCORE: 19
SUGGESTED CMS G CODE MODIFIER DAILY ACTIVITY: CH
MOVING TO AND FROM BED TO CHAIR: A LITTLE
HELP NEEDED FOR BATHING: A LITTLE
MOVING FROM LYING ON BACK TO SITTING ON SIDE OF FLAT BED: A LITTLE
DRESSING REGULAR UPPER BODY CLOTHING: A LITTLE
CLIMB 3 TO 5 STEPS WITH RAILING: A LITTLE
TURNING FROM BACK TO SIDE WHILE IN FLAT BAD: A LITTLE
MOVING FROM LYING ON BACK TO SITTING ON SIDE OF FLAT BED: A LITTLE
MOBILITY SCORE: 21
SUGGESTED CMS G CODE MODIFIER DAILY ACTIVITY: CJ
DAILY ACTIVITIY SCORE: 20
TURNING FROM BACK TO SIDE WHILE IN FLAT BAD: A LITTLE
WALKING IN HOSPITAL ROOM: A LITTLE

## 2024-03-14 ASSESSMENT — PAIN DESCRIPTION - PAIN TYPE
TYPE: ACUTE PAIN

## 2024-03-14 ASSESSMENT — ACTIVITIES OF DAILY LIVING (ADL): TOILETING: INDEPENDENT

## 2024-03-14 ASSESSMENT — ENCOUNTER SYMPTOMS
HEMOPTYSIS: 0
BACK PAIN: 1
BLOOD IN STOOL: 0
SHORTNESS OF BREATH: 0
ORTHOPNEA: 0
VOMITING: 0
NAUSEA: 0
FOCAL WEAKNESS: 0
PALPITATIONS: 0
SORE THROAT: 0
HEADACHES: 0
FEVER: 0
BRUISES/BLEEDS EASILY: 0
ABDOMINAL PAIN: 0
CHILLS: 0
EYES NEGATIVE: 1
SPUTUM PRODUCTION: 0
NERVOUS/ANXIOUS: 0
COUGH: 0

## 2024-03-14 ASSESSMENT — FIBROSIS 4 INDEX
FIB4 SCORE: 1.97
FIB4 SCORE: 1.97

## 2024-03-14 ASSESSMENT — GAIT ASSESSMENTS
GAIT LEVEL OF ASSIST: STANDBY ASSIST
ASSISTIVE DEVICE: FRONT WHEEL WALKER
DISTANCE (FEET): 200

## 2024-03-14 NOTE — DISCHARGE PLANNING
"Discharge Discussion and home care recap prior to discharge:      Discharge review was completed with patient and family.    Patient was reminded that outpatient cardiac rehab beginning 6 weeks post op. They were told it is highly recommended and available to them if desired, but not required. They were told to look at the provided flyer for the contact number and additional information.    Patient was reminded to utilize the vitals log book provided to take his/her weight daily and record.    Patient was told to call me with weight gain > 3 lbs in 1 day or 5 lbs in 1 week  Patient was also told to record heart rate and blood pressure morning and night; and to follow the hold parameters provided in the discharge summary on beta blockers and ACE/ARB (if prescribed on discharge).    Patient was reminded to review the \"recovery after heart surgery\" packet with information on normal expectations such as clicking in the chest, loud/pounding heart/ hot and cold flashes, weight loss, constipation, insomnia, tingling on left side of chest (CABG with LIMA).  I also reviewed the decision tree of whom to call and when with concerns after going home. RN navigator Monday through Friday during business hours for any questions or urgent concerns, and the office for urgent concerns at night or on the weekends.    I briefly recapped the discharge instructions that their primary RN will review in depth prior to discharge   1) appointments   2) medication reconciliation (start, continue, change, stop)   3) care instructions    All additional questions were answered or deferred to the bedside RN.    Event time 45 minutes    "

## 2024-03-14 NOTE — PROGRESS NOTES
4 Eyes Skin Assessment Completed by SANTOSH Alcantara and Marielena RN.    Head WDL  Ears Redness and Blanching  Nose WDL  Mouth WDL  Neck WDL  Breast/Chest Bruising and Incision [place]  Shoulder Blades WDL  Spine WDL  (R) Arm/Elbow/Hand Bruising and Scab  (L) Arm/Elbow/Hand Bruising and Scab  Abdomen Chest tube Incisions  L Groin Bruising  Scrotum/Coccyx/Buttocks WDL  (R) Leg Bruising, Edema, and Incision x1  (L) Leg Bruising, Edema, and Incision x3  (R) Heel/Foot/Toe Edema  (L) Heel/Foot/Toe Edema          Devices In Places Tele Box, Blood Pressure Cuff, Pulse Ox, Central Line, and Nasal Cannula      Interventions In Place Gray Ear Foams, NC W/Ear Foams, Chair Waffle, Pillows, and Low Air Loss Mattress    Possible Skin Injury No    Pictures Uploaded Into Epic N/A  Wound Consult Placed N/A  RN Wound Prevention Protocol Ordered No

## 2024-03-14 NOTE — DISCHARGE PLANNING
Discharge Appointments/outpatient referrals/ and  set up:    Cardiac surgery follow up appointment made for 4-5 weeks out    Hospital discharge team/schedulers called for cardiology f/u appointment for MD or APRN within 3-4 weeks if possible.    Aortic surveillance program/vascular medicine referral not needed, orders not placed.    Anticoagulation referral/ coumadin clinic referral not needed and orders not placed .    INR draws are not indicated for CABG procedure and Plavix.

## 2024-03-14 NOTE — CARE PLAN
The patient is Watcher - Medium risk of patient condition declining or worsening    Shift Goals  Clinical Goals: Ambulation/Transfer  Patient Goals: Rest  Family Goals: Updates    Progress made toward(s) clinical / shift goals:    Problem: Knowledge Deficit - Standard  Goal: Patient and family/care givers will demonstrate understanding of plan of care, disease process/condition, diagnostic tests and medications  Description: Target End Date:  1-3 days or as soon as patient condition allows    Document in Patient Education    1.  Patient and family/caregiver oriented to unit, equipment, visitation policy and means for communicating concern  2.  Complete/review Learning Assessment  3.  Assess knowledge level of disease process/condition, treatment plan, diagnostic tests and medications  4.  Explain disease process/condition, treatment plan, diagnostic tests and medications  Outcome: Progressing     Problem: Pain - Standard  Goal: Alleviation of pain or a reduction in pain to the patient’s comfort goal  Description: Target End Date:  Prior to discharge or change in level of care    Document on Vitals flowsheet    1.  Document pain using the appropriate pain scale per order or unit policy  2.  Educate and implement non-pharmacologic comfort measures (i.e. relaxation, distraction, massage, cold/heat therapy, etc.)  3.  Pain management medications as ordered  4.  Reassess pain after pain med administration per policy  5.  If opiods administered assess patient's response to pain medication is appropriate per POSS sedation scale  6.  Follow pain management plan developed in collaboration with patient and interdisciplinary team (including palliative care or pain specialists if applicable)  Outcome: Progressing       Patient is not progressing towards the following goals:

## 2024-03-14 NOTE — CARE PLAN
The patient is Watcher - Medium risk of patient condition declining or worsening    Shift Goals  Clinical Goals: wean oxygen, hemodynamic stability, mobilize x2  Patient Goals: remove rice  Family Goals: get will and go home    Progress made toward(s) clinical / shift goals:    Problem: Knowledge Deficit - Standard  Goal: Patient and family/care givers will demonstrate understanding of plan of care, disease process/condition, diagnostic tests and medications  Outcome: Progressing     Problem: Pain - Standard  Goal: Alleviation of pain or a reduction in pain to the patient’s comfort goal  Outcome: Progressing     Problem: Post op day 2 CABG/Heart Valve Replacement  Goal: Optimal care of the post op CABG/heart valve replacement post op day 2  Outcome: Met  Intervention: FSBS: when 2 consecutive BS < 130 after post op day 2, discontinue FSBS unless patient is insulin dependent diabetic  Note: Hx DM 2  Intervention: Daily weights in the morning  Note: Completed by NOC shift  Intervention: Up in chair for all meals  Note: Pt up to chair for entire shift  Intervention: Ambulate 4 times daily, increasing the distance each time  Note: Pt mobilized x2 by dayshift  Intervention: Stand at sink and wash up with assistance.  Clean incisions twice daily with soap and water.  Note: Incision care completed on midline incision, chest tube incision, and EVH sites.   Intervention: IS q 1 hour while awake and record best IS volume  Note: Best   Intervention: Consider pacer wire removal by MD  Note: Pacer wires left in place by CTS  Intervention: Consider removal of riec and chest tube if not already done  Note: Rice catheter removed per order.        Patient is not progressing towards the following goals:

## 2024-03-14 NOTE — PROGRESS NOTES
Critical Care Progress Note    Date of admission  3/7/2024    Chief Complaint  63 y.o. male w/PMHx HTN, DLD, DM who presented 3/7/2024 with chest pain which was relieved with aspirin heparin.  Troponin was elevated and angiogram revealed multivessel disease.  Patient seen by CVS and is taken electively to the operating room for three-vessel CABG which was performed today.  No complications intraoperatively.  V wires functional but did not require pacing.  Mild hypotension on low-dose norepinephrine infusion.  Blood sugar a bit labile currently on 1 unit of insulin.  Sedated with dexmedetomidine at 0.5 but still somnolent and not moving because he was not reversed.  Initial blood gas post op 7.42/37/247.  CXR not released yet but anesthesia did see the film and felt tubes and lines were in good position without significant edema, atelectasis or pneumothorax.  Case reviewed at length at the bedside with cardiac surgery and cardiac anesthesia.   ([facility] HPI 3/11)    Hospital Course  No notes on file    Interval Problem Update  Reviewed last 24 hour events:    A&O x4  Follows well, feels better  Back pain mostly resolved  No palpitations  SR 60-70s  SBP 90-130s  Amiodarone infusion  Metoprolol  Lasix 20 mg IV every 12  WOB low  Nasal cannula weaned off to room air, denies shortness of breath  O2 sats 90-94%  Tmax 100  WBC 14.8  ID ROS negative  Hemoglobin 10.9,   , K 3.8, Mg 2.2  ASA, clopidogrel, atorvastatin  Levothyroxine  Lovenox and PPI PPx      Transition to p.o. amiodarone  Potassium supplementation  Mobilize/I-S  Okay to transfer out of ICU to telemetry         YESTERDAY  Case reviewed with CVS at the bedside    A&O 4  Concern of right-sided low back paraspinous muscle pain and knot which RN applied a heat pack to and that seems to be helping, requesting IcyHot  Sternal CP better denies palpitations  AF RVR last night - Amiodarone bolus and drip started  Work of breathing low  SR 80-90s  SBP  90-100s, no pressors-weaned off clevidipine  UO adequate, fluid balance neutral  Furosemide 20 mg IV every 12  WOB low, O2 weaned to 1 L nasal cannula, O2 sats 90-93%  Tmax 100, WBC 16  Hemoglobin 11,   Electrolytes grossly normal, sodium 134  Glucose well-controlled 139-179  MAR reviewed: Amiodarone, ASA/clopidogrel, atorvastatin, Lovenox, levothyroxine, metoprolol, omeprazole    IV amiodarone infusion, rebolus as needed  Optimize electrolytes, serial magnesium and potassium in particular  PT for back trouble as needed  Mobilize as tolerated  Heat/stretching  Topical therapy as clinically appropriate-patient requesting IcyHot  TSH 0.29 mildly suppressed will repeat TSH and check free T3 and free T4 levels.    Review of Systems  Review of Systems   Constitutional:  Negative for chills, fever and malaise/fatigue.   HENT:  Negative for congestion and sore throat.    Eyes: Negative.    Respiratory:  Negative for cough, hemoptysis, sputum production and shortness of breath.    Cardiovascular:  Positive for chest pain (Incisional-improved). Negative for palpitations, orthopnea and leg swelling (Improved).   Gastrointestinal:  Negative for abdominal pain, blood in stool, nausea and vomiting.   Genitourinary:  Negative for dysuria and hematuria.   Musculoskeletal:  Positive for back pain (Right-sided mid/upper lumbar thoracic, mild).   Neurological:  Negative for focal weakness and headaches.   Endo/Heme/Allergies:  Does not bruise/bleed easily.   Psychiatric/Behavioral:  The patient is not nervous/anxious.         Vital Signs for last 24 hours   Temp:  [36 °C (96.8 °F)-37.7 °C (99.9 °F)] 36.5 °C (97.7 °F)  Pulse:  [] 73  Resp:  [16-20] 18  BP: ()/(51-76) 116/66  SpO2:  [89 %-96 %] 92 %    Hemodynamic parameters for last 24 hours       Respiratory Information for the last 24 hours       Physical Exam   Physical Exam  Vitals reviewed.   Constitutional:       Appearance: He is not ill-appearing.   HENT:       Head: Normocephalic and atraumatic.      Mouth/Throat:      Mouth: Mucous membranes are moist.   Eyes:      General: No scleral icterus.     Extraocular Movements: Extraocular movements intact.      Pupils: Pupils are equal, round, and reactive to light.   Neck:      Vascular: No JVD.   Cardiovascular:      Rate and Rhythm: Normal rate and regular rhythm.      Pulses: Normal pulses.      Heart sounds: No murmur heard.     No gallop.      Comments: Sinus rhythm  Pulmonary:      Effort: No respiratory distress.      Breath sounds: No wheezing, rhonchi or rales.   Abdominal:      General: Bowel sounds are normal.      Palpations: Abdomen is soft.      Tenderness: There is no abdominal tenderness. There is no right CVA tenderness, left CVA tenderness or guarding.   Musculoskeletal:      Cervical back: Neck supple.      Lumbar back: Spasms (Right lower lumbar) and tenderness (Right lower lumbar paraspinous) present.      Right lower leg: No edema.      Comments: R sided paraspinous pain and spasm improved   Lymphadenopathy:      Cervical: No cervical adenopathy.   Skin:     General: Skin is warm and dry.      Capillary Refill: Capillary refill takes less than 2 seconds.   Neurological:      General: No focal deficit present.      Mental Status: He is alert and oriented to person, place, and time. Mental status is at baseline.   Psychiatric:         Mood and Affect: Mood normal.         Behavior: Behavior normal.         Thought Content: Thought content normal.         Medications  Current Facility-Administered Medications   Medication Dose Route Frequency Provider Last Rate Last Admin    amiodarone (Nexterone) 360 mg/200 mL infusion  0.5 mg/min Intravenous Continuous VINAY McmahanCPat 16.7 mL/hr at 03/14/24 0856 0.5 mg/min at 03/14/24 0856    amiodarone (Cordarone) tablet 400 mg  400 mg Oral BID BREANNA Rangel.R.N.   400 mg at 03/14/24 0257    Icy Hot Balm Extra Strength 7.6-29 % topical ointment   Topical PRN  Jose Billy M.D.   Given at 03/13/24 0837    cyclobenzaprine (Flexeril) tablet 10 mg  10 mg Oral TID PRN Monty Gallego A.P.R.N.   10 mg at 03/13/24 1214    furosemide (Lasix) injection 20 mg  20 mg Intravenous BID DIURETIC LEEANNA McmahanAPat-C.   20 mg at 03/14/24 0546    potassium chloride SA (Kdur) tablet 10 mEq  10 mEq Oral BID ELSA Mcmahan.A.-C.   10 mEq at 03/14/24 0545    insulin regular (HumuLIN R,NovoLIN R) injection  2-9 Units Subcutaneous 4X/DAY ACHS CHASE Cantu.OPat        And    dextrose 50% (D50W) injection 25 g  25 g Intravenous Q15 MIN PRN Salas Chapman D.O.        Respiratory Therapy Consult   Nebulization Continuous RT Monty Gallego A.P.R.N.        electrolyte-A (Plasmalyte-A) infusion   Intravenous PRN Monty Gallego A.P.R.N. 999 mL/hr at 03/11/24 1558 New Bag at 03/11/24 1558    Nozin nasal  swab  1 Applicator Each Nostril BID Monty Gallego A.P.R.N.   1 Applicator at 03/14/24 0926    metoprolol tartrate (Lopressor) tablet 25 mg  25 mg Oral BID Monty Gallego, A.P.R.N.   25 mg at 03/14/24 0546    aspirin EC tablet 81 mg  81 mg Oral DAILY Monty Gallego, A.P.R.N.   81 mg at 03/14/24 0546    clopidogrel (Plavix) tablet 75 mg  75 mg Oral DAILY Monty Gallego, A.P.R.N.   75 mg at 03/14/24 0546    atorvastatin (Lipitor) tablet 40 mg  40 mg Oral QHS Monty Gallego, A.P.R.N.   40 mg at 03/13/24 2032    acetaminophen (Tylenol) tablet 1,000 mg  1,000 mg Oral Q6HRS Monty Gallego, A.P.R.N.   1,000 mg at 03/14/24 1217    Followed by    [START ON 3/21/2024] acetaminophen (Tylenol) tablet 1,000 mg  1,000 mg Oral Q6HRS PRN Monty Gallego, A.P.R.N.        oxyCODONE immediate-release (Roxicodone) tablet 5 mg  5 mg Oral Q3HRS PRN Monty Gallego, A.P.R.N.   5 mg at 03/14/24 0624    Or    oxyCODONE immediate release (Roxicodone) tablet 10 mg  10 mg Oral Q3HRS PRN Monty Gallego, A.P.R.N.        Or    fentaNYL (Sublimaze) injection 50 mcg  50 mcg Intravenous Q3HRS PRN Monty Gallego,  A.P.R.N.        traMADol (Ultram) 50 MG tablet 50 mg  50 mg Oral Q4HRS PRN Monty Gallego, A.P.R.N.        ondansetron (Zofran) syringe/vial injection 8 mg  8 mg Intravenous Q6HRS PRN Monty Gallego, A.P.R.N.        Or    prochlorperazine (Compazine) injection 10 mg  10 mg Intravenous Q6HRS PRN Monty Gallego, A.P.R.N.        acetaminophen (Tylenol) tablet 650 mg  650 mg Oral Q4HRS PRN Monty Gallego, A.P.R.N.        Or    acetaminophen (Tylenol) suppository 650 mg  650 mg Rectal Q4HRS PRN Monty Gallego, A.P.R.N.        docusate sodium (Colace) capsule 100 mg  100 mg Oral BID Monty Gallego, A.P.R.N.   100 mg at 03/14/24 0545    senna-docusate (Pericolace Or Senokot S) 8.6-50 MG per tablet 1 Tablet  1 Tablet Oral Nightly Monty Gallego, A.P.R.N.   1 Tablet at 03/13/24 2032    senna-docusate (Pericolace Or Senokot S) 8.6-50 MG per tablet 1 Tablet  1 Tablet Oral Q24HRS PRN Monty Gallego, A.P.R.N.        polyethylene glycol/lytes (Miralax) Packet 1 Packet  1 Packet Oral BID PRN Monty Gallego, A.P.R.N.        magnesium hydroxide (Milk Of Magnesia) suspension 30 mL  30 mL Oral QDAY PRN Monty Gallego, A.P.R.N.        bisacodyl (Dulcolax) suppository 10 mg  10 mg Rectal Q24HRS PRN Monty Gallego, A.P.R.N.        sodium phosphate (Fleet) enema 133 mL  1 Each Rectal Once PRN Monty Gallego, A.P.R.N.        omeprazole (PriLOSEC) capsule 20 mg  20 mg Oral DAILY Monty Gallego, A.P.R.N.   20 mg at 03/14/24 0546    mag hydrox-al hydrox-simeth (Maalox Plus Es Or Mylanta Ds) suspension 30 mL  30 mL Oral Q4HRS PRN Monty Gallego, A.P.R.N.        diphenhydrAMINE (Benadryl) tablet/capsule 25 mg  25 mg Oral HS PRN - MR X 1 Monty Gallego, A.P.R.N.   25 mg at 03/12/24 0012    enoxaparin (Lovenox) inj 40 mg  40 mg Subcutaneous DAILY AT 1800 Monty Gallego, A.P.R.N.   40 mg at 03/13/24 1814    levothyroxine (Synthroid) tablet 125 mcg  125 mcg Oral QAM Monty Gallego, A.P.R.N.   125 mcg at 03/14/24 0546       Fluids    Intake/Output Summary (Last 24  hours) at 3/14/2024 1244  Last data filed at 3/14/2024 1158  Gross per 24 hour   Intake 1949.8 ml   Output 1525 ml   Net 424.8 ml       Laboratory  Recent Labs     03/11/24  1345 03/11/24  1539 03/11/24  1737   ISTATAPH 7.519* 7.469 7.514*   ISTATAPCO2 25.8* 29.2 25.0*   ISTATAPO2 94* 135* 83   ISTATATCO2 22 22 21   LDRWXTJ1ELT 98 99 97   ISTATARTHCO3 21.0 21.2 20.1   ISTATARTBE 0 -1 -1   ISTATTEMP 36.6 C 37.0 C 37.2 C   ISTATFIO2 70 50 30   ISTATSPEC Arterial Arterial Arterial   ISTATAPHTC 7.525* 7.469 7.511*   INGAVJBQ3IE 92* 135* 84         Recent Labs     03/12/24  0007 03/12/24  0410 03/13/24  0010 03/14/24  0300   SODIUM 140  --  134* 132*   POTASSIUM 4.2 4.2 4.4 3.8   CHLORIDE 107  --  102 97   CO2 18*  --  23 23   BUN 10  --  19 18   CREATININE 0.80  --  0.91 0.74   MAGNESIUM  --   --  2.3 2.2   PHOSPHORUS  --   --   --  2.5   CALCIUM 7.7*  --  8.3* 8.3*     Recent Labs     03/12/24  0007 03/13/24  0010 03/14/24  0300   GLUCOSE 154* 136* 116*     Recent Labs     03/12/24  0007 03/13/24  0010 03/14/24  0300   WBC 14.0* 16.0* 14.8*     Recent Labs     03/12/24  0007 03/13/24  0010 03/14/24  0300   RBC 3.51* 3.17* 3.10*   HEMOGLOBIN 12.2* 11.0* 10.9*   HEMATOCRIT 36.0* 32.5* 31.7*   PLATELETCT 153* 145* 172       Imaging  X-Ray:  I have personally reviewed the images and compared with prior images.  EKG:  I have personally reviewed the images and compared with prior images.  Echo:   Reviewed    Assessment/Plan  * Encounter for weaning from ventilator (HCC)  Assessment & Plan  Intubated electively with grade 1 airway 3/11  Extubated 3/11  Mild hypoxemia on low-flow nasal cannula O2  RT/O2 protocols  Mobilize as dysrhythmias allow-encouraged  Incentive spirometry encouraged 1500 cc finally  Forced diuresis as clinically prudent  Weaned to room air, WOB low, sats normal    Atrial fibrillation with RVR ([facility])  Assessment & Plan  Postop A-fib with RVR, back in sinus rhythm  Status post amiodarone bolus and  infusion  Rebolus as clinically appropriate  Continue oral therapy 400 mg every 12, transition to maintenance dose when load complete  Metoprolol 25 twice daily  Optimize electrolytes, ideally K greater than 4 and mag greater than 2  Function studies okay  Echocardiogram noted  Monitor need for anticoagulation defer to timing postoperatively in this cardiac surgery patient to CVS team    S/P CABG x 3  Assessment & Plan  Elective three-vessel CABG 3/11 by Dr. Chapman  Actively titrating norepinephrine for hypotension  Transition to clevidipine as needed for hypertension  Goal BP   Monitoring CVP  V Wires removed  Mediastinal chest tubes removed  Anticoagulation/antiplatelets as per CVS postop  Dexmedetomidine infusion for sedation weaned off  Lovenox okayed by CVS  Forced diuresis as clinically appropriate-IV furosemide to optimize electrolytes  GDMT    ACS (acute coronary syndrome) (HCC)- (present on admission)  Assessment & Plan  Admitted with NSTEMI/ACS  Three-vessel disease by angiogram  CABG three-vessel 3/11  GDMT  Statin/DAPT-cardiac rehab after discharge  Reviewed risk factor modification    Type 2 diabetes mellitus, without long-term current use of insulin ([facility])- (present on admission)  Assessment & Plan  Continuous insulin infusion protocol x 24 hours post CABG  Transitioned to SSI/glargine as needed  Glucose levels controlled  Continue to hold metformin  Glycohemoglobin 6.1    Primary hypertension- (present on admission)  Assessment & Plan  Hold home regimen for now  Continue metoprolol   Titrating clevidipine as needed  Consider adding ACE inhibitor as hemodynamics allow    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  Reviewed prior to discharge from hospital    Hypothyroid- (present on admission)  Assessment & Plan  Resume home levothyroxine, clinically appears euthyroid  Recent TSH borderline depressed at 0.29, follow-up as an outpatient    Free T3 and T4 levels noted, follow-up with primary MD    Hyperlipidemia- (present on admission)  Assessment & Plan  Continue atorvastatin     History of Hodgkin's lymphoma- (present on admission)  Assessment & Plan  History of         VTE:  Lovenox  Ulcer: PPI  Lines: Central Line  Ongoing indication addressed and Baca Catheter  Ongoing indication addressed    I have performed a physical exam and reviewed and updated ROS and Plan today (3/14/2024). In review of yesterday's note (3/13/2024), there are no changes except as documented above.     Discussed patient condition and risk of morbidity and/or mortality with Family, RN, RT, Pharmacy, Charge nurse / hot rounds, Patient, and CVS    Case reviewed with CVS, RCC will sign off, call if we can be of assistance

## 2024-03-14 NOTE — PROGRESS NOTES
Cardiovascular Surgery Progress Note    Name: Julian Christine  MRN: [id number]  : 1960  Admit Date: 3/7/2024 10:02 AM  3 Days Post-Op     Procedure:  Procedure(s) and Anesthesia Type:     * CABG X4, WITH ENDOSCOPIC VEIN PROCUREMENT - General     * ECHOCARDIOGRAM, TRANSESOPHAGEAL, INTRAOPERATIVE - General    Vitals:  Vitals:    24 0600 24 0624 24 0709 24 0713   BP: 139/72 139/72 94/51    Pulse: 67 66 66    Resp: 20 18 20 20   Temp:       TempSrc:       SpO2: 95% 94% 91% 91%   Weight: 79.5 kg (175 lb 4.3 oz)      Height:          Temp (24hrs), Av.4 °C (99.3 °F), Min:37 °C (98.6 °F), Max:37.7 °C (99.9 °F)      Respiratory:    Respiration: 20, Pulse Oximetry: 91 %       Fluids:    Intake/Output Summary (Last 24 hours) at 3/14/2024 0744  Last data filed at 3/14/2024 0600  Gross per 24 hour   Intake 1745.93 ml   Output 1775 ml   Net -29.07 ml     Admit weight: Weight: 77.1 kg (170 lb)  Current weight: Weight: 79.5 kg (175 lb 4.3 oz) (24 0600)    Labs:  Recent Labs     24  0007 24  0010 24  0300   WBC 14.0* 16.0* 14.8*   RBC 3.51* 3.17* 3.10*   HEMOGLOBIN 12.2* 11.0* 10.9*   HEMATOCRIT 36.0* 32.5* 31.7*   .6* 102.5* 102.3*   MCH 34.8* 34.7* 35.2*   MCHC 33.9 33.8 34.4   RDW 51.8* 52.5* 51.8*   PLATELETCT 153* 145* 172   MPV 11.8 12.0 11.5     Recent Labs     24  0007 24  0410 24  0010 24  0300   SODIUM 140  --  134* 132*   POTASSIUM 4.2 4.2 4.4 3.8   CHLORIDE 107  --  102 97   CO2 18*  --  23 23   GLUCOSE 154*  --  136* 116*   BUN 10  --  19 18   CREATININE 0.80  --  0.91 0.74   CALCIUM 7.7*  --  8.3* 8.3*     Recent Labs     24  1227   APTT 30.5   INR 1.28*       HgbA1c:  6.1     Diabetic Educator Consult:  no    Medications:  Scheduled Medications   Medication Dose Frequency    amiodarone  400 mg BID    furosemide  20 mg BID DIURETIC    potassium chloride SA  10 mEq BID    insulin regular  2-9 Units 4X/DAY ACHS    Nozin nasal   swab  1 Applicator BID    metoprolol tartrate  25 mg BID    aspirin  81 mg DAILY    clopidogrel  75 mg DAILY    atorvastatin  40 mg QHS    Pharmacy Consult Request  1 Each PHARMACY TO DOSE    acetaminophen  1,000 mg Q6HRS    docusate sodium  100 mg BID    senna-docusate  1 Tablet Nightly    omeprazole  20 mg DAILY    enoxaparin (LOVENOX) injection  40 mg DAILY AT 1800    levothyroxine  125 mcg QAM        Exam:   Physical Exam  Vitals and nursing note reviewed.   Constitutional:       General: He is not in acute distress.  HENT:      Head: Normocephalic.      Right Ear: External ear normal.      Left Ear: External ear normal.      Nose: Nose normal.      Mouth/Throat:      Mouth: Mucous membranes are moist.   Eyes:      Pupils: Pupils are equal, round, and reactive to light.   Cardiovascular:      Rate and Rhythm: Normal rate.      Pulses: Normal pulses.   Pulmonary:      Effort: Pulmonary effort is normal.   Abdominal:      General: Abdomen is flat. There is no distension.      Palpations: Abdomen is soft.   Musculoskeletal:         General: Normal range of motion.      Cervical back: Normal range of motion.      Right lower leg: No edema.      Left lower leg: No edema.   Skin:     General: Skin is warm and dry.      Comments: Sternotomy: open to air, CDI  SVG site: open to air, CDI   Neurological:      Mental Status: He is alert and oriented to person, place, and time.   Psychiatric:         Mood and Affect: Mood normal.         Behavior: Behavior normal.         Judgment: Judgment normal.         Cardiac Medications:    ASA - Yes    Plavix - Yes    Post-operative Beta Blockers - Yes    Ace/ARB- No; contraindicated because of Normal EF    Statin - Yes    Aldactone- No; contraindicated because of Normal EF    SGLT2i-  No contraindicated because of No; contraindicated because of Normal EF    Ejection Fraction:  70%    Telemetry:   3/12: SR  3/13: SR/aFib  3/14: SR    Assessment/Plan:  POD 1  HDS, extubated  on 2L NC, off gtts, neuro intact, wounds CDI, abdomen soft non tender, flatus +, BM -, fluid balance negative, wt up,  H/h:12/36, Cr:0.80.  Plan: remove mediastinal chest tubes, keep pacing wires. Remove rice once ambulatory, begin gentle diuresis. Encourage IS/ambulate.      POD 2  Hypotension, SR/converted to Afib overnight, remains in afib, on amio gtt, neuro intact, wounds CDI, abdomen soft non tender, +flatus, -BM, fluid balance negative, wt up from admit,  H/h: 11/32.5; Cr:0.91.  Plan: replete 1g mag,  25g albumin, remove rice, encourage IS/ambulate. Keep pacing wires. Keep CIC 1 more day.     POD 3  HDS, SR, Room air, neuro intact, wounds CDI, abdomen soft non tender, fluid balance negative, wt up,  H/h: 10.9/31.7; Cr: 0.74 with good UOP.  Plan:  Pacing wires removed, continue amio drip x 6 hours the transition to PO. Transfer to tele. Encourage IS/ambulate.    Disposition:  PT rec poss HH, continue to follow recs.

## 2024-03-14 NOTE — CARE PLAN
The patient is Stable - Low risk of patient condition declining or worsening    Shift Goals  Clinical Goals: SBP   Patient Goals: Rest  Family Goals: Comfort    Progress made toward(s) clinical / shift goals:    Problem: Knowledge Deficit - Standard  Goal: Patient and family/care givers will demonstrate understanding of plan of care, disease process/condition, diagnostic tests and medications  Outcome: Progressing     Problem: Pain - Standard  Goal: Alleviation of pain or a reduction in pain to the patient’s comfort goal  Outcome: Progressing  Flowsheets  Taken 3/14/2024 0000 by Dali Zabala RPatNPat  Pain Rating Scale (NPRS): 2  Taken 3/11/2024 1600 by Maricruz Renteria RLEANA  Critical-Care Pain Observation Score: 0     Problem: Post Op Day 1 CABG/Heart Valve Replacement  Goal: Optimal care of the post op CABG/heart valve replacement Post Op Day 1  Intervention: Daily weights in the morning  Note: Pt weighed with standing scale  Intervention: Up in chair for all meals  Note: Patient successfully up for all meals  Intervention: IS q 1 hour while awake and record best IS volume  Note: Pt able to pull 1250 on IS while awake     Problem: Post Op Day 3 CABG/Heart Valve replacement  Goal: Optimal care of the post op CABG/Heart Valve replacement post op day 3  Intervention: Ambulate 4 times daily, increasing the distance each time  Note: Successfully walked four times with minimal assistance.     Problem: Post op day 2 CABG/Heart Valve Replacement  Goal: Optimal care of the post op CABG/heart valve replacement post op day 2  Outcome: Progressing  Intervention: Daily weights in the morning  Note: Pt weighed with standing scale  Intervention: Up in chair for all meals  Note: Patient successfully up for all meals  Intervention: IS q 1 hour while awake and record best IS volume  Note: Pt able to pull 1250 on IS while awake  Intervention: Ambulate 4 times daily, increasing the distance each time  Note: Successfully  walked four times with minimal assistance.  Intervention: FSBS: when 2 consecutive BS < 130 after post op day 2, discontinue FSBS unless patient is insulin dependent diabetic  Note: Pt transitioned to subq insuline  Intervention: Stand at sink and wash up with assistance.  Clean incisions twice daily with soap and water.  Note: Pt able to assist with cleaning of incisions. Education provided regarding home care.   Intervention: Consider pacer wire removal by MD  Note: Per MD pacer wires still in place, but capped.  Intervention: Consider removal of rice and chest tube if not already done  Note: Rice and chest tubes removed. Pt yet to urinated on own. Bladder scan showed 150mL.      Problem: Skin Integrity  Goal: Skin integrity is maintained or improved  Outcome: Progressing     Problem: Fall Risk  Goal: Patient will remain free from falls  Outcome: Progressing

## 2024-03-14 NOTE — PROGRESS NOTES
Received report from CIC RN, assumed care of patient. Patient is A&Ox4, vital signs are stable, on room air. Monitors notified. Patient denies pain at this time, no signs of distress or discomfort. Sitting up in chair for lunch. Call light and belongings within reach. Bed in lowest/locked position. Chair alarm on.

## 2024-03-14 NOTE — THERAPY
Occupational Therapy   Initial Evaluation     Patient Name: Julian Christine  Age:  63 y.o., Sex:  male  Medical Record #: [id number]  Today's Date: 3/14/2024     Precautions  Precautions: Fall Risk, Cardiac Precautions (See Comments), Sternal Precautions (See Comments)  Comments: 4vCABG, EF 70%    Assessment    Patient is 63 y.o. male S/p CABG x4, pt normally independent with functional mobility and ADLs living in a [facility] with spouse who can assist as needed. Pt able to complete all functional mobility and ADLs with supervision. Pt educated on sternal precautions, adaptive strategies for full body dressing with patient and spouse, and adaptive equipment for home. Anticipate no further needs, will complete OT order at this time.      Plan      DC Equipment Recommendations: (P) Tub / Shower Seat  Discharge Recommendations: (P) Anticipate that the patient will have no further occupational therapy needs after discharge from the hospital     Objective       03/14/24 0940   Prior Living Situation   Prior Services None   Housing / Facility 1 Story House   Steps Into Home 2   Steps In Home 0   Bathroom Set up Walk In Shower   Equipment Owned None   Lives with - Patient's Self Care Capacity Spouse   Comments Spouse able to assist as needed   Prior Level of ADL Function   Self Feeding Independent   Grooming / Hygiene Independent   Bathing Independent   Dressing Independent   Toileting Independent   Prior Level of IADL Function   Medication Management Independent   Laundry Independent   Kitchen Mobility Independent   Finances Independent   Home Management Independent   Shopping Independent   Prior Level Of Mobility Independent Without Device in Community   Driving / Transportation Driving Independent   Precautions   Precautions Fall Risk;Cardiac Precautions (See Comments);Sternal Precautions (See Comments)   Cognition    Cognition / Consciousness WDL   Level of Consciousness Alert   Active ROM Upper Body   Active ROM Upper Body   WDL   Strength Upper Body   Upper Body Strength  WDL   Sensation Upper Body   Upper Extremity Sensation  WDL   Upper Body Muscle Tone   Upper Body Muscle Tone  WDL   Neurological Concerns   Neurological Concerns No   Coordination Upper Body   Coordination WDL   Balance Assessment   Sitting Balance (Static) Fair   Sitting Balance (Dynamic) Fair   Standing Balance (Static) Fair   Standing Balance (Dynamic) Fair   Weight Shift Sitting Good   Weight Shift Standing Good   Comments w/ FWW   Bed Mobility    Comments up in chair before, left with PT in hallway   ADL Assessment   Grooming Supervision;Standing   Upper Body Dressing Supervision   Lower Body Dressing Supervision   Toileting Supervision   How much help from another person does the patient currently need...   Putting on and taking off regular lower body clothing? 4   Bathing (including washing, rinsing, and drying)? 4   Toileting, which includes using a toilet, bedpan, or urinal? 4   Putting on and taking off regular upper body clothing? 4   Taking care of personal grooming such as brushing teeth? 4   Eating meals? 4   6 Clicks Daily Activity Score 24   Functional Mobility   Sit to Stand Supervised   Bed, Chair, Wheelchair Transfer Supervised   Toilet Transfers Supervised   Transfer Method Stand Step   Mobility STS from chair, bathroom mobility, up to sink, left with PT in hallway   Comments w/ FWW   Activity Tolerance   Sitting in Chair found seated in chair   Standing 15 min   Education Group   Education Provided Role of Occupational Therapist;Cardiac Precautions;Sternal Precautions   Role of Occupational Therapist Patient Response Patient;Acceptance;Explanation   Cardiac Precautions Patient Response Patient;Acceptance;Explanation;Handout   Sternal Precautions Patient Response Patient;Acceptance;Explanation   Problem List   Problem List None   Anticipated Discharge Equipment and Recommendations   DC Equipment Recommendations Tub / Shower Seat   Discharge  Recommendations Anticipate that the patient will have no further occupational therapy needs after discharge from the hospital   Interdisciplinary Plan of Care Collaboration   IDT Collaboration with  Nursing;Physical Therapist;Family / Caregiver   Patient Position at End of Therapy Other (Comments)  (left with PT)   Collaboration Comments RN updated

## 2024-03-14 NOTE — PROGRESS NOTES
4 Eyes Skin Assessment Completed by SANTOSH Mcnally and SANTOSH Vasquez.     Head WDL  Ears Redness and Blanching  Nose WDL  Mouth WDL  Neck WDL  Breast/Chest Bruising and Incision [place]  Shoulder Blades WDL  Spine WDL  (R) Arm/Elbow/Hand Bruising and Scab  (L) Arm/Elbow/Hand Bruising and Scab  Abdomen Chest tube incisions X2  L Groin Bruising  Scrotum/Coccyx/Buttocks WDL  (R) Leg Bruising, Edema, and Incision   (L) Leg Bruising, Edema, and Incisions X3  (R) Heel/Foot/Toe Edema  (L) Heel/Foot/Toe Edema              Devices In Places Tele Box, Blood Pressure Cuff, Pulse Ox, Central Line, SCDs, and Nasal Cannula        Interventions In Place Gray Ear Foams, NC W/Ear Foams, Chair Waffle, Pillows, and Low Air Loss Mattress     Possible Skin Injury No     Pictures Uploaded Into Epic N/A  Wound Consult Placed N/A  RN Wound Prevention Protocol Ordered No

## 2024-03-14 NOTE — THERAPY
"Physical Therapy   Daily Treatment     Patient Name: Julian Christine  Age:  63 y.o., Sex:  male  Medical Record #: [id number]  Today's Date: 3/14/2024     Precautions  Precautions: Fall Risk;Cardiac Precautions (See Comments);Sternal Precautions (See Comments)  Comments: 4vCABG, EF 70%    Assessment    Patient progressing with functional mobility. He mobilized as detailed below, was able to perform transfers and ambulation with FWW without physical assist, he required min A for BLE with bed mobility with log roll.    Reviewed \"Recovering from Heart Surgery\" handout including education regarding:    physiology of cardiovascular system and hemodynamic response;  normal expectations after OHS;  sternal precautions;  home exercise program and appropriate activity progression;  self monitoring via RPE scale/talk test/HR;  activity schedule following OHS;  discharge concerns decision tree;  benefits of outpatient Healthy Heart Program;    Patient was receptive to education and demonstrated understanding but would likely benefit from reinforcement. Patient demonstrated adequate insight to progress home program independently but may benefit from outpatient cardiac rehab. Spouse used internet to find that hospital in [place] does provide outpatient cardiac rehab program and they plan to follow up.    Will follow while patient remains in hospital.      Plan    Treatment Plan Status: Continue Current Treatment Plan  Type of Treatment: Bed Mobility, Gait Training, Neuro Re-Education / Balance, Stair Training, Therapeutic Activities, Therapeutic Exercise  Treatment Frequency: 4 Times per Week  Treatment Duration: Until Therapy Goals Met    DC Equipment Recommendations: Front-Wheel Walker  Discharge Recommendations:  (outpatient cardiac rehab)      Subjective    RN cleared patient for therapy, received from OT in [facility], agreeable and receptive to education     Objective       03/14/24 1003   Charge Group   Charges  Yes   PT " Therapeutic Activities (Units) 1   PT Self Care / Home Evaluation (Units) 2   Total Time Spent   PT Total Time Yes   PT Therapeutic Activities Time Spent (Mins) 15   PT Self Care/Home Evaluation Time Spent (Mins) 33   PT Total Time Spent (Calculated) 48   Precautions   Precautions Fall Risk;Cardiac Precautions (See Comments);Sternal Precautions (See Comments)   Comments 4vCABG, EF 70%   Vitals   O2 (LPM) 0   O2 Delivery Device None - Room Air   Pain 0 - 10 Group   Location Chest   Therapist Pain Assessment During Activity;Nurse Notified   Cognition    Cognition / Consciousness WDL   Level of Consciousness Alert   Comments pleasant, cooperative, demonstrated understanding of education   Active ROM Lower Body    Comments WFL for mobility   Strength Lower Body   Comments WFL for mobility   Balance   Sitting Balance (Static) Fair +   Sitting Balance (Dynamic) Fair +   Standing Balance (Static) Fair   Standing Balance (Dynamic) Fair   Weight Shift Sitting Good   Weight Shift Standing Good   Skilled Intervention Verbal Cuing;Compensatory Strategies;Sequencing   Comments used FWW in standing, no overt LOB   Bed Mobility    Supine to Sit Minimal Assist   Sit to Supine Minimal Assist   Scooting Supervised  (seated)   Rolling Minimum Assist to Lt.   Skilled Intervention Verbal Cuing;Compensatory Strategies;Sequencing;Facilitation   Comments instructed log roll   Gait Analysis   Gait Level Of Assist Standby Assist   Assistive Device Front Wheel Walker   Distance (Feet) 200   # of Times Distance was Traveled 1   Deviation   (decreased elva)   # of Stairs Climbed 0   Weight Bearing Status no restrictions   Vision Deficits Impacting Mobility NT   Skilled Intervention Verbal Cuing;Compensatory Strategies;Sequencing   Functional Mobility   Sit to Stand Contact Guard Assist   Bed, Chair, Wheelchair Transfer Contact Guard Assist   Transfer Method Stand Step   Skilled Intervention Verbal Cuing;Compensatory Strategies;Sequencing    ICU Target Mobility Level   ICU Mobility - Targeted Level Level 4   6 Clicks Assessment - How much HELP from from another person do you currently need... (If the patient hasn't done an activity recently, how much help from another person do you think he/she would need if he/she tried?)   Turning from your back to your side while in a flat bed without using bedrails? 3   Moving from lying on your back to sitting on the side of a flat bed without using bedrails? 3   Moving to and from a bed to a chair (including a wheelchair)? 4   Standing up from a chair using your arms (e.g., wheelchair, or bedside chair)? 4   Walking in hospital room? 4   Climbing 3-5 steps with a railing? 3   6 clicks Mobility Score 21   Patient / Family Goals    Patient / Family Goal #1 go home   Short Term Goals    Short Term Goal # 1 Pt will perform bed mobility with flat bed, no rail with supervision in 6 visits   Goal Outcome # 1 Progressing as expected   Short Term Goal # 2 Pt will transfer with supervision in 6 visits to improve functional indep.   Goal Outcome # 2 Progressing as expected   Short Term Goal # 3 Pt will ambulate x 400 feet using FWW vs no AD with supervision in 6 visits to improve functional indep.   Goal Outcome # 3 Progressing as expected   Short Term Goal # 4 Pt will negotiate 2 stairs with supervision in 6 visits to access home.   Goal Outcome # 4   (not attempted)   Education Group   Education Provided Role of Physical Therapist;Cardiac Precautions;Sternal Precautions   Cardiac Precautions Patient Response Patient;Significant Other;Acceptance;Explanation;Demonstration;Handout;Verbal Demonstration;Action Demonstration;Reinforcement Needed   Sternal Precautions Patient Response Patient;Significant Other;Acceptance;Explanation;Demonstration;Handout;Verbal Demonstration;Action Demonstration;Reinforcement Needed   Role of Physical Therapist Patient Response Patient;Acceptance;Explanation;Verbal Demonstration   Physical  Therapy Treatment Plan   Physical Therapy Treatment Plan Continue Current Treatment Plan   Anticipated Discharge Equipment and Recommendations   DC Equipment Recommendations Front-Wheel Walker   Discharge Recommendations   (outpatient cardiac rehab)   Interdisciplinary Plan of Care Collaboration   IDT Collaboration with  Nursing;Family / Caregiver   Patient Position at End of Therapy Seated;Call Light within Reach;Tray Table within Reach;Phone within Reach;Family / Friend in Room   Collaboration Comments RN aware of visit, response   Session Information   Date / Session Number  3/14-2 (2/4, 3/19)

## 2024-03-14 NOTE — PROGRESS NOTES
4 Eyes Skin Assessment Completed by Ghislaine RN and Sushant RN.    Head WDL  Ears WDL  Nose WDL  Mouth WDL  Neck WDL  Breast/Chest Sternal incision, CT sites  Shoulder Blades WDL  Spine WDL  (R) Arm/Elbow/Hand WDL  (L) Arm/Elbow/Hand WDL  Abdomen WDL  Groin Bruising to L EVH site  Scrotum/Coccyx/Buttocks Redness and Blanching  (R) Leg EVH site w bruising  (L) Leg EVH site w bruising, small abrasion to inner LE  (R) Heel/Foot/Toe Redness and Blanching  (L) Heel/Foot/Toe Redness and Blanching          Devices In Places Tele Box, Blood Pressure Cuff, and Pulse Ox      Interventions In Place Pillows    Possible Skin Injury No    Pictures Uploaded Into Epic Yes  Wound Consult Placed N/A  RN Wound Prevention Protocol Ordered No

## 2024-03-15 LAB
ANION GAP SERPL CALC-SCNC: 13 MMOL/L (ref 7–16)
BUN SERPL-MCNC: 17 MG/DL (ref 8–22)
CALCIUM SERPL-MCNC: 8.3 MG/DL (ref 8.5–10.5)
CHLORIDE SERPL-SCNC: 97 MMOL/L (ref 96–112)
CO2 SERPL-SCNC: 23 MMOL/L (ref 20–33)
CREAT SERPL-MCNC: 0.76 MG/DL (ref 0.5–1.4)
ERYTHROCYTE [DISTWIDTH] IN BLOOD BY AUTOMATED COUNT: 48.8 FL (ref 35.9–50)
GFR SERPLBLD CREATININE-BSD FMLA CKD-EPI: 101 ML/MIN/1.73 M 2
GLUCOSE BLD STRIP.AUTO-MCNC: 115 MG/DL (ref 65–99)
GLUCOSE BLD STRIP.AUTO-MCNC: 127 MG/DL (ref 65–99)
GLUCOSE BLD STRIP.AUTO-MCNC: 136 MG/DL (ref 65–99)
GLUCOSE BLD STRIP.AUTO-MCNC: 157 MG/DL (ref 65–99)
GLUCOSE SERPL-MCNC: 110 MG/DL (ref 65–99)
HCT VFR BLD AUTO: 29.6 % (ref 42–52)
HGB BLD-MCNC: 10.6 G/DL (ref 14–18)
LV EJECT FRACT  99904: 55
MCH RBC QN AUTO: 35.2 PG (ref 27–33)
MCHC RBC AUTO-ENTMCNC: 35.8 G/DL (ref 32.3–36.5)
MCV RBC AUTO: 98.3 FL (ref 81.4–97.8)
PLATELET # BLD AUTO: 211 K/UL (ref 164–446)
PMV BLD AUTO: 11.6 FL (ref 9–12.9)
POTASSIUM SERPL-SCNC: 3.7 MMOL/L (ref 3.6–5.5)
RBC # BLD AUTO: 3.01 M/UL (ref 4.7–6.1)
SODIUM SERPL-SCNC: 133 MMOL/L (ref 135–145)
WBC # BLD AUTO: 11.6 K/UL (ref 4.8–10.8)

## 2024-03-15 PROCEDURE — 700111 HCHG RX REV CODE 636 W/ 250 OVERRIDE (IP)

## 2024-03-15 PROCEDURE — 700111 HCHG RX REV CODE 636 W/ 250 OVERRIDE (IP): Mod: JZ | Performed by: NURSE PRACTITIONER

## 2024-03-15 PROCEDURE — 82962 GLUCOSE BLOOD TEST: CPT | Mod: 91

## 2024-03-15 PROCEDURE — 80048 BASIC METABOLIC PNL TOTAL CA: CPT

## 2024-03-15 PROCEDURE — 700102 HCHG RX REV CODE 250 W/ 637 OVERRIDE(OP): Performed by: NURSE PRACTITIONER

## 2024-03-15 PROCEDURE — A9270 NON-COVERED ITEM OR SERVICE: HCPCS | Mod: JZ

## 2024-03-15 PROCEDURE — 700102 HCHG RX REV CODE 250 W/ 637 OVERRIDE(OP): Mod: JZ

## 2024-03-15 PROCEDURE — 770020 HCHG ROOM/CARE - TELE (206)

## 2024-03-15 PROCEDURE — 85027 COMPLETE CBC AUTOMATED: CPT

## 2024-03-15 PROCEDURE — 94669 MECHANICAL CHEST WALL OSCILL: CPT

## 2024-03-15 PROCEDURE — 99024 POSTOP FOLLOW-UP VISIT: CPT | Performed by: NURSE PRACTITIONER

## 2024-03-15 PROCEDURE — A9270 NON-COVERED ITEM OR SERVICE: HCPCS | Performed by: NURSE PRACTITIONER

## 2024-03-15 RX ADMIN — Medication 1 APPLICATOR: at 08:43

## 2024-03-15 RX ADMIN — METOPROLOL TARTRATE 25 MG: 25 TABLET, FILM COATED ORAL at 16:39

## 2024-03-15 RX ADMIN — POTASSIUM CHLORIDE 10 MEQ: 1500 TABLET, EXTENDED RELEASE ORAL at 16:39

## 2024-03-15 RX ADMIN — DOCUSATE SODIUM 100 MG: 100 CAPSULE, LIQUID FILLED ORAL at 05:42

## 2024-03-15 RX ADMIN — Medication 1 APPLICATOR: at 20:47

## 2024-03-15 RX ADMIN — ACETAMINOPHEN 1000 MG: 500 TABLET, FILM COATED ORAL at 17:46

## 2024-03-15 RX ADMIN — FUROSEMIDE 20 MG: 10 INJECTION INTRAMUSCULAR; INTRAVENOUS at 16:40

## 2024-03-15 RX ADMIN — POTASSIUM CHLORIDE 10 MEQ: 1500 TABLET, EXTENDED RELEASE ORAL at 05:41

## 2024-03-15 RX ADMIN — METOPROLOL TARTRATE 25 MG: 25 TABLET, FILM COATED ORAL at 05:42

## 2024-03-15 RX ADMIN — DOCUSATE SODIUM 100 MG: 100 CAPSULE, LIQUID FILLED ORAL at 16:40

## 2024-03-15 RX ADMIN — OMEPRAZOLE 20 MG: 20 CAPSULE, DELAYED RELEASE ORAL at 05:42

## 2024-03-15 RX ADMIN — ACETAMINOPHEN 1000 MG: 500 TABLET, FILM COATED ORAL at 01:05

## 2024-03-15 RX ADMIN — AMIODARONE HYDROCHLORIDE 400 MG: 200 TABLET ORAL at 05:41

## 2024-03-15 RX ADMIN — DOCUSATE SODIUM 50 MG AND SENNOSIDES 8.6 MG 1 TABLET: 8.6; 5 TABLET, FILM COATED ORAL at 20:47

## 2024-03-15 RX ADMIN — ACETAMINOPHEN 1000 MG: 500 TABLET, FILM COATED ORAL at 11:56

## 2024-03-15 RX ADMIN — LEVOTHYROXINE SODIUM 125 MCG: 0.12 TABLET ORAL at 05:42

## 2024-03-15 RX ADMIN — ASPIRIN 81 MG: 81 TABLET, COATED ORAL at 05:42

## 2024-03-15 RX ADMIN — CLOPIDOGREL BISULFATE 75 MG: 75 TABLET ORAL at 05:39

## 2024-03-15 RX ADMIN — ENOXAPARIN SODIUM 40 MG: 100 INJECTION SUBCUTANEOUS at 16:40

## 2024-03-15 RX ADMIN — ACETAMINOPHEN 1000 MG: 500 TABLET, FILM COATED ORAL at 05:39

## 2024-03-15 RX ADMIN — INSULIN HUMAN 2 UNITS: 100 INJECTION, SOLUTION PARENTERAL at 16:52

## 2024-03-15 RX ADMIN — AMIODARONE HYDROCHLORIDE 400 MG: 200 TABLET ORAL at 16:39

## 2024-03-15 RX ADMIN — FUROSEMIDE 20 MG: 10 INJECTION INTRAMUSCULAR; INTRAVENOUS at 05:39

## 2024-03-15 RX ADMIN — ATORVASTATIN CALCIUM 40 MG: 40 TABLET, FILM COATED ORAL at 20:47

## 2024-03-15 ASSESSMENT — PAIN DESCRIPTION - PAIN TYPE
TYPE: ACUTE PAIN
TYPE: ACUTE PAIN

## 2024-03-15 ASSESSMENT — FIBROSIS 4 INDEX: FIB4 SCORE: 1.61

## 2024-03-15 NOTE — PROGRESS NOTES
Assumed care of patient and received report from RN. Tele monitor in place and patient in SR. VS stable. A&Ox4. Pain 0/10. POC discussed with patient and verbalized understanding. Call light in reach. Fall precautions in place. Bed locked in lowest position.     Fall Risk Score: MODERATE RISK  Fall risk interventions in place: Place yellow fall risk ID band on patient, Provide patient/family education based on risk assessment, Educate patient/family to call staff for assistance when getting out of bed, Place fall precaution signage outside patient door, Place patient in room close to nursing station, Utilize bed/chair fall alarm, and Bed alarm connected correctly  Bed type: Regular (Rene Score less than 17 interventions in place)  Patient on cardiac monitor: Yes  IVF/IV medications: Not Applicable   Oxygen: Room Air  Bedside sitter: Not Applicable   Isolation: Not applicable

## 2024-03-15 NOTE — CARE PLAN
The patient is Stable - Low risk of patient condition declining or worsening    Shift Goals  Clinical Goals:  (mobility, I/os, hemodynamic stability)  Patient Goals: rest, eat  Family Goals: updates, eat    Progress made toward(s) clinical / shift goals:    Problem: Post Op Day 4 CABG/Heart Valve Replacement  Goal: Optimal care of the Post Op CABG/Heart Valve replacement Post Op Day 4  3/15/2024 1650 by Ghislaine Beasley, R.N.  Note: Daily weight completed. Shower completed, incisions cleaned. Pt up to chair for all meals. 2 day shift walks completed.     Problem: Bowel Elimination - Post Surgical  Goal: Patient will resume regular bowel sounds and function with no discomfort or distention    3/15/2024 1647 by Ghislaine Beasley, R.N.  Outcome: Progressing  Note: Pt had BM overnight.      Problem: Fall Risk  Goal: Patient will remain free from falls  Outcome: Progressing  Note: Pt charts as low fall risk.        Patient is not progressing towards the following goals:

## 2024-03-15 NOTE — CARE PLAN
"The patient is Stable - Low risk of patient condition declining or worsening    Shift Goals  Clinical Goals: tele monitor, walk x2, I & Os, hemodynamic stabiltiy, safety  Patient Goals: sleep  Family Goals: updates and eat    Progress made toward(s) clinical / shift goals:    Problem: Knowledge Deficit - Standard  Goal: Patient and family/care givers will demonstrate understanding of plan of care, disease process/condition, diagnostic tests and medications  Outcome: Progressing  Note: Pt educated on POC and disease processes. Pt verbalized understanding of medication regimen and interventions.     Problem: Post Op Day 5 CABG/Heart Valve Replacement  Goal: Optimal care of the Post Op CABG/Heart Valve replacement Post Op Day 5  Outcome: Progressing  Intervention: Daily weights in the morning  Note: Stand up wt taken in AM  Intervention: Shower daily and clean incisions twice daily with soap and water  Note: Pts incisions cleansed.   Intervention: Ambulate 4 times daily, increasing the distance each time  Note: Pt ambulated twice during NOC  Intervention: IS q 1 hour while awake and record best IS volume  Note: IS performed q1 hour awake.   Intervention: Complete \"Home O2 Assessment' in vitals flowsheets if unable to wean off O2  Note: Pt on RA 95%  Intervention: Consider removal of rice, chest tube and pacer wires if not already done  Note: Rice, CT x2 and pacer wires already d/c'd           "

## 2024-03-15 NOTE — PROGRESS NOTES
Bedside report received from off going RN/tech: Judy, assumed care of patient.     Fall Risk Score: LOW RISK  Fall risk interventions in place: Provide patient/family education based on risk assessment and Place patient in room close to nursing station  Bed type: Regular (Rene Score less than 17 interventions in place)  Patient on cardiac monitor: Yes  IVF/IV medications: Not Applicable   Oxygen: Room Air  Bedside sitter: Not Applicable   Isolation: Not applicable

## 2024-03-15 NOTE — PROGRESS NOTES
Cardiovascular Surgery Progress Note    Name: Julian Christine  MRN: [id number]  : 1960  Admit Date: 3/7/2024 10:02 AM  4 Days Post-Op     Procedure:  Procedure(s) and Anesthesia Type:     * CABG X4, WITH ENDOSCOPIC VEIN PROCUREMENT - General     * ECHOCARDIOGRAM, TRANSESOPHAGEAL, INTRAOPERATIVE - General    Vitals:  Vitals:    24 1916 24 2319 03/15/24 0321 03/15/24 0815   BP: 112/61 (!) 140/78 133/72 113/66   Pulse: 65 74 75 67   Resp: 18 18 16 15   Temp:    36.2 °C (97.2 °F)   TempSrc: Temporal Temporal Temporal Oral   SpO2: 97% 94% 92% 90%   Weight:   79.7 kg (175 lb 11.3 oz)    Height:          Temp (24hrs), Av.5 °C (97.7 °F), Min:36.2 °C (97.2 °F), Max:36.7 °C (98.1 °F)      Respiratory:    Respiration: 15, Pulse Oximetry: 90 %       Fluids:    Intake/Output Summary (Last 24 hours) at 3/15/2024 1055  Last data filed at 3/15/2024 0626  Gross per 24 hour   Intake 480 ml   Output 2275 ml   Net -1795 ml     Admit weight: Weight: 77.1 kg (170 lb)  Current weight: Weight: 79.7 kg (175 lb 11.3 oz) (03/15/24 0321)    Labs:  Recent Labs     24  0010 24  0300 03/15/24  0110   WBC 16.0* 14.8* 11.6*   RBC 3.17* 3.10* 3.01*   HEMOGLOBIN 11.0* 10.9* 10.6*   HEMATOCRIT 32.5* 31.7* 29.6*   .5* 102.3* 98.3*   MCH 34.7* 35.2* 35.2*   MCHC 33.8 34.4 35.8   RDW 52.5* 51.8* 48.8   PLATELETCT 145* 172 211   MPV 12.0 11.5 11.6     Recent Labs     24  0010 24  0300 03/15/24  0110   SODIUM 134* 132* 133*   POTASSIUM 4.4 3.8 3.7   CHLORIDE 102 97 97   CO2 23 23 23   GLUCOSE 136* 116* 110*   BUN 19 18 17   CREATININE 0.91 0.74 0.76   CALCIUM 8.3* 8.3* 8.3*             HgbA1c:  6.1     Diabetic Educator Consult:  no    Medications:  Scheduled Medications   Medication Dose Frequency    amiodarone  400 mg BID    furosemide  20 mg BID DIURETIC    potassium chloride SA  10 mEq BID    insulin regular  2-9 Units 4X/DAY ACHS    Nozin nasal  swab  1 Applicator BID    metoprolol tartrate   25 mg BID    aspirin  81 mg DAILY    clopidogrel  75 mg DAILY    atorvastatin  40 mg QHS    acetaminophen  1,000 mg Q6HRS    docusate sodium  100 mg BID    senna-docusate  1 Tablet Nightly    omeprazole  20 mg DAILY    enoxaparin (LOVENOX) injection  40 mg DAILY AT 1800    levothyroxine  125 mcg QAM        Exam:   Physical Exam  Vitals and nursing note reviewed.   Constitutional:       General: He is not in acute distress.  HENT:      Head: Normocephalic.      Right Ear: External ear normal.      Left Ear: External ear normal.      Nose: Nose normal.      Mouth/Throat:      Mouth: Mucous membranes are moist.   Eyes:      Pupils: Pupils are equal, round, and reactive to light.   Cardiovascular:      Rate and Rhythm: Normal rate.      Pulses: Normal pulses.   Pulmonary:      Effort: Pulmonary effort is normal.   Abdominal:      General: Abdomen is flat. There is no distension.      Palpations: Abdomen is soft.   Musculoskeletal:         General: Normal range of motion.      Cervical back: Normal range of motion.      Right lower leg: No edema.      Left lower leg: No edema.   Skin:     General: Skin is warm and dry.      Comments: Sternotomy: open to air, CDI  SVG site: open to air, CDI   Neurological:      Mental Status: He is alert and oriented to person, place, and time.   Psychiatric:         Mood and Affect: Mood normal.         Behavior: Behavior normal.         Judgment: Judgment normal.         Cardiac Medications:    ASA - Yes    Plavix - Yes    Post-operative Beta Blockers - Yes    Ace/ARB- No; contraindicated because of Normal EF    Statin - Yes    Aldactone- No; contraindicated because of Normal EF    SGLT2i-  No contraindicated because of No; contraindicated because of Normal EF    Ejection Fraction:  70%    Telemetry:   3/12: SR  3/13: SR/aFib  3/14: SR  3/15 SR    Assessment/Plan:  POD 1  HDS, extubated on 2L NC, off gtts, neuro intact, wounds CDI, abdomen soft non tender, flatus +, BM -, fluid balance  negative, wt up,  H/h:12/36, Cr:0.80.  Plan: remove mediastinal chest tubes, keep pacing wires. Remove rice once ambulatory, begin gentle diuresis. Encourage IS/ambulate.      POD 2  Hypotension, SR/converted to Afib overnight, remains in afib, on amio gtt, neuro intact, wounds CDI, abdomen soft non tender, +flatus, -BM, fluid balance negative, wt up from admit,  H/h: 11/32.5; Cr:0.91.  Plan: replete 1g mag,  25g albumin, remove rice, encourage IS/ambulate. Keep pacing wires. Keep CIC 1 more day.     POD 3  HDS, SR, Room air, neuro intact, wounds CDI, abdomen soft non tender, fluid balance negative, wt up,  H/h: 10.9/31.7; Cr: 0.74 with good UOP.  Plan:  Pacing wires removed, continue amio drip x 6 hours the transition to PO. Transfer to tele. Encourage IS/ambulate.    POD 4 HDS. SR. RA, 90% spO2.  Neuro intact.  Wounds CDI.  Weight above admit.  +BM.  Cr 0.76 Hgb 10.6.  Plan: Continue diuresis.  Encourage IS/ambulation.  Home in AM.      Disposition:  FWW ordered  PT OT no needs

## 2024-03-16 ENCOUNTER — PHARMACY VISIT (OUTPATIENT)
Dept: PHARMACY | Facility: MEDICAL CENTER | Age: 64
End: 2024-03-16
Payer: COMMERCIAL

## 2024-03-16 VITALS
RESPIRATION RATE: 16 BRPM | WEIGHT: 173.5 LBS | HEART RATE: 71 BPM | OXYGEN SATURATION: 96 % | DIASTOLIC BLOOD PRESSURE: 72 MMHG | BODY MASS INDEX: 24.84 KG/M2 | SYSTOLIC BLOOD PRESSURE: 123 MMHG | HEIGHT: 70 IN | TEMPERATURE: 97.9 F

## 2024-03-16 LAB
ANION GAP SERPL CALC-SCNC: 12 MMOL/L (ref 7–16)
BUN SERPL-MCNC: 17 MG/DL (ref 8–22)
CALCIUM SERPL-MCNC: 8.3 MG/DL (ref 8.5–10.5)
CHLORIDE SERPL-SCNC: 98 MMOL/L (ref 96–112)
CO2 SERPL-SCNC: 24 MMOL/L (ref 20–33)
CREAT SERPL-MCNC: 0.86 MG/DL (ref 0.5–1.4)
ERYTHROCYTE [DISTWIDTH] IN BLOOD BY AUTOMATED COUNT: 49 FL (ref 35.9–50)
GFR SERPLBLD CREATININE-BSD FMLA CKD-EPI: 97 ML/MIN/1.73 M 2
GLUCOSE BLD STRIP.AUTO-MCNC: 105 MG/DL (ref 65–99)
GLUCOSE SERPL-MCNC: 108 MG/DL (ref 65–99)
HCT VFR BLD AUTO: 28.9 % (ref 42–52)
HGB BLD-MCNC: 10.1 G/DL (ref 14–18)
MCH RBC QN AUTO: 35.3 PG (ref 27–33)
MCHC RBC AUTO-ENTMCNC: 34.9 G/DL (ref 32.3–36.5)
MCV RBC AUTO: 101 FL (ref 81.4–97.8)
PLATELET # BLD AUTO: 248 K/UL (ref 164–446)
PMV BLD AUTO: 11.2 FL (ref 9–12.9)
POTASSIUM SERPL-SCNC: 3.6 MMOL/L (ref 3.6–5.5)
RBC # BLD AUTO: 2.86 M/UL (ref 4.7–6.1)
SODIUM SERPL-SCNC: 134 MMOL/L (ref 135–145)
WBC # BLD AUTO: 10 K/UL (ref 4.8–10.8)

## 2024-03-16 PROCEDURE — 85027 COMPLETE CBC AUTOMATED: CPT

## 2024-03-16 PROCEDURE — 94669 MECHANICAL CHEST WALL OSCILL: CPT

## 2024-03-16 PROCEDURE — 82962 GLUCOSE BLOOD TEST: CPT

## 2024-03-16 PROCEDURE — A9270 NON-COVERED ITEM OR SERVICE: HCPCS | Performed by: NURSE PRACTITIONER

## 2024-03-16 PROCEDURE — 700111 HCHG RX REV CODE 636 W/ 250 OVERRIDE (IP)

## 2024-03-16 PROCEDURE — 700102 HCHG RX REV CODE 250 W/ 637 OVERRIDE(OP): Performed by: NURSE PRACTITIONER

## 2024-03-16 PROCEDURE — A9270 NON-COVERED ITEM OR SERVICE: HCPCS | Mod: JZ

## 2024-03-16 PROCEDURE — 99024 POSTOP FOLLOW-UP VISIT: CPT | Performed by: NURSE PRACTITIONER

## 2024-03-16 PROCEDURE — 80048 BASIC METABOLIC PNL TOTAL CA: CPT

## 2024-03-16 PROCEDURE — 700102 HCHG RX REV CODE 250 W/ 637 OVERRIDE(OP): Mod: JZ

## 2024-03-16 PROCEDURE — RXMED WILLOW AMBULATORY MEDICATION CHARGE: Performed by: NURSE PRACTITIONER

## 2024-03-16 PROCEDURE — 93005 ELECTROCARDIOGRAM TRACING: CPT | Performed by: THORACIC SURGERY (CARDIOTHORACIC VASCULAR SURGERY)

## 2024-03-16 RX ORDER — OMEPRAZOLE 20 MG/1
20 CAPSULE, DELAYED RELEASE ORAL DAILY
Qty: 90 CAPSULE | Refills: 0 | Status: SHIPPED | OUTPATIENT
Start: 2024-03-17 | End: 2024-06-15

## 2024-03-16 RX ORDER — AMIODARONE HYDROCHLORIDE 200 MG/1
400 TABLET ORAL 2 TIMES DAILY
Qty: 56 TABLET | Refills: 1 | Status: SHIPPED | OUTPATIENT
Start: 2024-03-16

## 2024-03-16 RX ORDER — FUROSEMIDE 10 MG/ML
20 INJECTION INTRAMUSCULAR; INTRAVENOUS
Status: DISCONTINUED | OUTPATIENT
Start: 2024-03-17 | End: 2024-03-16 | Stop reason: HOSPADM

## 2024-03-16 RX ORDER — ATORVASTATIN CALCIUM 40 MG/1
40 TABLET, FILM COATED ORAL
Qty: 30 TABLET | Refills: 1 | Status: SHIPPED | OUTPATIENT
Start: 2024-03-16

## 2024-03-16 RX ORDER — ACETAMINOPHEN 500 MG
TABLET ORAL
Qty: 30 TABLET | Refills: 0 | Status: SHIPPED | OUTPATIENT
Start: 2024-03-16 | End: 2024-04-13

## 2024-03-16 RX ORDER — FUROSEMIDE 20 MG/1
20 TABLET ORAL DAILY
Qty: 10 TABLET | Refills: 0 | Status: SHIPPED | OUTPATIENT
Start: 2024-03-16 | End: 2024-03-26

## 2024-03-16 RX ORDER — POTASSIUM CHLORIDE 750 MG/1
10 TABLET, EXTENDED RELEASE ORAL DAILY
Qty: 10 TABLET | Refills: 0 | Status: SHIPPED | OUTPATIENT
Start: 2024-03-17 | End: 2024-03-27

## 2024-03-16 RX ORDER — CLOPIDOGREL BISULFATE 75 MG/1
75 TABLET ORAL DAILY
Qty: 90 TABLET | Refills: 0 | Status: SHIPPED | OUTPATIENT
Start: 2024-03-17 | End: 2024-06-15

## 2024-03-16 RX ORDER — ASPIRIN 81 MG/1
81 TABLET ORAL DAILY
COMMUNITY
Start: 2024-03-17

## 2024-03-16 RX ORDER — POTASSIUM CHLORIDE 20 MEQ/1
10 TABLET, EXTENDED RELEASE ORAL DAILY
Status: DISCONTINUED | OUTPATIENT
Start: 2024-03-17 | End: 2024-03-16 | Stop reason: HOSPADM

## 2024-03-16 RX ADMIN — POTASSIUM CHLORIDE 10 MEQ: 1500 TABLET, EXTENDED RELEASE ORAL at 05:16

## 2024-03-16 RX ADMIN — OMEPRAZOLE 20 MG: 20 CAPSULE, DELAYED RELEASE ORAL at 05:11

## 2024-03-16 RX ADMIN — CLOPIDOGREL BISULFATE 75 MG: 75 TABLET ORAL at 05:11

## 2024-03-16 RX ADMIN — DOCUSATE SODIUM 100 MG: 100 CAPSULE, LIQUID FILLED ORAL at 05:11

## 2024-03-16 RX ADMIN — AMIODARONE HYDROCHLORIDE 400 MG: 200 TABLET ORAL at 05:11

## 2024-03-16 RX ADMIN — ACETAMINOPHEN 1000 MG: 500 TABLET, FILM COATED ORAL at 00:09

## 2024-03-16 RX ADMIN — LEVOTHYROXINE SODIUM 125 MCG: 0.12 TABLET ORAL at 05:11

## 2024-03-16 RX ADMIN — ACETAMINOPHEN 1000 MG: 500 TABLET, FILM COATED ORAL at 11:24

## 2024-03-16 RX ADMIN — METOPROLOL TARTRATE 25 MG: 25 TABLET, FILM COATED ORAL at 05:11

## 2024-03-16 RX ADMIN — Medication 1 APPLICATOR: at 08:14

## 2024-03-16 RX ADMIN — ASPIRIN 81 MG: 81 TABLET, COATED ORAL at 05:11

## 2024-03-16 RX ADMIN — FUROSEMIDE 20 MG: 10 INJECTION INTRAMUSCULAR; INTRAVENOUS at 05:11

## 2024-03-16 RX ADMIN — ACETAMINOPHEN 1000 MG: 500 TABLET, FILM COATED ORAL at 05:12

## 2024-03-16 ASSESSMENT — FIBROSIS 4 INDEX: FIB4 SCORE: 1.37

## 2024-03-16 ASSESSMENT — PAIN DESCRIPTION - PAIN TYPE: TYPE: ACUTE PAIN

## 2024-03-16 NOTE — DISCHARGE INSTRUCTIONS
DIVISION OF CARDIAC SURGERY   DISCHARGE INSTRUCTIONS    Activity:    NO driving for 4 weeks after surgery. You may ride as a passenger.  NO lifting, pushing, or pulling more than 10 pounds for 6 weeks.  For the next 6 weeks, keep your elbows close to your body and move within a pain-free motion when lifting, pushing or pulling.  Do not stretch both arms backwards at the same time.    Walk at least 4 times per day, there is no maximum. The goal is to increase your distance over time.  Continue using incentive spirometer for 2 weeks or until your baseline volume is reached.  If you are going home on oxygen and you were not on oxygen prior to surgery, keep using until you are oxygen free.  Weigh yourself daily.  Call your Cardiologist for a weight gain of 3 or more pounds in 1 day or more than 5 pounds in 7 days.  Take all of your medications as prescribed. Do not use a pill box for the first month at home. If you have questions, please call your nurse navigator at [phone].  Continue to wear the MAREN (compression) stockings for 2-4 weeks or until all swelling is gone. You may take them off when you are in bed or when your legs are elevated.    Incision Care:    Make sure to clean your incision(s) TWICE DAILY.  Once by showering AND once using the no rinse Foam cleanser provided in the hospital.  During the shower, cleanse the incision(s) with a perfume and dye free soap (Dial, Dove, [place])  Use gentle pressure and rub up and down over incision with your hands or a washcloth. Rinse off and pat incision(s) dry with clean towel.  Keep the incision open to air. No creams or lotions on your incision(s). No baths.  If there is any increased redness or swelling, separation of the incision line, or thick drainage from any of your incisions, call the Cardiac Surgeons ([phone]).      General Instructions:    You have been referred to Cardiac Rehab.  You can start Cardiac Rehab 30 days after surgery.  If you do  not have an appointment at the time of discharge call [phone] to schedule an appointment.  Your Primary Care Doctor typically handles home oxygen. Oxygen may be stopped when your oxygen level is consistently greater than 90.  Check with your Primary Care Doctor if you are unsure.  Take all of your medications (including pain medications) as prescribed.  Taking medications other than prescribed can result in serious injury.    For Patients Discharged with Narcotic Pain Medication:     If a refill is needed, understand that only 1 refill will be provided and you must come to the Cardiac Surgeons’ office for an appointment (72 hours’ notice is required to schedule and there are no weekend appointments).  If the pain medications you are discharged on are not working, you will need to bring your remaining prescription into the office in order to receive a new prescription.  If you were taking narcotics prior to your heart surgery, the Cardiac Surgeons will provide you with one prescription and additional medications will need to be provided by your pain management doctor.  Do not drink alcohol while taking narcotics.  Lost or stolen medications will not be refilled.  If medications are stolen, report to law enforcement.    Contact Cardiac Surgery at [phone] if you have any questions.

## 2024-03-16 NOTE — DISCHARGE SUMMARY
DISCHARGE SUMMARY    ADMISSION DATE: 3/7/2024    DISCHARGE DATE: 3/16/2024    ADMITTING DIAGNOSES: Non-STEMI, multivessel coronary artery disease, type 2 diabetes mellitus, hypertension, dyslipidemia, history of Hodgkin's lymphoma with chest radiation, status postsplenectomy     DISCHARGE DIAGNOSES: Non-STEMI, multivessel coronary artery disease, type 2 diabetes mellitus, hypertension, dyslipidemia, history of Hodgkin's lymphoma with chest radiation, status postsplenectomy     PROCEDURES PERFORMED:   Dr. Chapman 3/11/2024   CABG X4 WITH SKELETONIZED TRUONG TO LAD, RSVG TO DIAG 2, OM2, AND RPLB   ENDOSCOPIC VEIN PROCUREMENT OF LEFT GREATER SAPHENOUS VEIN  ECHOCARDIOGRAM, TRANSESOPHAGEAL, INTRAOPERATIVE    HISTORY OF PRESENT ILLNESS:     The patient is a 63 y.o. male with past medical history of diabetes, 40 years of tobacco use, hypertension, and dyslipidemia who presents with chest pain. This started one day prior to admission. He denies shortness of breath, orthopnea, PND, dizziness, and syncope. No aggravating factors identified. Chest pain was relieved after aspirin and heparin. He presented to the emergency department in [place] where he was found to have elevated troponin and was transferred to [facility]. Angiogram revealed multivessel coronary artery disease. He was set-up for urgent coronary artery bypass graffting.     HOSPITAL COURSE:   POD 1  HDS, extubated on 2L NC, off gtts, neuro intact, wounds CDI, abdomen soft non tender, flatus +, BM -, fluid balance negative, wt up,  H/h:12/36, Cr:0.80.  Plan: remove mediastinal chest tubes, keep pacing wires. Remove rice once ambulatory, begin gentle diuresis. Encourage IS/ambulate.       POD 2  Hypotension, SR/converted to Afib overnight, remains in afib, on amio gtt, neuro intact, wounds CDI, abdomen soft non tender, +flatus, -BM, fluid balance negative, wt up from admit,  H/h: 11/32.5; Cr:0.91.  Plan: replete 1g mag,  25g albumin,  remove rice, encourage IS/ambulate. Keep pacing wires. Keep CIC 1 more day.      POD 3  HDS, SR, Room air, neuro intact, wounds CDI, abdomen soft non tender, fluid balance negative, wt up,  H/h: 10.9/31.7; Cr: 0.74 with good UOP.  Plan:  Pacing wires removed, continue amio drip x 6 hours the transition to PO. Transfer to tele. Encourage IS/ambulate.     POD 4 HDS. SR. RA, 90% spO2.  Neuro intact.  Wounds CDI.  Weight above admit.  +BM.  Cr 0.76 Hgb 10.6.  Plan: Continue diuresis.  Encourage IS/ambulation.  Home in AM.      POD 5 HDS, SR, Room air, ambulating, 1 kg positive- cont gentle diuresis for 10 days, incision - c/d/I, home today    TELEMETRY:  3/12: SR  3/13: SR/aFib  3/14: SR  3/15 SR  3/16 SR    RECENT LABS:     Lab Results   Component Value Date/Time    SODIUM 134 (L) 03/16/2024 12:15 AM    POTASSIUM 3.6 03/16/2024 12:15 AM    CHLORIDE 98 03/16/2024 12:15 AM    CO2 24 03/16/2024 12:15 AM    GLUCOSE 108 (H) 03/16/2024 12:15 AM    BUN 17 03/16/2024 12:15 AM    CREATININE 0.86 03/16/2024 12:15 AM      Lab Results   Component Value Date/Time    WBC 10.0 03/16/2024 12:15 AM    RBC 2.86 (L) 03/16/2024 12:15 AM    HEMOGLOBIN 10.1 (L) 03/16/2024 12:15 AM    HEMATOCRIT 28.9 (L) 03/16/2024 12:15 AM    .0 (H) 03/16/2024 12:15 AM    MCH 35.3 (H) 03/16/2024 12:15 AM    MCHC 34.9 03/16/2024 12:15 AM    MPV 11.2 03/16/2024 12:15 AM    NEUTSPOLYS 40.80 (L) 03/11/2024 01:00 AM    LYMPHOCYTES 42.60 (H) 03/11/2024 01:00 AM    MONOCYTES 10.80 03/11/2024 01:00 AM    EOSINOPHILS 4.20 03/11/2024 01:00 AM    BASOPHILS 1.10 03/11/2024 01:00 AM      Lab Results   Component Value Date/Time    PROTHROMBTM 16.2 (H) 03/11/2024 12:27 PM    INR 1.28 (H) 03/11/2024 12:27 PM        Fluids:    Intake/Output Summary (Last 24 hours) at 3/16/2024 0827  Last data filed at 3/16/2024 0520  Gross per 24 hour   Intake 250 ml   Output 1530 ml   Net -1280 ml     Admit weight: Weight: 77.1 kg (170 lb)  Current weight: Weight: 78.7 kg (173 lb 8  oz) (03/16/24 0329)    ALLERGIES:     Patient has no known allergies.    EJECTION FRACTION:  55%    CARDIAC MEDICATIONS:    ASA - Yes    Plavix - Yes    Post-operative Beta Blockers - Yes    Ace/ARB- No; contraindicated because of Normal EF    Statin - Yes    Aldactone- No; contraindicated because of Normal EF    SGLT2i-  No contraindicated because of No; contraindicated because of Normal EF    DISCHARGE MEDICATIONS:      Medication List        START taking these medications        Instructions   acetaminophen 500 MG Tabs  Start taking on: March 16, 2024  Commonly known as: Tylenol   Take 2 Tablets by mouth every 6 hours for 14 days, THEN 2 Tablets every 6 hours as needed for Mild Pain for up to 14 days.     amiodarone 200 MG Tabs  Commonly known as: Cordarone   Take 2 Tablets by mouth 2 times a day. x 7 days, Then Take Two Tablets daily for 7 days, Then Take 1 Tablet Daily.  Dose: 400 mg     aspirin 81 MG EC tablet  Start taking on: March 17, 2024   Take 1 Tablet by mouth every day.  Dose: 81 mg     clopidogrel 75 MG Tabs  Start taking on: March 17, 2024  Commonly known as: Plavix   Take 1 Tablet by mouth every day for 90 days.  Dose: 75 mg     furosemide 20 MG Tabs  Commonly known as: Lasix   Take 1 Tablet by mouth every day for 10 days.  Dose: 20 mg     metoprolol tartrate 25 MG Tabs  Commonly known as: Lopressor   Take 1 Tablet by mouth 2 times a day.  Dose: 25 mg     omeprazole 20 MG delayed-release capsule  Start taking on: March 17, 2024  Commonly known as: PriLOSEC   Take 1 Capsule by mouth every day for 90 days.  Dose: 20 mg     potassium chloride SA 10 MEQ Tbcr  Start taking on: March 17, 2024  Commonly known as: K-Dur   Take 1 Tablet by mouth every day for 10 days.  Dose: 10 mEq            CONTINUE taking these medications        Instructions   allopurinol 300 MG Tabs  Commonly known as: Zyloprim   Take 300 mg by mouth every day. IN THE AFTERNOON  Dose: 300 mg     Alpha-Lipoic Acid 300 MG Tabs   Take 300  mg by mouth every day. IN THE AFTERNOON  Dose: 300 mg     atorvastatin 10 MG Tabs  Commonly known as: Lipitor   Take 10 mg by mouth every day. IN THE AFTERNOON  Dose: 10 mg     Constulose 10 GM/15ML Soln  Generic drug: lactulose   Take 10 g by mouth at bedtime.  Dose: 10 g     CYANOCOBALAMIN PO   Take 1 Tablet by mouth every morning.  Dose: 1 Tablet     Magnesium 250 MG Tabs   Take 250 mg by mouth every morning.  Dose: 250 mg     metFORMIN 500 MG Tabs  Commonly known as: Glucophage   Take 500 mg by mouth every morning.  Dose: 500 mg     ONE-DAILY MULTIVITAMINS Tabs   Take 1 Tablet by mouth every day. IN THE AFTERNOON  Dose: 1 Tablet     Synthroid 125 MCG Tabs  Generic drug: levothyroxine   Take 125 mcg by mouth every morning.  Dose: 125 mcg     tamsulosin 0.4 MG capsule  Commonly known as: Flomax   Take 0.4 mg by mouth every day. IN THE AFTERNOON  Dose: 0.4 mg            STOP taking these medications      lisinopril 10 MG Tabs  Commonly known as: Prinivil              DIET:   Cardiac diet    DISCHARGE INSTRUCTIONS DISCUSSED WITH THE PATIENT:      1. NO driving for 4 weeks after surgery. You may ride as a passenger.  2. NO lifting of any item over 10 lbs (e.g. gallon of milk) for 6 weeks after surgery.  3. DO walk as much as possible! Walk a minimum of once a day. Depending on your fatigue and comfort level, you may walk as much as you wish. There is no maximum.  4. Other physical activities (sex, housework, gardening, etc.) are OK after 4 weeks   5. Continue using incentive spirometer for 2 weeks, especially if going home on oxygen.    Incision Care:  1. SHOWER ONLY - no baths. Clean incision daily with plain Ivory ® soap or any other dye or perfume free soap. Then pat incision dry with clean towel. Avoid creams or lotions on the incision(s).  a. If there is any increase in redness or swelling, or separation of the incision line, or thick drainage* from any of the incisions, call right away  * Clear, thin drainage  is not abnormal especially from the leg incision and/or                         chest tube sites.  2. Continue to wear your MAREN Stockings for 4 weeks. You may take off the stockings when in bed or when the legs are elevated.    Patient instructed to call [facility] cardiac surgery at [phone]  if any increased shortness of breath, uncontrolled pain, weight gain greater than 3 pounds in 1 day or 5 pounds in 1 week, SBP >140, HR <60 or redness swelling or drainage of incisions.      FOLLOW-UP:   Future Appointments   Date Time Provider Department Center   4/4/2024  1:15 PM NEL Martinez [facility] None   4/15/2024 12:15 PM CT RESOURCE PROVIDER [facility] None

## 2024-03-16 NOTE — PROGRESS NOTES
0713 - Report received from Cristina FROST at patient's bedside. Patient resting in bed quietly with no complaints at this time. Telemetry monitor intact et functioning. Call light and belongings within reach, safety measures intact, white board updated.     0830 - PIV removed with immediate hemostasis.     1030 - Central line removed with hemostasis after 10 minutes manual pressure held.     1100 - Discharge instructions reviewed with patient and patient's spouse at bedside by Brenda FROST. Notified telemetry monitoring that patient is coming off telemetry for discharge. Verbalized understanding. Telemetry box removed and returned to desk. All belongings packed and sent with patient, who was taken via wheelchair to private vehicle for transport home.

## 2024-03-16 NOTE — CARE PLAN
The patient is Stable - Low risk of patient condition declining or worsening    Shift Goals  Clinical Goals: open heart care plan  Patient Goals: sleep, DC  Family Goals: updates, eat    Progress made toward(s) clinical / shift goals:  DC home       Problem: Knowledge Deficit - Standard  Goal: Patient and family/care givers will demonstrate understanding of plan of care, disease process/condition, diagnostic tests and medications  Outcome: Met     Problem: Pain - Standard  Goal: Alleviation of pain or a reduction in pain to the patient’s comfort goal  Outcome: Met     Problem: Pre Op  Goal: Optimal preparation for CABG/Heart Valve surgery  Outcome: Met     Problem: Skin Integrity  Goal: Skin integrity is maintained or improved  Outcome: Met     Problem: Fall Risk  Goal: Patient will remain free from falls  Outcome: Met     Problem: Day of surgery post CABG/Heart valve replacement  Goal: Stabilization in immediate post op period  Outcome: Met     Problem: Post Op Day 1 CABG/Heart Valve Replacement  Goal: Optimal care of the post op CABG/heart valve replacement Post Op Day 1  Outcome: Met     Problem: Post Op Day 3 CABG/Heart Valve replacement  Goal: Optimal care of the post op CABG/Heart Valve replacement post op day 3  Outcome: Met     Problem: Post Op Day 4 CABG/Heart Valve Replacement  Goal: Optimal care of the Post Op CABG/Heart Valve replacement Post Op Day 4  Outcome: Met     Problem: Post Op Day 5 CABG/Heart Valve Replacement  Goal: Optimal care of the Post Op CABG/Heart Valve replacement Post Op Day 5  Outcome: Met     Problem: Hemodynamics  Goal: Patient's hemodynamics, fluid balance and neurologic status will be stable or improve  Outcome: Met     Problem: Respiratory  Goal: Patient will achieve/maintain optimum respiratory ventilation and gas exchange  Outcome: Met     Problem: Risk for Aspiration  Goal: Patient's risk for aspiration will be absent or decrease  Outcome: Met     Problem: Nutrition -  Advanced  Goal: Patient will display progressive weight gain toward goal have adequate food and fluid intake  Outcome: Met     Problem: Self Care  Goal: Patient will have the ability to perform ADLs independently or with assistance (bathe, groom, dress, toilet and feed)  Outcome: Met     Problem: Bowel Elimination - Post Surgical  Goal: Patient will resume regular bowel sounds and function with no discomfort or distention  Outcome: Met     Problem: Post op day 2 CABG/Heart Valve Replacement  Goal: Optimal care of the post op CABG/heart valve replacement post op day 2  Outcome: Met       Patient is not progressing towards the following goals:

## 2024-03-16 NOTE — PROGRESS NOTES
Monitor Summary  Rhythm: SR  Rate: 62-86  Ectopy: rPVC  Measurement: .11/.09/.46          12 hr chart check

## 2024-03-16 NOTE — PROGRESS NOTES
Bedside report received from off going RN/tech: SANTOSH Scanlon, assumed care of patient.     Fall Risk Score: LOW RISK  Fall risk interventions in place: Place yellow fall risk ID band on patient, Provide patient/family education based on risk assessment, Educate patient/family to call staff for assistance when getting out of bed, Place fall precaution signage outside patient door, and Place patient in room close to nursing station  Bed type: Regular (Rene Score less than 17 interventions in place)  Patient on cardiac monitor: Yes  IVF/IV medications: Not Applicable   Oxygen: Room Air  Bedside sitter: Not Applicable   Isolation: Not applicable

## 2024-03-16 NOTE — CARE PLAN
The patient is Stable - Low risk of patient condition declining or worsening    Shift Goals  Clinical Goals: open heart care plan  Patient Goals: sleep, DC  Family Goals: updates, eat    Progress made toward(s) clinical / shift goals:    Problem: Knowledge Deficit - Standard  Goal: Patient and family/care givers will demonstrate understanding of plan of care, disease process/condition, diagnostic tests and medications  Outcome: Progressing     Problem: Pain - Standard  Goal: Alleviation of pain or a reduction in pain to the patient’s comfort goal  Outcome: Progressing     Problem: Post Op Day 4 CABG/Heart Valve Replacement  Goal: Optimal care of the Post Op CABG/Heart Valve replacement Post Op Day 4  Outcome: Progressing   IS in use while awake with 1750 best effort, patient up self and exceeding four walks daily, MAREN hose in use, up to chair for meals, incision care complete.    Problem: Hemodynamics  Goal: Patient's hemodynamics, fluid balance and neurologic status will be stable or improve  Outcome: Progressing     Problem: Respiratory  Goal: Patient will achieve/maintain optimum respiratory ventilation and gas exchange  Outcome: Progressing       Patient is not progressing towards the following goals:

## 2024-03-16 NOTE — PROGRESS NOTES
Bedside report received from off going RN/tech: Cristina, assumed care of patient.     Fall Risk Score: LOW RISK  Fall risk interventions in place: Place yellow fall risk ID band on patient and Provide patient/family education based on risk assessment  Bed type: Regular (Rene Score less than 17 interventions in place)  Patient on cardiac monitor: Yes  IVF/IV medications: Not Applicable   Oxygen: Room Air  Bedside sitter: Not Applicable   Isolation: Not applicable

## 2024-03-17 LAB — EKG IMPRESSION: NORMAL

## 2024-03-17 PROCEDURE — 93010 ELECTROCARDIOGRAM REPORT: CPT | Performed by: INTERNAL MEDICINE

## 2024-03-20 ENCOUNTER — TELEPHONE (OUTPATIENT)
Dept: CARDIOTHORACIC SURGERY | Facility: MEDICAL CENTER | Age: 64
End: 2024-03-20
Payer: COMMERCIAL

## 2024-03-20 NOTE — TELEPHONE ENCOUNTER
Chief Concern: SBP's in the 140's for a few days.    Per MING Langford, resume home lisinopril 10 mg daily.    Hospital follow up call    he is showering everyday or every other day.    he states that all incisions are healing well and approximated with no s/s of infection (redness, puss, fever, purulent drainage, or tenderness).    he states that the post op pain is well controlled.  Narcotics are not being utilized. Tylenol is being utilized.    he is using the IS; volume is not improved.    he is walking everyday, distance is increased from the hospital.    he is obtaining daily weights. Current weight is 171.2 lbs which is less than the first home weight of 173.2 lbs. he is wearing the compression/MAREN hose. He confirms LE edema, improved from the hospital    he is taking all prescribed meds as directed.  He has no medication questions.    he is taking vitals (HR and BP).    BP's: 140's  HR's: 60's to 70's    he has had a bowel movement since discharge.    He has no additional questions at this time. Follow up education was not needed on anything. Follow up appointments were reviewed and I ensured they have my number if issues or concerns arise.      Follow up call time was 9 minutes.

## 2024-03-22 ENCOUNTER — TELEPHONE (OUTPATIENT)
Dept: CARDIOTHORACIC SURGERY | Facility: MEDICAL CENTER | Age: 64
End: 2024-03-22
Payer: COMMERCIAL

## 2024-03-22 NOTE — TELEPHONE ENCOUNTER
Problem: VTE, Risk for  Goal: # No s/s of VTE  Outcome: Outcome Met, Continue evaluating goal progress toward completion     Problem: Breathing Pattern Ineffective  Goal: Air exchange is effective, demonstrated by Sp02 sat of greater then or = 92% (or as ordered)  Outcome: Outcome Met, Continue evaluating goal progress toward completion   Remains on 2L of 02 NC   Left VM for patient checking on on HTN and if restarting lisinopril has helped.

## 2024-03-26 NOTE — PROGRESS NOTES
"CHIEF COMPLAINT: Post-op visit     PROCEDURE: Dr. Chapman 3/11/2024   CABG X4 WITH SKELETONIZED TRUONG TO LAD, RSVG TO DIAG 2, OM2, AND RPLB   ENDOSCOPIC VEIN PROCUREMENT OF LEFT GREATER SAPHENOUS VEIN  ECHOCARDIOGRAM, TRANSESOPHAGEAL, INTRAOPERATIVE    HPI: He has been doing well postoperatively.  He has been walking daily for exercise.  He has been seeing cardiology who has been titrating his blood pressure meds for his hypertension.  His wounds are nearly fully healed.      /62 (BP Location: Left arm, Patient Position: Sitting, BP Cuff Size: Adult)   Pulse 70   Temp 36.6 °C (97.9 °F) (Temporal)   Ht 1.778 m (5' 10\")   Wt 75.8 kg (167 lb)   SpO2 96%     PHYSICAL EXAM:  Cardiac: S1S2  Neuro:  AAO x 3  Resp:  CTA  Wounds:  CDI  Sternum:  Stable  LSVG- Scab, minimal erythema.  No drainage or purulence    PLAN: Discharge from clinic  Continue plavix for 3 months or per your Cardiologist's recommendations.  We reviewed post operative sternal precautions, weight limits and driving precautions moving forward.  We reviewed SBE prophylaxis.      Overall, we are very pleased with the patient’s progress and we have instructed the patient to follow-up with us in the future should they have any concerns related to their surgery. Otherwise, we will see the patient on a PRN basis. The patient will continue to follow-up with their Cardiologist and PCP.  The patient has been informed that any further prescription refills should be done through their primary care physician and/or cardiologist.  They acknowledged understanding.  Thank you for allowing us to participate in the care of this very pleasant patient and please let us know if there is any way we may be of further assistance.    "

## 2024-04-01 ENCOUNTER — TELEPHONE (OUTPATIENT)
Dept: CARDIOTHORACIC SURGERY | Facility: MEDICAL CENTER | Age: 64
End: 2024-04-01
Payer: COMMERCIAL

## 2024-04-01 NOTE — TELEPHONE ENCOUNTER
SBP in the 140's in the morning, down to 120's by the evening.  He did restart his home lisinopril 10 mg last week.  He sees cardiology in 4 days.    He was told to have cardiology address this during his visit and to call me in the meantime if his SBP goes > 160 before then.    Call time 3 minutes

## 2024-04-02 NOTE — PROGRESS NOTES
Chief Complaint   Patient presents with    Hypertension     F/V Dx: Primary hypertension    Atrial Fibrillation     F/V Dx: Atrial fibrillation with RVR (HCC)        Subjective     Julian Christine is a 63 y.o. male who presents today as a follow up after his CABG surgery.     PMHx of multivessel coronary artery disease, s/p CABG x 4 (3/11/2024), type 2 diabetes mellitus, hypertension, dyslipidemia, hypothyroid, history of Hodgkin's lymphoma with chest radiation, status postsplenectomy.     He is new to our cardiology office.     Patient presented to [place] with chest pain, found to have elevated troponin and was transferred to [facility]. He was referred to the Cath Lab for unstable angina. His coronary angiogram showed severe multivessel coronary artery disease. He was referred to cardiothoracic surgery and bypass surgery was recommended. On 3/11/2024, he underwent coronary artery bypass grafting x 4 by Dr. Chapman (TRUONG TO LAD, RSVG TO DIAG 2, OM2, AND RPLB) with Left greater saphenous vein procurement.   His postop recovery was complicated by atrial fibrillation on postop day 2 for which he was treated with an amiodarone drip and discharged on oral amiodarone. He was in sinus rhythm when he was discharged to home.     Today in clinic he presents with his wife Ruby.     Patient is doing really well since surgery.  He denies chest pain or pressure, lightheadedness, dizziness, palpitations.   He reports mild pain, felt in his ribs. He takes Tylenol for it. His sternal incision is healing well, it is well-approximated with scabbing, without redness or oozing. EVH site is healing well with some scabbing. He has been compliant with sternal precautions, he shower daily, and is walking 3 miles daily.     He notes slight lower extremity swelling.  He stopped using MAREN hose compression stockings.  He is no longer using his incentive spirometer but is willing to restart.    He has had a constant runny nose and postnasal  drainage since surgery, that he thinks could be related to seasonal allergies.      Cardiovascular Risk Factors:  1. Smoking status: 40 years of tobacco use, quit in   2. Type II Diabetes Mellitus: A1c 6.1   3. Hypertension: yes  4. Dyslipidemia: LDL 36 (while on statin)  5. Family history of early Coronary Artery Disease in a first degree relative (Male less than 55 years of age; Female less than 65 years of age): maternal grandfather - OHS, uncles with OHS,   6.  Obesity and/or Metabolic Syndrome: no  7. Sedentary lifestyle: golf, walking, hiking. Since  lost 60 lbs - intentionally bu diet and exercise.   Mother  of ovarian cancer at 67           Past Medical History:   Diagnosis Date    Diabetes (HCC)     Hypertension      Past Surgical History:   Procedure Laterality Date    MULTIPLE CORONARY ARTERY BYPASS ENDO VEIN HARVEST  3/11/2024    Procedure: CABG X4, WITH ENDOSCOPIC VEIN PROCUREMENT;  Surgeon: Salas Chapman D.O.;  Location: SURGERY [facility];  Service: Cardiothoracic    ECHOCARDIOGRAM, TRANSESOPHAGEAL, INTRAOPERATIVE  3/11/2024    Procedure: ECHOCARDIOGRAM, TRANSESOPHAGEAL, INTRAOPERATIVE;  Surgeon: Salas Chapman D.O.;  Location: SURGERY [facility];  Service: Cardiothoracic    SPLENECTOMY       History reviewed. No pertinent family history.  Social History     Socioeconomic History    Marital status:      Spouse name: Not on file    Number of children: Not on file    Years of education: Not on file    Highest education level: Not on file   Occupational History    Not on file   Tobacco Use    Smoking status: Never    Smokeless tobacco: Never   Vaping Use    Vaping Use: Never used   Substance and Sexual Activity    Alcohol use: Not Currently     Comment: 2-3 scotch drinks nightly    Drug use: Not Currently     Types: Inhaled     Comment: marijuana    Sexual activity: Not on file   Other Topics Concern    Not on file   Social History Narrative    Not on file     Social  Determinants of Health     Financial Resource Strain: Not on file   Food Insecurity: Not on file   Transportation Needs: Not on file   Physical Activity: Not on file   Stress: Not on file   Social Connections: Not on file   Intimate Partner Violence: Not on file   Housing Stability: Not on file     No Known Allergies  Outpatient Encounter Medications as of 4/4/2024   Medication Sig Dispense Refill    amiodarone (CORDARONE) 200 MG Tab Take 200 mg by mouth every day.      lisinopril (PRINIVIL) 10 MG Tab Take 10 mg by mouth every day.      atorvastatin (LIPITOR) 40 MG Tab Take 1 Tablet by mouth at bedtime. 90 Tablet 3    clopidogrel (PLAVIX) 75 MG Tab Take 1 Tablet by mouth every day for 360 days. 90 Tablet 3    metoprolol SR (TOPROL XL) 25 MG TABLET SR 24 HR Take 1 Tablet by mouth every day. 90 Tablet 3    acetaminophen (TYLENOL) 500 MG Tab Take 2 Tablets by mouth every 6 hours for 14 days, THEN 2 Tablets every 6 hours as needed for Mild Pain for up to 14 days. 30 Tablet 0    aspirin 81 MG EC tablet Take 1 Tablet by mouth every day.      omeprazole (PRILOSEC) 20 MG delayed-release capsule Take 1 Capsule by mouth every day for 90 days. 90 Capsule 0    Alpha-Lipoic Acid 300 MG Tab Take 300 mg by mouth every day. IN THE AFTERNOON      CYANOCOBALAMIN PO Take 1 Tablet by mouth every morning.      Magnesium 250 MG Tab Take 250 mg by mouth every morning.      Multiple Vitamin (ONE-DAILY MULTIVITAMINS) Tab Take 1 Tablet by mouth every day. IN THE AFTERNOON      metFORMIN (GLUCOPHAGE) 500 MG Tab Take 500 mg by mouth every morning.      allopurinol (ZYLOPRIM) 300 MG Tab Take 300 mg by mouth every day. IN THE AFTERNOON      tamsulosin (FLOMAX) 0.4 MG capsule Take 0.4 mg by mouth every day. IN THE AFTERNOON      levothyroxine (SYNTHROID) 125 MCG Tab Take 125 mcg by mouth every morning.      lactulose (CONSTULOSE) 10 GM/15ML Solution Take 10 g by mouth at bedtime.      amiodarone (CORDARONE) 200 MG Tab Take 2 Tablets by mouth 2  "times a day. x 7 days, Then Take Two Tablets daily for 7 days, Then Take 1 Tablet Daily. (Patient not taking: Reported on 4/4/2024) 56 Tablet 1    [DISCONTINUED] clopidogrel (PLAVIX) 75 MG Tab Take 1 Tablet by mouth every day for 90 days. 90 Tablet 0    [DISCONTINUED] metoprolol tartrate (LOPRESSOR) 25 MG Tab Take 1 Tablet by mouth 2 times a day. 60 Tablet 2    [DISCONTINUED] atorvastatin (LIPITOR) 40 MG Tab Take 1 Tablet by mouth at bedtime. 30 Tablet 1     No facility-administered encounter medications on file as of 4/4/2024.     Review of Systems   Constitutional:  Negative for chills, fever and malaise/fatigue.   HENT:  Positive for congestion.    Respiratory:  Negative for cough, sputum production, shortness of breath and wheezing.    Cardiovascular:  Positive for leg swelling. Negative for chest pain, palpitations, orthopnea, claudication and PND.   Gastrointestinal:  Negative for abdominal pain, blood in stool, nausea and vomiting.   Musculoskeletal: Negative.    Neurological:  Negative for dizziness, weakness and headaches.   Endo/Heme/Allergies:  Does not bruise/bleed easily.   Psychiatric/Behavioral: Negative.                Objective     /68 (BP Location: Left arm, Patient Position: Sitting, BP Cuff Size: Adult)   Pulse 76   Resp 16   Ht 1.778 m (5' 10\")   Wt 75.8 kg (167 lb)   SpO2 96%   BMI 23.96 kg/m²     Physical Exam  Constitutional:       Appearance: Normal appearance.   HENT:      Head: Normocephalic and atraumatic.      Mouth/Throat:      Mouth: Mucous membranes are moist.   Cardiovascular:      Rate and Rhythm: Normal rate and regular rhythm.      Pulses: Normal pulses.      Heart sounds: Normal heart sounds. No murmur heard.     No friction rub. No gallop.   Pulmonary:      Effort: Pulmonary effort is normal.      Breath sounds: Normal breath sounds. No wheezing or rhonchi.   Abdominal:      General: There is no distension.      Palpations: Abdomen is soft.   Musculoskeletal:      " Right lower leg: No edema.      Left lower leg: Edema (trace edema) present.   Skin:     General: Skin is warm and dry.      Coloration: Skin is not pale.      Comments: Sternum is healing well, CDI,  EVH site is healing well, CDI   Neurological:      Mental Status: He is alert and oriented to person, place, and time.   Psychiatric:         Mood and Affect: Mood normal.         Behavior: Behavior normal.         Thought Content: Thought content normal.         Judgment: Judgment normal.              Lab Results   Component Value Date/Time    CHOLSTRLTOT 107 03/08/2024 03:56 AM    LDL 36 03/08/2024 03:56 AM    HDL 57 03/08/2024 03:56 AM    TRIGLYCERIDE 72 03/08/2024 03:56 AM       Lab Results   Component Value Date/Time    SODIUM 134 (L) 03/16/2024 12:15 AM    POTASSIUM 3.6 03/16/2024 12:15 AM    CHLORIDE 98 03/16/2024 12:15 AM    CO2 24 03/16/2024 12:15 AM    GLUCOSE 108 (H) 03/16/2024 12:15 AM    BUN 17 03/16/2024 12:15 AM    CREATININE 0.86 03/16/2024 12:15 AM     Lab Results   Component Value Date/Time    ALKPHOSPHAT 68 03/10/2024 08:28 AM    ASTSGOT 29 03/10/2024 08:28 AM    ALTSGPT 29 03/10/2024 08:28 AM    TBILIRUBIN 0.6 03/10/2024 08:28 AM           Latest Reference Range & Units 03/08/24 03:56   Cholesterol,Tot 100 - 199 mg/dL 107   Triglycerides 0 - 149 mg/dL 72   HDL >=40 mg/dL 57   LDL <100 mg/dL 36       PROCEDURES PERFORMED:   Dr. Chapman 3/11/2024   CABG X4 WITH SKELETONIZED TRUONG TO LAD, RSVG TO DIAG 2, OM2, AND RPLB   ENDOSCOPIC VEIN PROCUREMENT OF LEFT GREATER SAPHENOUS VEIN  ECHOCARDIOGRAM, TRANSESOPHAGEAL, INTRAOPERATIVE    OH 3/11/2024  Echocardiography Laboratory  CONCLUSIONS  Intraoperative OH performed for CABG surgery  LV EF 55%, preserved after CPB  Grade 1 diastolic dysfunction  Mild MS/MR  No other valvular issues    Echocardiography Laboratory 3/7/2024     CONCLUSIONS  No prior study is available for comparison.   The left ventricle is normal in size and thickness.  The left  ventricular ejection fraction is visually estimated to be 70%.  Reduced right ventricular systolic function.  Mild mitral regurgitation.  Normal inferior vena cava size and inspiratory collapse.     ECG 4/4/2024 -  is reviewed by me personally, demonstrates normal sinus rhythm.        Assessment & Plan     1. S/P CABG x 4  EKG    amiodarone (CORDARONE) 200 MG Tab    lisinopril (PRINIVIL) 10 MG Tab    atorvastatin (LIPITOR) 40 MG Tab    clopidogrel (PLAVIX) 75 MG Tab    metoprolol SR (TOPROL XL) 25 MG TABLET SR 24 HR    Cardiac Event Monitor      2. Primary hypertension  amiodarone (CORDARONE) 200 MG Tab    metoprolol SR (TOPROL XL) 25 MG TABLET SR 24 HR      3. Hyperlipidemia, unspecified hyperlipidemia type  atorvastatin (LIPITOR) 40 MG Tab      4. Type 2 diabetes mellitus with diabetic neuropathy, without long-term current use of insulin ([facility])        5. ACS (acute coronary syndrome) ([facility])  clopidogrel (PLAVIX) 75 MG Tab    metoprolol SR (TOPROL XL) 25 MG TABLET SR 24 HR      6. Atrial fibrillation with RVR ([facility])  EKG    amiodarone (CORDARONE) 200 MG Tab    Cardiac Event Monitor    CANCELED: EKG          Medical Decision Making: Today's Assessment/Status/Plan:          Severe multivessel artery disease , ACS   s/p CABG x 4 by Dr. Chapman (TRUONG TO LAD, RSVG TO DIAG 2, OM2, AND RPLB) with Left greater saphenous vein procurement (3/11/2024).     - aspirin and plavix for 6-12mo given his unstable angina presentation. No bleeding complications.   - HI statin.  atorvastatin 40 mg. No myalgias reported.   - change metoprolol tartrate 25 mg twice daily to metoprolol succinate 25 mg daily in the evenings  -continue lisinopril 10 mg daily  - EF preserved, 70%  -Continue using the incentive spirometer  -Continue using MAREN hose stockings to improve lower extremity swelling  - recommend ICR however unsure if doable since he lives in [place]       Postoperative atrial fibrillation  - lasting about 1 day in the hospital. No  symptomatic recurrence.  - ECG today is reviewed by me personally, demonstrates normal sinus rhythm.       - complete a full order of amiodarone. He has about one week left, then can stop amiodarone. Will screen for recurrent atrial arrhythmias once the amiodarone has washed out of system.   - Mail zio patch in about 7 weeks (6 weeks after the last dose of amiodarone)      Hypertension   Well controlled in clinic today. He reports some of the BP's in 130's -140's at home, mostly in the morning. Unsure if he takes his BP 2 hours after the dose of Lisinopril.   - Lisinopril 10 mg was resumed recently on 3/20/2024, continue. May need to increase the dose to 20 mg if consistently over 130/80.    -start metoprolol succinate 25 mg daily    Hyperlipidemia   - HI statin.  Continue atorvastatin 40 mg. No myalgias reported.    Goal LDL <70    Type 2 diabetes mellitus   A1c 6.1   Managed by primary care.  Metoprolol 500 mg daily    Follow-up in 3 months or sooner if clinical changes.     Encouraged to reach out via MyChart or telephone with any questions or concerns.     Thank you for allowing me to participate in the care of Mr. Christine, Julian       Belen Hedrick, MSN, APRN  [facility]  [address] [address]  Phone: [phone]  Fax: [phone]    Please note this dictation was created using voice recognition software.  I have made every reasonable attempt to correct obvious errors, but there may be errors of grammar and possibly content that I did not discover before finalizing the note.

## 2024-04-04 ENCOUNTER — OFFICE VISIT (OUTPATIENT)
Dept: CARDIOLOGY | Facility: MEDICAL CENTER | Age: 64
End: 2024-04-04
Payer: COMMERCIAL

## 2024-04-04 VITALS
OXYGEN SATURATION: 96 % | BODY MASS INDEX: 23.91 KG/M2 | WEIGHT: 167 LBS | SYSTOLIC BLOOD PRESSURE: 126 MMHG | RESPIRATION RATE: 16 BRPM | HEIGHT: 70 IN | DIASTOLIC BLOOD PRESSURE: 68 MMHG | HEART RATE: 76 BPM

## 2024-04-04 DIAGNOSIS — Z95.1 S/P CABG X 4: ICD-10-CM

## 2024-04-04 DIAGNOSIS — E11.40 TYPE 2 DIABETES MELLITUS WITH DIABETIC NEUROPATHY, WITHOUT LONG-TERM CURRENT USE OF INSULIN (HCC): ICD-10-CM

## 2024-04-04 DIAGNOSIS — I10 PRIMARY HYPERTENSION: Chronic | ICD-10-CM

## 2024-04-04 DIAGNOSIS — E78.5 HYPERLIPIDEMIA, UNSPECIFIED HYPERLIPIDEMIA TYPE: Chronic | ICD-10-CM

## 2024-04-04 DIAGNOSIS — I24.9 ACS (ACUTE CORONARY SYNDROME) (HCC): ICD-10-CM

## 2024-04-04 DIAGNOSIS — I48.91 ATRIAL FIBRILLATION WITH RVR (HCC): ICD-10-CM

## 2024-04-04 PROCEDURE — 99213 OFFICE O/P EST LOW 20 MIN: CPT

## 2024-04-04 PROCEDURE — 3078F DIAST BP <80 MM HG: CPT

## 2024-04-04 PROCEDURE — 99024 POSTOP FOLLOW-UP VISIT: CPT

## 2024-04-04 PROCEDURE — 3074F SYST BP LT 130 MM HG: CPT

## 2024-04-04 RX ORDER — AMIODARONE HYDROCHLORIDE 200 MG/1
200 TABLET ORAL DAILY
COMMUNITY

## 2024-04-04 RX ORDER — METOPROLOL SUCCINATE 25 MG/1
25 TABLET, EXTENDED RELEASE ORAL DAILY
Qty: 90 TABLET | Refills: 3 | Status: SHIPPED | OUTPATIENT
Start: 2024-04-04

## 2024-04-04 RX ORDER — CLOPIDOGREL BISULFATE 75 MG/1
75 TABLET ORAL DAILY
Qty: 90 TABLET | Refills: 3 | Status: SHIPPED | OUTPATIENT
Start: 2024-04-04 | End: 2025-03-30

## 2024-04-04 RX ORDER — LISINOPRIL 10 MG/1
10 TABLET ORAL DAILY
COMMUNITY

## 2024-04-04 RX ORDER — ATORVASTATIN CALCIUM 40 MG/1
40 TABLET, FILM COATED ORAL
Qty: 90 TABLET | Refills: 3 | Status: SHIPPED | OUTPATIENT
Start: 2024-04-04

## 2024-04-04 ASSESSMENT — ENCOUNTER SYMPTOMS
CLAUDICATION: 0
BLOOD IN STOOL: 0
COUGH: 0
SPUTUM PRODUCTION: 0
ORTHOPNEA: 0
BRUISES/BLEEDS EASILY: 0
PALPITATIONS: 0
WHEEZING: 0
SHORTNESS OF BREATH: 0
FEVER: 0
MUSCULOSKELETAL NEGATIVE: 1
PSYCHIATRIC NEGATIVE: 1
DIZZINESS: 0
ABDOMINAL PAIN: 0
WEAKNESS: 0
CHILLS: 0
NAUSEA: 0
PND: 0
HEADACHES: 0
VOMITING: 0

## 2024-04-04 ASSESSMENT — FIBROSIS 4 INDEX: FIB4 SCORE: 1.37

## 2024-04-05 ENCOUNTER — TELEPHONE (OUTPATIENT)
Dept: CARDIOLOGY | Facility: MEDICAL CENTER | Age: 64
End: 2024-04-05
Payer: COMMERCIAL

## 2024-04-06 NOTE — TELEPHONE ENCOUNTER
----- Message from NEL Martinez sent at 4/4/2024  2:08 PM PDT -----  Regarding: Blood pressure  Will Alexe, this patient is a CABG patient that is taking a log of BP.   He takes Metoprolol at night and lisinopril in the morning.    I have a suspicion he takes BP first thing in the morning.   Would you please call the patent to educate to take BP 2 hours after he takes lisinopril in am.   See if it changes the BP readings?   Thank you!

## 2024-04-06 NOTE — TELEPHONE ENCOUNTER
"Called pt to review MV recommendations.  He states he takes his BP \"in the morning before breakfast and meds and the evening after dinner and meds, 30 minutes-1 hour afterwards.  Advised to keep a log this week and return this call with readings next week.  Julian verbalizes understanding and states no other concerns or questions at this time.  He is appreciative of information given.      "

## 2024-04-10 ENCOUNTER — PATIENT MESSAGE (OUTPATIENT)
Dept: CARDIOLOGY | Facility: MEDICAL CENTER | Age: 64
End: 2024-04-10
Payer: COMMERCIAL

## 2024-04-10 VITALS — DIASTOLIC BLOOD PRESSURE: 68 MMHG | SYSTOLIC BLOOD PRESSURE: 118 MMHG

## 2024-04-11 NOTE — PATIENT COMMUNICATION
Noted BP log.    Average of AM readings imported to abstract encounter: 118/68    Replied to pt via [employer] regarding recommendations, see encounter.

## 2024-04-11 NOTE — PATIENT COMMUNICATION
Replied to pt via [employer] requesting more information regarding concerns, awaiting pt response.

## 2024-04-14 ENCOUNTER — PATIENT MESSAGE (OUTPATIENT)
Dept: CARDIOLOGY | Facility: MEDICAL CENTER | Age: 64
End: 2024-04-14
Payer: COMMERCIAL

## 2024-04-15 ENCOUNTER — PATIENT MESSAGE (OUTPATIENT)
Dept: CARDIOLOGY | Facility: MEDICAL CENTER | Age: 64
End: 2024-04-15

## 2024-04-15 ENCOUNTER — OFFICE VISIT (OUTPATIENT)
Dept: CARDIOTHORACIC SURGERY | Facility: MEDICAL CENTER | Age: 64
End: 2024-04-15
Payer: COMMERCIAL

## 2024-04-15 VITALS
OXYGEN SATURATION: 96 % | HEART RATE: 70 BPM | SYSTOLIC BLOOD PRESSURE: 118 MMHG | BODY MASS INDEX: 23.91 KG/M2 | TEMPERATURE: 97.9 F | DIASTOLIC BLOOD PRESSURE: 62 MMHG | WEIGHT: 167 LBS | HEIGHT: 70 IN

## 2024-04-15 DIAGNOSIS — Z98.890 POST-OPERATIVE STATE: ICD-10-CM

## 2024-04-15 PROCEDURE — 3074F SYST BP LT 130 MM HG: CPT | Performed by: NURSE PRACTITIONER

## 2024-04-15 PROCEDURE — 3078F DIAST BP <80 MM HG: CPT | Performed by: NURSE PRACTITIONER

## 2024-04-15 PROCEDURE — 99024 POSTOP FOLLOW-UP VISIT: CPT | Performed by: NURSE PRACTITIONER

## 2024-04-15 ASSESSMENT — FIBROSIS 4 INDEX: FIB4 SCORE: 1.37

## 2024-05-20 ENCOUNTER — NON-PROVIDER VISIT (OUTPATIENT)
Dept: CARDIOLOGY | Facility: MEDICAL CENTER | Age: 64
End: 2024-05-20
Payer: COMMERCIAL

## 2024-05-20 DIAGNOSIS — Z95.1 S/P CABG X 4: ICD-10-CM

## 2024-05-20 DIAGNOSIS — I48.91 ATRIAL FIBRILLATION WITH RVR (HCC): ICD-10-CM

## 2024-05-20 NOTE — PROGRESS NOTES
Home enrollment completed in the 14 day Zio XT Holter monitor per MING Ramirez. Monitor will be shipped to patient via [employer], pending EOS.

## 2024-05-22 ENCOUNTER — PATIENT MESSAGE (OUTPATIENT)
Dept: CARDIOLOGY | Facility: MEDICAL CENTER | Age: 64
End: 2024-05-22
Payer: COMMERCIAL

## 2024-05-23 NOTE — PATIENT COMMUNICATION
Noted pt response.    Replied to pt via [employer] regarding recommendations, see encounter.

## 2024-06-12 ENCOUNTER — TELEPHONE (OUTPATIENT)
Dept: CARDIOLOGY | Facility: MEDICAL CENTER | Age: 64
End: 2024-06-12
Payer: COMMERCIAL

## 2024-06-12 NOTE — TELEPHONE ENCOUNTER
I will wait for the final read from our cardiologists.  However no changes to his current plan of care.  We can discuss in more detail when I see him next month.

## 2024-06-12 NOTE — TELEPHONE ENCOUNTER
EOS noted in media dated today.     BE(ADD): Please see EOS in media dated today and read for final results as ADD. Thank you!      DB: Last saw MV (Hospital only now). Are you willing to advise on any recommendations? Patient has FV with you 7/16/24. Thank you.

## 2024-06-12 NOTE — TELEPHONE ENCOUNTER
[facility]ryn EOS to SANTOSH Nesbitt as ADD nurse on 6/12/2024  Preliminary findings:  4 episodes of SVT with rates between  and an avg rate of 136 bpm  Primary rhythm was Sinus rhythm with rates between  and an avg rate of 87 bpm  Patient recorded 5 events during monitoring which correspond to:  SR

## 2024-07-16 ENCOUNTER — OFFICE VISIT (OUTPATIENT)
Dept: CARDIOLOGY | Facility: MEDICAL CENTER | Age: 64
End: 2024-07-16
Attending: NURSE PRACTITIONER
Payer: COMMERCIAL

## 2024-07-16 VITALS
HEART RATE: 92 BPM | HEIGHT: 70 IN | RESPIRATION RATE: 16 BRPM | OXYGEN SATURATION: 98 % | WEIGHT: 162 LBS | BODY MASS INDEX: 23.19 KG/M2 | DIASTOLIC BLOOD PRESSURE: 58 MMHG | SYSTOLIC BLOOD PRESSURE: 100 MMHG

## 2024-07-16 DIAGNOSIS — I25.10 CORONARY ARTERY DISEASE INVOLVING NATIVE CORONARY ARTERY OF NATIVE HEART WITHOUT ANGINA PECTORIS: ICD-10-CM

## 2024-07-16 DIAGNOSIS — E11.40 TYPE 2 DIABETES MELLITUS WITH DIABETIC NEUROPATHY, WITHOUT LONG-TERM CURRENT USE OF INSULIN (HCC): ICD-10-CM

## 2024-07-16 DIAGNOSIS — Z90.81 H/O SPLENECTOMY: Chronic | ICD-10-CM

## 2024-07-16 DIAGNOSIS — E78.5 HYPERLIPIDEMIA, UNSPECIFIED HYPERLIPIDEMIA TYPE: Chronic | ICD-10-CM

## 2024-07-16 DIAGNOSIS — I48.91 ATRIAL FIBRILLATION WITH RVR (HCC): ICD-10-CM

## 2024-07-16 DIAGNOSIS — I10 PRIMARY HYPERTENSION: Chronic | ICD-10-CM

## 2024-07-16 DIAGNOSIS — Z95.1 S/P CABG X 4: ICD-10-CM

## 2024-07-16 PROCEDURE — 99214 OFFICE O/P EST MOD 30 MIN: CPT | Performed by: NURSE PRACTITIONER

## 2024-07-16 PROCEDURE — 3074F SYST BP LT 130 MM HG: CPT | Performed by: NURSE PRACTITIONER

## 2024-07-16 PROCEDURE — 99213 OFFICE O/P EST LOW 20 MIN: CPT | Performed by: NURSE PRACTITIONER

## 2024-07-16 PROCEDURE — 3078F DIAST BP <80 MM HG: CPT | Performed by: NURSE PRACTITIONER

## 2024-07-16 ASSESSMENT — ENCOUNTER SYMPTOMS
LOSS OF CONSCIOUSNESS: 0
DIZZINESS: 0
ORTHOPNEA: 0
SHORTNESS OF BREATH: 0
PALPITATIONS: 0

## 2024-07-16 ASSESSMENT — FIBROSIS 4 INDEX: FIB4 SCORE: 1.37

## 2025-03-21 DIAGNOSIS — I25.10 CORONARY ARTERY DISEASE INVOLVING NATIVE CORONARY ARTERY OF NATIVE HEART WITHOUT ANGINA PECTORIS: ICD-10-CM

## 2025-08-18 DIAGNOSIS — I25.10 CORONARY ARTERY DISEASE INVOLVING NATIVE CORONARY ARTERY OF NATIVE HEART WITHOUT ANGINA PECTORIS: ICD-10-CM

## 2025-08-28 ENCOUNTER — PATIENT MESSAGE (OUTPATIENT)
Dept: CARDIOLOGY | Facility: MEDICAL CENTER | Age: 65
End: 2025-08-28
Payer: COMMERCIAL

## (undated) DEVICE — SET LEADWIRE 5 LEAD BEDSIDE DISPOSABLE ECG (1SET OF 5/EA)

## (undated) DEVICE — SOD. CHL. INJ. 0.9% 1000 ML - (14EA/CA 60CA/PF)

## (undated) DEVICE — GLOVE BIOGEL SZ 8 SURGICAL PF LTX - (50PR/BX 4BX/CA)

## (undated) DEVICE — TUBING INSUFFLATION - (10/BX)

## (undated) DEVICE — BLADE SURGICAL #15 - (50/BX 3BX/CA)

## (undated) DEVICE — LEAD PACING TEMP MYO - (12/BX)

## (undated) DEVICE — SENSOR CEREBRAL AND SOMATIC MONITORING (20/CA)

## (undated) DEVICE — LACTATED RINGERS INJ 1000 ML - (14EA/CA 60CA/PF)

## (undated) DEVICE — BLADE SURGICAL #11 - (50/BX)

## (undated) DEVICE — PUNCH 4MM SHRP CNCL TIP DL - (6/BX)

## (undated) DEVICE — GLOVE BIOGEL PI INDICATOR SZ 8.0 SURGICAL PF LF -(50/BX 4BX/CA)

## (undated) DEVICE — SUTURE 5 SURGICAL STEEL V-40 - (12/BX) CCS CURRENT

## (undated) DEVICE — GLOVE BIOGEL SZ 6 PF LATEX - (50EA/BX 4BX/CA)

## (undated) DEVICE — FIBRILLAR SURGICEL 4X4 - 10/CA

## (undated) DEVICE — GLOVE BIOGEL SZ 8.5 SURGICAL PF LTX - (50PR/BX 4BX/CA)

## (undated) DEVICE — CORETEMP DRAPE FORM-FITTED EASY DROPANDGO DRAPE FOR USE ON THE CORETEMP FLUID MANAGEMENT 56IN X 56IN

## (undated) DEVICE — CANISTER SUCTION 3000ML MECHANICAL FILTER AUTO SHUTOFF MEDI-VAC NONSTERILE LF DISP  (40EA/CA)

## (undated) DEVICE — KIT RADIAL ARTERY 20GA W/MAX BARRIER AND BIOPATCH  (5EA/CA) #10740 IS FOR THE SET RADIAL ARTERIAL

## (undated) DEVICE — DECANTER FLD BLS - (50/CA)

## (undated) DEVICE — ELECTRODE DUAL RETURN W/ CORD - (50/PK)

## (undated) DEVICE — GLOVE BIOGEL SZ 7 SURGICAL PF LTX - (50PR/BX 4BX/CA)

## (undated) DEVICE — STOPCOCK MALE 4-WAY - (50/CA)

## (undated) DEVICE — SYSTEM CHEST DRAIN ADULT/PEDS W/AUTO TRANSFUSION CAPABILITY SAHARA (6EA/CA)

## (undated) DEVICE — TRAY SURESTEP FOLEY TEMP SENSING 16FR (10EA/CA) ORDER  #18764 FOR TEMP FOLEY ONLY

## (undated) DEVICE — SUTURE 4-0 PROLENE RB-1 D/A 36 (36PK/BX)"

## (undated) DEVICE — SUTURE 2-0 VICRYL PLUS CT-1 36 (36PK/BX)"

## (undated) DEVICE — TUBE CHEST 32FR. STRAIGHT - (10EA/CA)

## (undated) DEVICE — INSERT STEALTH #5 - (10/BX)

## (undated) DEVICE — SUTURE 7-0 PROLENE 24BV175-6 - EP8735H (36/BX)"

## (undated) DEVICE — BANDAGE ELASTIC 6 HONEYCOMB - 6X5YD LF (20/CA)"

## (undated) DEVICE — SUTURE 3-0 PROLENE SH 36 (36PK/BX)"

## (undated) DEVICE — BLADE BEAVER 6900 MINI SHARP ALL AROUND (20/CA)

## (undated) DEVICE — SET FLUID WARMING STANDARD FLOW - (10/CA)

## (undated) DEVICE — CHLORAPREP 26 ML APPLICATOR - ORANGE TINT(25/CA)

## (undated) DEVICE — SYRINGE NON SAFETY 3 CC 21 GA X 1 1/2 IN (100/BX 8BX/CA)

## (undated) DEVICE — PACK CV DRAPING/BASIN 2PART - (1/CA)

## (undated) DEVICE — GLOVE SZ 8 BIOGEL PI MICRO - PF LF (50PR/BX)

## (undated) DEVICE — SENSOR OXIMETER ADULT SPO2 RD SET (20EA/BX)

## (undated) DEVICE — SODIUM CHL IRRIGATION 0.9% 1000ML (12EA/CA)

## (undated) DEVICE — BLOWER/MISTER (5EA/PK)

## (undated) DEVICE — SUTURE 4-0 30CM STRATAFIX SPIRAL PS-2 (12EA/BX)

## (undated) DEVICE — GOWN SURGEONS X-LARGE - DISP. (30/CA)

## (undated) DEVICE — TUBING CLEARLINK DUO-VENT - C-FLO (48EA/CA)

## (undated) DEVICE — COVER LIGHT HANDLE ALC PLUS DISP (18EA/BX)

## (undated) DEVICE — KIT ENDOHARVEST SYSTEM 8 - MUST ORDER 5 AT A TIME

## (undated) DEVICE — CLIP SM INTNL HRZN TI ESCP LGT - (24EA/PK 25PK/BX)

## (undated) DEVICE — MICRODRIP PRIMARY VENTED 60 (48EA/CA) THIS WAS PART #2C8428 WHICH WAS DISCONTINUED

## (undated) DEVICE — SODIUM CHL. INJ. 0.9% 500ML (24EA/CA 50CA/PF)

## (undated) DEVICE — GLOVE SIZE 6.5  SURGEON ACCELERATOR FREE GREEN (50PR/BX)

## (undated) DEVICE — GLOVE BIOGEL INDICATOR SZ 8 SURGICAL PF LTX - (50/BX 4BX/CA)

## (undated) DEVICE — GLOVE BIOGEL INDICATOR SZ 9 SURGICAL PF LTX - (160/CA)

## (undated) DEVICE — TRANSDUCER BIFURCATED MONITORING KIT (10EA/CA)

## (undated) DEVICE — TOWELS CLOTH SURGICAL - (4/PK 20PK/CA)

## (undated) DEVICE — SUCTION INSTRUMENT YANKAUER BULBOUS TIP W/O VENT (50EA/CA)

## (undated) DEVICE — GOWN SURGICAL XX-LARGE - (28EA/CA) SIRUS NON REINFORCED

## (undated) DEVICE — SPRING BULLDOG 1/2 FORCE BLUE - (10/BX)

## (undated) DEVICE — KIT TOURNIQUET DLP (40EA/PK)

## (undated) DEVICE — BLADE STERNUM SAW SURGICAL 32.0 X 6.4 MM STERILE (1/EA)

## (undated) DEVICE — TRAY MULTI-LUMEN 7FR PRESSURE W/MAX BARRIER AND BIOPATCH - (5/CA)

## (undated) DEVICE — DERMABOND ADVANCED - (12EA/BX)

## (undated) DEVICE — SET TUBING PNEUMOCLEAR HIGH FLOW SMOKE EVACUATION (10EA/BX)

## (undated) DEVICE — SUTURE OHS

## (undated) DEVICE — TUBE CHEST 32FR. RIGHT ANGLED (10EA/CA)

## (undated) DEVICE — TUBE E-T HI-LO CUFF 8.0MM (10EA/PK)

## (undated) DEVICE — SET EXTENSION WITH 2 PORTS (48EA/CA) ***PART #2C8610 IS A SUBSTITUTE*****

## (undated) DEVICE — BAG SPONGE COUNT 10.25 X 32 - BLUE (250/CA)

## (undated) DEVICE — SUTURE 6-0 PROLENE RB-2 D/A 30 (36PK/BX)"

## (undated) DEVICE — SET BIFURCATED BLOOD - (48EA/CS)

## (undated) DEVICE — BAG RESUSCITATION DISPOSABLE - WITH MASK (10 EA/CA)

## (undated) DEVICE — STAPLER SKIN DISP - (6/BX 10BX/CA) VISISTAT

## (undated) DEVICE — PACK VEIN - (19/CA)